# Patient Record
Sex: FEMALE | Race: WHITE | NOT HISPANIC OR LATINO | Employment: OTHER | URBAN - NONMETROPOLITAN AREA
[De-identification: names, ages, dates, MRNs, and addresses within clinical notes are randomized per-mention and may not be internally consistent; named-entity substitution may affect disease eponyms.]

---

## 2017-01-25 DIAGNOSIS — E78.5 HYPERLIPIDEMIA LDL GOAL <130: Primary | ICD-10-CM

## 2017-01-25 NOTE — TELEPHONE ENCOUNTER
Crestor     Last Written Prescription Date: 08/16/2016  Last Fill Quantity: 90, # refills: 1  [place] [facility] [facility] or [facility] prescribing provider: 08/23/2016       No results found for this basename: chol  No results found for this basename: hdl  No results found for this basename: ldl  No results found for this basename: trig  No results found for this basename: choljanki Villalobos, [facility]

## 2017-01-25 NOTE — LETTER
[address]   [address]  Tel. [phone]/ Fax [phone]    October 13, 2017        Lani BARKSDALE Jhonny  [address] [place] [address]          Dear Ms. Lani Barry:    Your previous orders for fasting lab work have been extended.  Please call to schedule a lab appointment and return to the clinic to have the labs drawn at your earliest convenience.    If you are required to be fasting for these tests,  remember to have nothing by mouth (except water) after midnight on the night before your test.    If you have any questions or concerns, please contact our office.    Sincerely,       Cipriano Arias D.O. [facility]

## 2017-01-27 RX ORDER — ROSUVASTATIN CALCIUM 40 MG/1
40 TABLET, COATED ORAL DAILY
Qty: 90 TABLET | Refills: 1 | Status: SHIPPED | OUTPATIENT
Start: 2017-01-27 | End: 2017-06-15

## 2017-01-27 NOTE — TELEPHONE ENCOUNTER
Crestor  Routing refill request to provider for review/approval because:  Labs not current:  No fasting lipids on file--future labs ordered, routing to schedulers    Rommel Dallas RN, BSN

## 2017-04-04 DIAGNOSIS — G56.22 CUBITAL TUNNEL SYNDROME, LEFT: ICD-10-CM

## 2017-04-04 NOTE — TELEPHONE ENCOUNTER
meloxicam (MOBIC) 15 MG tablet      Last Written Prescription Date: 10/6/16  Last Quantity: 30, # refills: 2  Last Office Visit with G, P or [facility] prescribing provider: 8/23/16       Creatinine   Date Value Ref Range Status   08/16/2016 0.64 0.52 - 1.04 mg/dL Final     Lab Results   Component Value Date    AST 38 08/23/2016     Lab Results   Component Value Date    ALT 70 08/23/2016     BP Readings from Last 3 Encounters:   11/05/16 117/63   08/30/16 107/55   08/23/16 122/76

## 2017-04-05 RX ORDER — MELOXICAM 15 MG/1
TABLET ORAL
Qty: 30 TABLET | Refills: 4 | Status: SHIPPED | OUTPATIENT
Start: 2017-04-05 | End: 2017-10-25

## 2017-04-05 NOTE — TELEPHONE ENCOUNTER
Prescription approved per [facility] Refill Protocol.    Georgia Briones RN, BSN

## 2017-04-07 ENCOUNTER — TELEPHONE (OUTPATIENT)
Dept: FAMILY MEDICINE | Facility: OTHER | Age: 62
End: 2017-04-07

## 2017-04-07 NOTE — LETTER
[address]   [address]  Tel. [phone] / Fax [phone]    April 7, 2017        Lani Barry  [address] [place] [address]          Dear Lani,    Our records show that you are due for a physical, pap, mammogram, and colonoscopy. Please contact us at [phone] to schedule. It is important to stay current with healthcare visits and lab work as directed by your physician.     If you have a high deductible or currently do not have health insurance please consider contacting the [facility] program. For eligible women, [facility] provides free office visits for breast and cervical exams, as well as a screening mammogram and Pap smears. If one of your screening tests shows a problem, [facility] covers many diagnostic services and can often cover treatment, if needed. You can complete the office visit at your [facility]. You can contact [facility] at [phone].    If you have completed these tests at another facility, please have the records sent to our clinic so that we can best coordinate your care. If you have been seen or you plan to be seen by another provider for these concerns or if you are unable follow through on the recommendations, please contact the clinic.    Taking care of your health is important to us!       Sincerely,        Cipriano Arias D.O.

## 2017-04-07 NOTE — TELEPHONE ENCOUNTER
Panel Management Review      Patient has the following on her problem list: None      Composite cancer screening  Chart review shows that this patient is due/due soon for the following Pap Smear, Mammogram and Colonoscopy  Summary:    Patient is due/failing the following:   COLONOSCOPY, MAMMOGRAM and PAP    Action needed:   Patient needs office visit for mammo, pap, colonoscopy.    Type of outreach:    Sent letter.    Questions for provider review:    None                                                                                                                                    Tracy RAIN     Chart routed to Care Team .

## 2017-04-18 ENCOUNTER — OFFICE VISIT (OUTPATIENT)
Dept: FAMILY MEDICINE | Facility: OTHER | Age: 62
End: 2017-04-18
Payer: MEDICARE

## 2017-04-18 VITALS
SYSTOLIC BLOOD PRESSURE: 122 MMHG | DIASTOLIC BLOOD PRESSURE: 72 MMHG | TEMPERATURE: 98 F | HEART RATE: 74 BPM | OXYGEN SATURATION: 98 % | WEIGHT: 203 LBS | BODY MASS INDEX: 38.99 KG/M2 | RESPIRATION RATE: 16 BRPM

## 2017-04-18 DIAGNOSIS — L20.84 INTRINSIC ECZEMA: ICD-10-CM

## 2017-04-18 DIAGNOSIS — Z12.31 ENCOUNTER FOR SCREENING MAMMOGRAM FOR BREAST CANCER: ICD-10-CM

## 2017-04-18 DIAGNOSIS — E78.5 HYPERLIPIDEMIA LDL GOAL <130: Primary | ICD-10-CM

## 2017-04-18 DIAGNOSIS — Z12.11 SPECIAL SCREENING FOR MALIGNANT NEOPLASMS, COLON: ICD-10-CM

## 2017-04-18 PROCEDURE — 99213 OFFICE O/P EST LOW 20 MIN: CPT | Performed by: INTERNAL MEDICINE

## 2017-04-18 RX ORDER — FLUOCINOLONE ACETONIDE 0.1 MG/ML
SOLUTION TOPICAL 2 TIMES DAILY
Qty: 60 ML | Refills: 1 | Status: SHIPPED | OUTPATIENT
Start: 2017-04-18 | End: 2017-05-18

## 2017-04-18 ASSESSMENT — PAIN SCALES - GENERAL: PAINLEVEL: NO PAIN (0)

## 2017-04-18 NOTE — LETTER

## 2017-04-18 NOTE — NURSING NOTE
"Chief Complaint   Patient presents with     Lipids     recheck       Initial /72  Pulse 74  Temp 98  F (36.7  C) (Tympanic)  Resp 16  Wt 203 lb (92.1 kg)  SpO2 98%  BMI 38.99 kg/m2 Estimated body mass index is 38.99 kg/(m^2) as calculated from the following:    Height as of 9/14/16: 5' 0.5\" (1.537 m).    Weight as of this encounter: 203 lb (92.1 kg).  Medication Reconciliation: complete    "

## 2017-04-18 NOTE — MR AVS SNAPSHOT
After Visit Summary   4/18/2017    Lani Barry    MRN: [id number]           Patient Information     Date Of Birth          1955        Visit Information        Provider Department      4/18/2017 9:40 AM Cipriano Arias DO [facility]        Today's Diagnoses     Hyperlipidemia LDL goal <130    -  1    Intrinsic eczema        Encounter for screening mammogram for breast cancer        Special screening for malignant neoplasms, colon           Follow-ups after your visit        Additional Services     GASTROENTEROLOGY ADULT REF PROCEDURE ONLY       Last Lab Result: Creatinine (mg/dL)       Date                     Value                 08/16/2016               0.64             ----------  Body mass index is 38.99 kg/(m^2).     Needed:  No  Language:  English    Patient will be contacted to schedule procedure.     Please be aware that coverage of these services is subject to the terms and limitations of your health insurance plan.  Call member services at your health plan with any benefit or coverage questions.  Any procedures must be performed at a [facility] facility OR coordinated by your clinic's referral office.    Please bring the following with you to your appointment:    (1) Any X-Rays, CTs or MRIs which have been performed.  Contact the facility where they were done to arrange for  prior to your scheduled appointment.    (2) List of current medications   (3) This referral request   (4) Any documents/labs given to you for this referral                  Your next 10 appointments already scheduled     Apr 25, 2017  9:15 AM CDT   MA SCREENING DIGITAL BILATERAL with [facility]MA1   [facility] ([facility]    [address] [facility] [id number] [phone]           Do not use any powder, lotion or deodorant under your arms or on your breast. If you do, we will ask you to remove it before your exam.  Wear comfortable, two-piece  "clothing.  If you have any allergies, tell your care team.  Bring any previous mammograms from other facilities or have them mailed to the breast center.              Future tests that were ordered for you today     Open Future Orders        Priority Expected Expires Ordered    *MA Screening Digital Bilateral Routine  2018            Who to contact     If you have questions or need follow up information about today's clinic visit or your schedule please contact [facility] directly at [phone].  Normal or non-critical lab and imaging results will be communicated to you by Mentegramhart, letter or phone within 4 business days after the clinic has received the results. If you do not hear from us within 7 days, please contact the clinic through amcuret or phone. If you have a critical or abnormal lab result, we will notify you by phone as soon as possible.  Submit refill requests through Nutshell or call your pharmacy and they will forward the refill request to us. Please allow 3 business days for your refill to be completed.          Additional Information About Your Visit        Nutshell Information     Nutshell lets you send messages to your doctor, view your test results, renew your prescriptions, schedule appointments and more. To sign up, go to [email or web address] . Click on \"Log in\" on the left side of the screen, which will take you to the Welcome page. Then click on \"Sign up Now\" on the right side of the page.     You will be asked to enter the access code listed below, as well as some personal information. Please follow the directions to create your username and password.     Your access code is: [email or web address]  Expires: 2017 10:24 AM     Your access code will  in 90 days. If you need help or a new code, please call your [facility] or [phone].        Care EveryWhere ID     This is your Care EveryWhere ID. This could be used by other organizations to access your " [facility] medical records  [id number]        Your Vitals Were     Pulse Temperature Respirations Pulse Oximetry BMI (Body Mass Index)       74 98  F (36.7  C) (Tympanic) 16 98% 38.99 kg/m2        Blood Pressure from Last 3 Encounters:   04/18/17 122/72   11/05/16 117/63   08/30/16 107/55    Weight from Last 3 Encounters:   04/18/17 203 lb (92.1 kg)   09/14/16 195 lb (88.5 kg)   08/30/16 195 lb (88.5 kg)              We Performed the Following     GASTROENTEROLOGY ADULT REF PROCEDURE ONLY          Today's Medication Changes          These changes are accurate as of: 4/18/17 10:26 AM.  If you have any questions, ask your nurse or doctor.               Start taking these medicines.        Dose/Directions    fluocinolone 0.01 % solution   Commonly known as:  SYNALAR   Used for:  Intrinsic eczema   Started by:  Cipriano Arias DO        Apply topically 2 times daily   Quantity:  60 mL   Refills:  1         These medicines have changed or have updated prescriptions.        Dose/Directions    nexIUM 40 MG CR capsule   This may have changed:  Another medication with the same name was removed. Continue taking this medication, and follow the directions you see here.   Used for:  Gastroesophageal reflux disease without esophagitis   Generic drug:  esomeprazole   Changed by:  Cipriano Arias DO        Dose:  40 mg   Take 1 capsule (40 mg) by mouth every morning (before breakfast) Take 30-60 minutes before eating.   Quantity:  90 capsule   Refills:  3            Where to get your medicines      These medications were sent to [facility] [address] [address]     Phone:  [phone]     fluocinolone 0.01 % solution                Primary Care Provider Office Phone # Fax #    Cipriano Arias [phone]       [address] [place] [address]        Thank you!     Thank you  for choosing [facility]  for your care. Our goal is always to provide you with excellent care. Hearing back from our patients is one way we can continue to improve our services. Please take a few minutes to complete the written survey that you may receive in the mail after your visit with us. Thank you!             Your Updated Medication List - Protect others around you: Learn how to safely use, store and throw away your medicines at www.disposemymeds.org.          This list is accurate as of: 4/18/17 10:26 AM.  Always use your most recent med list.                   Brand Name Dispense Instructions for use    aspirin 81 MG chewable tablet     108 tablet    Take 1 tablet (81 mg) by mouth daily       B-6 50 MG Tabs          fluocinolone 0.01 % solution    SYNALAR    60 mL    Apply topically 2 times daily       meloxicam 15 MG tablet    MOBIC    30 tablet    TAKE 1 TABLET DAILY       nexIUM 40 MG CR capsule   Generic drug:  esomeprazole     90 capsule    Take 1 capsule (40 mg) by mouth every morning (before breakfast) Take 30-60 minutes before eating.       rosuvastatin 40 MG tablet    CRESTOR    90 tablet    Take 1 tablet (40 mg) by mouth daily

## 2017-04-18 NOTE — PROGRESS NOTES
SUBJECTIVE:                                                    Lani Barry is a 62 year old female who presents to clinic today for the following health issues:      Hyperlipidemia Follow-Up      Rate your low fat/cholesterol diet?: good    Taking statin?  Crestor    Other lipid medications/supplements?:  none       Amount of exercise or physical activity: None    Problems taking medications regularly: No    Medication side effects: none    Diet: regular (no restrictions)    CHIEF COMPLAINT:    The patient presents today for follow-up of her hyperlipidemia and Crestor. Unfortunately, she's not actually taking the Crestor. She states that she may remember one pill a week but usually less. Taking it in the evening is not working for her. She's had chest pain or other symptoms of acute cardiovascular involvement. She had no muscle pain when she did take medication but never really took it regularly enough to determine that anyway. She'll take her Nexium for reflux that seems to be working quite well. She does complain of a rash inside her ears. Is in both ears but worse on the left. She has very typical eczema. She also has a small patch on the back of her scalp at the hairline. It is red, slightly crusty and flaky. No signs of secondary infection are noted. We've also discussed the need for screening colonoscopy and mammography. She is post total hysterectomy therefore does not require Pap smear.                       PAST, FAMILY,SOCIAL HISTORY:     Medical  History:   has no past medical history on file.     Surgical History:   has a past surgical history that includes Release ulnar nerve (elbow) (Left, 8/30/2016).     Social History:   reports that she has never smoked. She has never used smokeless tobacco. She reports that she does not drink alcohol or use illicit drugs.     Family History:  family history includes CANCER in her sister; CEREBROVASCULAR DISEASE in her sister; DIABETES in her paternal  uncle.            MEDICATIONS  Current Outpatient Prescriptions   Medication Sig Dispense Refill     fluocinolone (SYNALAR) 0.01 % solution Apply topically 2 times daily 60 mL 1     meloxicam (MOBIC) 15 MG tablet TAKE 1 TABLET DAILY 30 tablet 4     rosuvastatin (CRESTOR) 40 MG tablet Take 1 tablet (40 mg) by mouth daily 90 tablet 1     NEXIUM 40 MG capsule Take 1 capsule (40 mg) by mouth every morning (before breakfast) Take 30-60 minutes before eating. 90 capsule 3     Pyridoxine HCl (B-6) 50 MG TABS        aspirin 81 MG chewable tablet Take 1 tablet (81 mg) by mouth daily 108 tablet 3         --------------------------------------------------------------------------------------------------------------------                          REVIEW OF SYSTEMS:         LUNGS: Pt denies: cough,excess sputum, hemoptysis, or shortness of breath.   HEART: Pt denies: chest pain, arrythmia, syncope, tachy or bradyarrhythmia or excess edema.   GI: Pt denies: nausea, vomitting, diarrhea, constipation, melena, or hematochezia.   NEURO: Pt denies: seizures, strokes, diplopia, weakness, paraesthesias, or paralysis.   SKIN: Pt denies: discoloration, or additional lesions of concern. Denies recent hair loss. Does have eczema in her external auditory ears/pinna and posterior scalp.                          EXAMINATION:         /72  Pulse 74  Temp 98  F (36.7  C) (Tympanic)  Resp 16  Wt 203 lb (92.1 kg)  SpO2 98%  BMI 38.99 kg/m2   Constitutional: The patient appears to be in no acute distress. The patient appears to be adequately hydrated. No acute respiratory or hemodynamic distress is noted at this time.   LUNGS: clear bilaterally, airflow is brisk, no intercostal retraction or stridor is noted. No coughing is noted during visit.   HEART:  regular without rubs, clicks, gallops, or murmurs. PMI is nondisplaced. Upstrokes are brisk. S1,S2 are heard.   GI: Abdomen is soft, without rebound, guarding or tenderness. Bowel sounds  are appropriate. No renal bruits are heard.    NEURO: Pt is alert and appropriate. No neurologic lateralization is noted. Cranial nerves 2-12 are intact. Peripheral sensory and motor function are grossly normal   SKIN:  warm and dry. The eczema is as described above. No other lesions of concern are noted.                          DECISION MAKIN. Hyperlipidemia LDL goal <130  Recommend that she takes the statin medication and we rechecked the blood work in about a month. There is no real reason to check it now as the elevated cholesterol would only be followed by her recommendation to take the medication.    2. Intrinsic eczema  Trial of topical steroid. Instructed not to use this anywhere except her current areas of eczema.  - fluocinolone (SYNALAR) 0.01 % solution; Apply topically 2 times daily  Dispense: 60 mL; Refill: 1    3. Encounter for screening mammogram for breast cancer  Discussed and scheduled  - *MA Screening Digital Bilateral; Future    4. Special screening for malignant neoplasms, colon  Discussed and scheduled  - GASTROENTEROLOGY ADULT REF PROCEDURE ONLY                                 FOLLOW UP    I have asked the patient to make an appointment for follow up with me in 1 month fasting          I have carefully explained the diagnosis and treatment options with the patient. The patient has displayed an understanding of the above, and all subsequent questions were answered.         DO ADOLFO Barth    Portions of this note were produced using Strands  Although every attempt at real-time proof reading has been made, occasional grammar/syntax errors may have been missed.

## 2017-04-25 ENCOUNTER — RADIANT APPOINTMENT (OUTPATIENT)
Dept: MAMMOGRAPHY | Facility: OTHER | Age: 62
End: 2017-04-25
Attending: INTERNAL MEDICINE
Payer: MEDICARE

## 2017-04-25 DIAGNOSIS — Z12.31 ENCOUNTER FOR SCREENING MAMMOGRAM FOR BREAST CANCER: ICD-10-CM

## 2017-04-25 PROCEDURE — G0202 SCR MAMMO BI INCL CAD: HCPCS | Mod: TC

## 2017-05-10 ENCOUNTER — HOSPITAL ENCOUNTER (OUTPATIENT)
Facility: CLINIC | Age: 62
Discharge: HOME | End: 2017-05-10
Attending: INTERNAL MEDICINE | Admitting: INTERNAL MEDICINE
Payer: MEDICARE

## 2017-05-10 ENCOUNTER — SURGERY (OUTPATIENT)
Age: 62
End: 2017-05-10

## 2017-05-10 VITALS
TEMPERATURE: 98.3 F | SYSTOLIC BLOOD PRESSURE: 93 MMHG | OXYGEN SATURATION: 98 % | DIASTOLIC BLOOD PRESSURE: 49 MMHG | RESPIRATION RATE: 16 BRPM

## 2017-05-10 LAB — COLONOSCOPY: NORMAL

## 2017-05-10 PROCEDURE — 25000128 H RX IP 250 OP 636: Performed by: INTERNAL MEDICINE

## 2017-05-10 PROCEDURE — 45380 COLONOSCOPY AND BIOPSY: CPT | Mod: XU,PT

## 2017-05-10 PROCEDURE — 25000125 ZZHC RX 250: Performed by: INTERNAL MEDICINE

## 2017-05-10 PROCEDURE — 88305 TISSUE EXAM BY PATHOLOGIST: CPT | Mod: 26 | Performed by: INTERNAL MEDICINE

## 2017-05-10 PROCEDURE — 40000296 ZZH STATISTIC ENDO RECOVERY CLASS 1:2 FIRST HOUR: Performed by: INTERNAL MEDICINE

## 2017-05-10 PROCEDURE — 88305 TISSUE EXAM BY PATHOLOGIST: CPT | Performed by: INTERNAL MEDICINE

## 2017-05-10 PROCEDURE — 45385 COLONOSCOPY W/LESION REMOVAL: CPT | Mod: PT | Performed by: INTERNAL MEDICINE

## 2017-05-10 RX ORDER — FENTANYL CITRATE 50 UG/ML
INJECTION, SOLUTION INTRAMUSCULAR; INTRAVENOUS PRN
Status: DISCONTINUED | OUTPATIENT
Start: 2017-05-10 | End: 2017-05-10 | Stop reason: HOSPADM

## 2017-05-10 RX ORDER — ONDANSETRON 2 MG/ML
4 INJECTION INTRAMUSCULAR; INTRAVENOUS
Status: DISCONTINUED | OUTPATIENT
Start: 2017-05-10 | End: 2017-05-10 | Stop reason: HOSPADM

## 2017-05-10 RX ORDER — LIDOCAINE 40 MG/G
CREAM TOPICAL
Status: DISCONTINUED | OUTPATIENT
Start: 2017-05-10 | End: 2017-05-10 | Stop reason: HOSPADM

## 2017-05-10 RX ADMIN — MIDAZOLAM HYDROCHLORIDE 1 MG: 1 INJECTION, SOLUTION INTRAMUSCULAR; INTRAVENOUS at 13:43

## 2017-05-10 RX ADMIN — MIDAZOLAM HYDROCHLORIDE 2 MG: 1 INJECTION, SOLUTION INTRAMUSCULAR; INTRAVENOUS at 13:37

## 2017-05-10 RX ADMIN — FENTANYL CITRATE 50 MCG: 50 INJECTION, SOLUTION INTRAMUSCULAR; INTRAVENOUS at 13:44

## 2017-05-10 RX ADMIN — MIDAZOLAM HYDROCHLORIDE 2 MG: 1 INJECTION, SOLUTION INTRAMUSCULAR; INTRAVENOUS at 13:48

## 2017-05-10 RX ADMIN — MIDAZOLAM HYDROCHLORIDE 1 MG: 1 INJECTION, SOLUTION INTRAMUSCULAR; INTRAVENOUS at 13:40

## 2017-05-10 RX ADMIN — FENTANYL CITRATE 50 MCG: 50 INJECTION, SOLUTION INTRAMUSCULAR; INTRAVENOUS at 13:37

## 2017-05-10 RX ADMIN — MIDAZOLAM HYDROCHLORIDE 1 MG: 1 INJECTION, SOLUTION INTRAMUSCULAR; INTRAVENOUS at 13:39

## 2017-05-10 NOTE — IP AVS SNAPSHOT
[facility]    [address] [facility] [address]    Phone:  [phone]                                       After Visit Summary   5/10/2017    Lani Barry    MRN: [id number]           After Visit Summary Signature Page     I have received my discharge instructions, and my questions have been answered. I have discussed any challenges I see with this plan with the nurse or doctor.    ..........................................................................................................................................  Patient/Patient Representative Signature      ..........................................................................................................................................  Patient Representative Print Name and Relationship to Patient    ..................................................               ................................................  Date                                            Time    ..........................................................................................................................................  Reviewed by Signature/Title    ...................................................              ..............................................  Date                                                            Time

## 2017-05-10 NOTE — IP AVS SNAPSHOT
"                  MRN:[id number]                      After Visit Summary   5/10/2017    Lani aBrry    MRN: [id number]           Thank you!     Thank you for choosing [facility] for your care. Our goal is always to provide you with excellent care. Hearing back from our patients is one way we can continue to improve our services. Please take a few minutes to complete the written survey that you may receive in the mail after you visit with us. Thank you!        Patient Information     Date Of Birth          1955        About your hospital stay     You were admitted on:  May 10, 2017 You last received care in the:  [facility]    You were discharged on:  May 10, 2017       Who to Call     For medical emergencies, please call 911.  For non-urgent questions about your medical care, please call your primary care provider or clinic, [phone]  For questions related to your surgery, please call your surgery clinic        Attending Provider     Provider Specialty    Luis Dumont MD Gastroenterology       Primary Care Provider Office Phone # Fax #    Rfdizdtr Miguel Arias [phone]       [address]   [facility] [address]        Pending Results     Date and Time Order Name Status Description    5/10/2017 1351 Surgical pathology exam In process             Admission Information     Date & Time Provider Department Dept. Phone    5/10/2017 Luis Dumont MD [facility] [phone]      Your Vitals Were     Blood Pressure Temperature Respirations Pulse Oximetry          103/52 98.3  F (36.8  C) (Oral) 16 99%        MyChart Information     Nuritast lets you send messages to your doctor, view your test results, renew your prescriptions, schedule appointments and more. To sign up, go to [email or web address] . Click on \"Log in\" on the left side of the screen, which will take you to the Welcome page. Then click on \"Sign up Now\" on " the right side of the page.     You will be asked to enter the access code listed below, as well as some personal information. Please follow the directions to create your username and password.     Your access code is: [email or web address]  Expires: 2017 10:24 AM     Your access code will  in 90 days. If you need help or a new code, please call your [facility] clinic or [phone].        Care EveryWhere ID     This is your Care EveryWhere ID. This could be used by other organizations to access your [facility] medical records  [id number]           Review of your medicines      CONTINUE these medicines which have NOT CHANGED        Dose / Directions    aspirin 81 MG chewable tablet        Dose:  81 mg   Take 1 tablet (81 mg) by mouth daily   Quantity:  108 tablet   Refills:  3       B-6 50 MG Tabs        Refills:  0       fluocinolone 0.01 % solution   Commonly known as:  SYNALAR   Used for:  Intrinsic eczema        Apply topically 2 times daily   Quantity:  60 mL   Refills:  1       meloxicam 15 MG tablet   Commonly known as:  MOBIC   Used for:  Cubital tunnel syndrome, left        TAKE 1 TABLET DAILY   Quantity:  30 tablet   Refills:  4       nexIUM 40 MG CR capsule   Used for:  Gastroesophageal reflux disease without esophagitis   Generic drug:  esomeprazole        Dose:  40 mg   Take 1 capsule (40 mg) by mouth every morning (before breakfast) Take 30-60 minutes before eating.   Quantity:  90 capsule   Refills:  3       rosuvastatin 40 MG tablet   Commonly known as:  CRESTOR   Used for:  Hyperlipidemia LDL goal <130        Dose:  40 mg   Take 1 tablet (40 mg) by mouth daily   Quantity:  90 tablet   Refills:  1                Protect others around you: Learn how to safely use, store and throw away your medicines at www.disposemymeds.org.             Medication List: This is a list of all your medications and when to take them. Check marks below indicate your daily home schedule. Keep this list as a reference.       Medications           Morning Afternoon Evening Bedtime As Needed    aspirin 81 MG chewable tablet   Take 1 tablet (81 mg) by mouth daily                                B-6 50 MG Tabs                                fluocinolone 0.01 % solution   Commonly known as:  SYNALAR   Apply topically 2 times daily                                meloxicam 15 MG tablet   Commonly known as:  MOBIC   TAKE 1 TABLET DAILY                                nexIUM 40 MG CR capsule   Take 1 capsule (40 mg) by mouth every morning (before breakfast) Take 30-60 minutes before eating.   Generic drug:  esomeprazole                                rosuvastatin 40 MG tablet   Commonly known as:  CRESTOR   Take 1 tablet (40 mg) by mouth daily

## 2017-05-10 NOTE — CONSULTS
[facility] GI Pre-Procedure Physical Assessment    Lani Barry MRN# [id number]   Age: 62 year old YOB: 1955      Date of Surgery: 5/10/2017  Location [facility]      Date of Exam 5/10/2017 Facility (Same day)       Home clinic: [facility]  Primary care provider: Cipriano Arias         Active problem list:   Patient Active Problem List   Diagnosis     Lesion of left ulnar nerve     Gastroesophageal reflux disease without esophagitis     Hyperlipidemia LDL goal <130     Cubital tunnel syndrome, left            Medications (include herbals and vitamins):   Any Plavix use in the last 7 days?  No     Current Facility-Administered Medications   Medication     lidocaine 1 % 1 mL     lidocaine (LMX4) kit     sodium chloride (PF) 0.9% PF flush 3 mL     sodium chloride (PF) 0.9% PF flush 3 mL     sodium chloride (PF) 0.9% PF flush 3 mL     ondansetron (ZOFRAN) injection 4 mg             Allergies:    No Known Allergies  Allergy to Latex?  No  Allergy to tape?    No          Social History:     Social History   Substance Use Topics     Smoking status: Current Some Day Smoker     Types: Cigars     Smokeless tobacco: Never Used     Alcohol use No            Physical Exam:   All vitals have been reviewed  Blood pressure 126/65, temperature 98.3  F (36.8  C), temperature source Oral, resp. rate 16, SpO2 98 %.  Airway assessment:   Patient is able to open mouth wide  Patient is able to stick out tongue      Lungs:   No increased work of breathing, good air exchange, clear to auscultation bilaterally, no crackles or wheezing      Cardiovascular:   Normal apical impulse, regular rate and rhythm, normal S1 and S2, no S3 or S4, and no murmur noted           Lab / Radiology Results:   All laboratory data reviewed          Assessment:   Appropriately NPO  Chief complaint or anatomic assessment of involved area: screen         Plan:   Moderate (conscious) sedation      Patient's active problems diagnostically and therapeutically optimized for the planned procedure  Risks, benefits, alternatives to sedation and blood explained and consent obtained  Risks, benefits, alternatives to procedure explained and consent obtained  Orders and progress notes are in the chart  Discharge from Phase 1 and / or Phase 2 recovery when patient meets criteria    I have reviewed the history and physical, lab finding(s), diagnostic data, medicaitons, and the plan for sedation.  I have determined this patient to be an appropriate candidate for the planned sedation / procedure and have reassessed the patient immediately prior to sedation / procedure.    I have personally and medically directed the administration of medications used.    Luis Dumont MD

## 2017-05-12 LAB — COPATH REPORT: NORMAL

## 2017-06-08 ENCOUNTER — OFFICE VISIT (OUTPATIENT)
Dept: NEUROSURGERY | Facility: CLINIC | Age: 62
End: 2017-06-08
Payer: MEDICARE

## 2017-06-08 VITALS — BODY MASS INDEX: 37.76 KG/M2 | HEIGHT: 61 IN | WEIGHT: 200 LBS | TEMPERATURE: 96.7 F

## 2017-06-08 DIAGNOSIS — M54.16 LUMBAR RADICULOPATHY: Primary | ICD-10-CM

## 2017-06-08 PROCEDURE — 99204 OFFICE O/P NEW MOD 45 MIN: CPT | Performed by: NEUROLOGICAL SURGERY

## 2017-06-08 ASSESSMENT — PAIN SCALES - GENERAL: PAINLEVEL: SEVERE PAIN (6)

## 2017-06-08 NOTE — PROGRESS NOTES
"Lani Barry is a 62 year old female who presents for:  Chief Complaint   Patient presents with     Neurologic Problem     Low back pain        Initial Vitals:  Temp 96.7  F (35.9  C) (Temporal)  Ht 1.537 m (5' 0.5\")  Wt 90.7 kg (200 lb)  BMI 38.42 kg/m2 Estimated body mass index is 38.42 kg/(m^2) as calculated from the following:    Height as of this encounter: 1.537 m (5' 0.5\").    Weight as of this encounter: 90.7 kg (200 lb).. Body surface area is 1.97 meters squared. BP completed using cuff size: NA (Not Taken)  Severe Pain (6)    Do you feel safe in your environment?  Yes  Do you need any refills today? No    Nursing Comments:         Aliyah Suggs CMA ([facility])      "

## 2017-06-08 NOTE — PATIENT INSTRUCTIONS
Lumbar CT and MRI. We will call you with results.     Once we receive your past medical records, we will review and contact you to order injection.     Please call with questions: [phone]

## 2017-06-08 NOTE — MR AVS SNAPSHOT
"              After Visit Summary   6/8/2017    Lani Barry    MRN: [id number]           Patient Information     Date Of Birth          1955        Visit Information        Provider Department      6/8/2017 1:40 PM Doug Ashford MD [facility]        Today's Diagnoses     Lumbar radiculopathy    -  1      Care Instructions    Lumbar CT and MRI. We will call you with results.     Once we receive your past medical records, we will review and contact you to order injection.     Please call with questions: [phone]          Follow-ups after your visit        Future tests that were ordered for you today     Open Future Orders        Priority Expected Expires Ordered    CT Lumbar Spine w/o Contrast Routine  6/8/2018 6/8/2017    MR Lumbar Spine w/o Contrast Routine  6/8/2018 6/8/2017            Who to contact     If you have questions or need follow up information about today's clinic visit or your schedule please contact [facility] directly at [phone].  Normal or non-critical lab and imaging results will be communicated to you by Qumuhart, letter or phone within 4 business days after the clinic has received the results. If you do not hear from us within 7 days, please contact the clinic through Insurityt or phone. If you have a critical or abnormal lab result, we will notify you by phone as soon as possible.  Submit refill requests through ulike or call your pharmacy and they will forward the refill request to us. Please allow 3 business days for your refill to be completed.          Additional Information About Your Visit        Qumuhart Information     ulike lets you send messages to your doctor, view your test results, renew your prescriptions, schedule appointments and more. To sign up, go to [email or web address] . Click on \"Log in\" on the left side of the screen, which will take you to the Welcome page. Then click on \"Sign up Now\" on the right side of the page. " "    You will be asked to enter the access code listed below, as well as some personal information. Please follow the directions to create your username and password.     Your access code is: [email or web address]  Expires: 2017 10:24 AM     Your access code will  in 90 days. If you need help or a new code, please call your [facility] or [phone].        Care EveryWhere ID     This is your Care EveryWhere ID. This could be used by other organizations to access your [place] medical records  [id number]        Your Vitals Were     Temperature Height BMI (Body Mass Index)             96.7  F (35.9  C) (Temporal) 5' 0.5\" (1.537 m) 38.42 kg/m2          Blood Pressure from Last 3 Encounters:   05/10/17 93/49   17 122/72   16 117/63    Weight from Last 3 Encounters:   17 200 lb (90.7 kg)   17 203 lb (92.1 kg)   16 195 lb (88.5 kg)               Primary Care Provider Office Phone # Fax #    Cipriano Miguel Arias,  [phone]       [address]   [facility] [address]        Thank you!     Thank you for choosing [facility]  for your care. Our goal is always to provide you with excellent care. Hearing back from our patients is one way we can continue to improve our services. Please take a few minutes to complete the written survey that you may receive in the mail after your visit with us. Thank you!             Your Updated Medication List - Protect others around you: Learn how to safely use, store and throw away your medicines at www.disposemymeds.org.          This list is accurate as of: 17  2:31 PM.  Always use your most recent med list.                   Brand Name Dispense Instructions for use    aspirin 81 MG chewable tablet     108 tablet    Take 1 tablet (81 mg) by mouth daily       B-6 50 MG Tabs          fluocinolone 0.01 % solution    SYNALAR    60 mL    APPLY TOPICALLY TWICE A DAY       meloxicam 15 MG tablet    " MOBIC    30 tablet    TAKE 1 TABLET DAILY       nexIUM 40 MG CR capsule   Generic drug:  esomeprazole     90 capsule    Take 1 capsule (40 mg) by mouth every morning (before breakfast) Take 30-60 minutes before eating.       rosuvastatin 40 MG tablet    CRESTOR    90 tablet    Take 1 tablet (40 mg) by mouth daily

## 2017-06-09 ENCOUNTER — TELEPHONE (OUTPATIENT)
Dept: NEUROSURGERY | Facility: CLINIC | Age: 62
End: 2017-06-09

## 2017-06-09 ENCOUNTER — HOSPITAL ENCOUNTER (OUTPATIENT)
Dept: MRI IMAGING | Facility: CLINIC | Age: 62
Discharge: HOME | End: 2017-06-09
Attending: NEUROLOGICAL SURGERY | Admitting: NEUROLOGICAL SURGERY
Payer: MEDICARE

## 2017-06-09 ENCOUNTER — HOSPITAL ENCOUNTER (OUTPATIENT)
Dept: CT IMAGING | Facility: CLINIC | Age: 62
End: 2017-06-09
Attending: NEUROLOGICAL SURGERY
Payer: MEDICARE

## 2017-06-09 DIAGNOSIS — M54.16 LUMBAR RADICULOPATHY: ICD-10-CM

## 2017-06-09 PROCEDURE — A9585 GADOBUTROL INJECTION: HCPCS | Performed by: RADIOLOGY

## 2017-06-09 PROCEDURE — 72131 CT LUMBAR SPINE W/O DYE: CPT

## 2017-06-09 PROCEDURE — 72158 MRI LUMBAR SPINE W/O & W/DYE: CPT

## 2017-06-09 PROCEDURE — 25000128 H RX IP 250 OP 636: Performed by: RADIOLOGY

## 2017-06-09 RX ORDER — GADOBUTROL 604.72 MG/ML
10 INJECTION INTRAVENOUS ONCE
Status: COMPLETED | OUTPATIENT
Start: 2017-06-09 | End: 2017-06-09

## 2017-06-09 RX ADMIN — GADOBUTROL 10 ML: 604.72 INJECTION INTRAVENOUS at 09:04

## 2017-06-09 NOTE — PROGRESS NOTES
"62F w/ hx A/P lumbar fusion, presents with back and leg pain.  Underwent A/P lumbar fusion in 2014 in [place], has undergone lumbar MBB every 6 months since then with a pain doctor in [place].  Over the last year, progressive left low back pain, and aching pain radiating to the posterior thigh and calf.  No weakness or sensory changes.         Past Medical History:   Diagnosis Date     Arthritis      Cerebral infarction (H)      Past Surgical History:   Procedure Laterality Date     RELEASE ULNAR NERVE (ELBOW) Left 8/30/2016    Procedure: RELEASE ULNAR NERVE (ELBOW);  Surgeon: Steve Parks DO;  Location:  OR     Social History     Social History     Marital status:      Spouse name: N/A     Number of children: 3     Years of education: N/A     Occupational History     Not on file.     Social History Main Topics     Smoking status: Current Some Day Smoker     Types: Cigars     Smokeless tobacco: Never Used     Alcohol use No     Drug use: No     Sexual activity: Yes     Partners: Male     Other Topics Concern     Not on file     Social History Narrative     Family History   Problem Relation Age of Onset     CANCER Sister      CEREBROVASCULAR DISEASE Sister      DIABETES Paternal Uncle         ROS: 10 point ROS neg other than the symptoms noted above in the HPI.    Physical Exam  Temp 96.7  F (35.9  C) (Temporal)  Ht 1.537 m (5' 0.5\")  Wt 90.7 kg (200 lb)  BMI 38.42 kg/m2  HEENT:  Normocephalic, atraumatic.  PERRLA.  EOM s intact.  Visual fields full to gross exam  Neck:  Supple, non-tender, without lymphadenopathy.  Heart:  No peripheral edema  Lungs:  No SOB  Abdomen:  Non-distended.   Skin:  Warm and dry.  Extremities:  No edema, cyanosis or clubbing.    NEUROLOGICAL EXAMINATION:     Mental status:  Alert and Oriented x 3, speech is fluent.  Cranial nerves:  II-XII intact.   Motor:    Shoulder Abduction:  Right:  5/5   Left:  5/5  Biceps:                      Right:  5/5   Left:  5/5  Triceps:        "              Right:  5/5   Left:  5/5  Wrist Extensors:       Right:  5/5   Left:  5/5  Wrist Flexors:           Right:  5/5   Left:  5/5  interosseus :            Right:  5/5   Left:  5/5   Hip Flexor:                Right: 5/5  Left:  5/5  Hip Adductor:             Right:  5/5  Left:  5/5  Hip Abductor:             Right:  5/5  Left:  5/5  Gastroc Soleus:        Right:  5/5  Left:  5/5  Tib/Ant:                      Right:  5/5  Left:  5/5  EHL:                     Right:  5/5  Left:  5/5  Sensation:  Intact  Reflexes:  Negative Babinski.  Negative Clonus.  Negative James's.  Coordination:  Smooth finger to nose testing.   Negative pronator drift.  Smooth tandem walking.    A/P:  62F w/ hx A/P lumbar fusion, presents with back and leg pain    I had a lengthy discussion with the patient, reviewing the history, symptoms and imaging  Will obtain MRI and CT of lumbar spine to assess prior  Surgery  Will also obtain outside records regarding surgery and prior injections  Will update patient after above performed regarding status of prior surgery  Will also re-order patient to obtain her prior injection with [place] pain clinic

## 2017-06-09 NOTE — TELEPHONE ENCOUNTER
Per Dr. Ashford, contacted patient to review CT results. We will plan for injection once we receive past records from outside of [place]. BLAINE was completed on 6/8/17. We will then order through [place] pain clinic. LVM with patient, advised her to call back with questions.

## 2017-06-12 ENCOUNTER — DOCUMENTATION ONLY (OUTPATIENT)
Dept: NEUROSURGERY | Facility: CLINIC | Age: 62
End: 2017-06-12

## 2017-06-12 NOTE — PROGRESS NOTES
Recieved the records from [facility] in [facility], will send the 107 pages to [facility] to be put into pt's chart.    Aliyah Suggs CMA ([facility])

## 2017-06-13 ENCOUNTER — TELEPHONE (OUTPATIENT)
Dept: NEUROSURGERY | Facility: CLINIC | Age: 62
End: 2017-06-13

## 2017-06-13 ENCOUNTER — TELEPHONE (OUTPATIENT)
Dept: PALLIATIVE MEDICINE | Facility: CLINIC | Age: 62
End: 2017-06-13

## 2017-06-13 DIAGNOSIS — M54.16 LUMBAR RADICULOPATHY: Primary | ICD-10-CM

## 2017-06-13 NOTE — TELEPHONE ENCOUNTER
Pre-screening Questions for Radiology Injections:    Injection to be done at which interventional clinic site? [facility]    Procedure ordered by Mkaeda    Procedure ordered? Lumbar Facet Joint Injection    What insurance would patient like us to bill for this procedure? Medicare      Worker's comp-Any injection DO NOT SCHEDULE and route to Mariluz Mark.      [employer] insurance - For SI joint injections, DO NOT SCHEDULE and route Mariluz Mark.      [employer]- MBB's must be scheduled at LEAST two weeks apart      [employer] - Any injection besides hip/shoulder/knee joint DO NOT SCHEDULE and route to Mariluz Mark. She will obtain PA and call pt back to schedule procedure or notify pt of denial.     HP C[employer]-PA REQUIRED FOR NON-ASHKAN OR Joint injections    Any chance of pregnancy? NO   If YES, do NOT schedule and route to RN pool    Is an  needed? No     Patient has a drive home? (mandatory) YES:     Is patient taking any blood thinners (plavix, coumadin, jantoven, warfarin, heparin, pradaxa or dabigatran )? No   If hold needed, do NOT schedule, route to RN pool     Is patient taking any aspirin products? Yes - Pt takes 81mg daily; instructed to hold 0 day(s) prior to procedure.      If more than 325mg/day do NOT schedule; route to RN pool     For CERVICAL procedures, hold all aspirin products for 6 days.      Does the patient have a bleeding or clotting disorder? No     If yes, okay to schedule AND route to RN nurse pool    **For any patients with platelet count <100, must be forwarded to provider**    Is patient diabetic?  No  If YES, have them bring their glucometer.    Does patient have an active infection or treated for one within the past week? No     Is patient currently taking any antibiotics?  No     For patients on chronic, preventative, or prophylactic antibiotics, procedures may be scheduled.     For patients on antibiotics for active or recent infection:    Jono Brady, Christelle Riddle,  [place]-antibiotic course must have been completed for 4 days    Jono Omer-antibiotic course must have been completed for 7 days    Is patient currently taking any steroid medications? (i.e. Prednisone, Medrol)  No     For patients on steroid medications:    Janessa Muller [place], [place]-steroid course must have been completed for 4 days    Jono Omer-steroid course must have been completed for 7 days    Reviewed with patient:  If you are started on any steroids or antibiotics between now and your appointment, you must contact us because it may affect our ability to perform your procedure.  Yes    Is patient actively being treated for cancer or immunocompromised, including the spleen having been removed? No    If YES, do NOT schedule and route to RN pool     **For Dr. Guardado patients without spleens should have the chart sent to her**    Are you able to get on and off an exam table with minimal or no assistance? Yes  If NO, do NOT schedule and route to RN pool    Are you able to roll over and lay on your stomach with minimal or no assistance? Yes  If NO, do NOT schedule and route to RN pool     Any allergies to contrast dye, iodine, shellfish, or numbing and steroid medications? No  If YES, route to RN pool AND add allergy information to appointment notes    Allergies: Review of patient's allergies indicates no known allergies.        Has the patient had a flu shot or any other vaccinations within 7 days before or after the procedure.  No       Does patient have an MRI/CT?  YES: MRI  (SI joint, hip injections, lumbar sympathetic blocks, and stellate ganglion blocks do not require an MRI)    Was the MRI done w/in the last 3 years?  Yes    Was MRI done at [facility]? Yes      If not, where was it done? N/A       If MRI was not done at [facility], [facility] or [facility] do NOT schedule and route to nursing.  If pt has an imaging disc, the injection may be scheduled but pt has to bring disc to  appt. If they show up w/out disc the injection cannot be done    Reminders (please tell patient if applicable):       Instructed pt to arrive 30 minutes early for IV start if this is for a cervical procedure, ALL sympathetic (stellate ganglion, hypogastric, or lumbar sympathetic block) and all sedation procedures (RFA, spinal cord stimulation trials).  Not Applicable    -IVs are not routinely placed for Riddle and [place] cervical case       If NPO for sedation, informed patient that it is okay to take medications with sips of water (except if they are to hold blood thinners).  Not Applicable   *DO take blood pressure medication if it is prescribed*      If this is for a cervical ASHKAN, informed patient that aspirin needs to be held for 6 days.   Not Applicable      For all patients not having spinal cord stimulator (SCS) trials or radiofrequency ablations (RFAs), informed patient:  IV sedation is not provided for this procedure.  If you feel that an oral anti-anxiety medication is needed, you can discuss this further with your referring provider or primary care provider.  The Pain Clinic provider will discuss specifics of what the procedure includes at your appointment.  Most procedures last 10-20 minutes.  We use numbing medications to help with any discomfort during the procedure.  Not Applicable      Do not schedule procedures requiring IV placement in the first appointment of the day or first appointment after lunch.       For patients 85 or older we recommend having an adult stay w/ them for the remainder of the day.       Does the patient have any questions?  NO    Patient scheduled [place] July 10th 9am  Nisha Knapp  [facility]

## 2017-06-13 NOTE — TELEPHONE ENCOUNTER
Received records from patient's past injection in [place]. Reviewed records with Dr. Ashford, and he would like to proceed with ordering lumbar facet injection through [facility]. Contacted patient and she's also in agreement with plan. Provided her with their number for scheduling.

## 2017-06-14 ENCOUNTER — OFFICE VISIT (OUTPATIENT)
Dept: FAMILY MEDICINE | Facility: OTHER | Age: 62
End: 2017-06-14
Payer: MEDICARE

## 2017-06-14 VITALS
DIASTOLIC BLOOD PRESSURE: 82 MMHG | WEIGHT: 198.8 LBS | OXYGEN SATURATION: 98 % | BODY MASS INDEX: 38.19 KG/M2 | SYSTOLIC BLOOD PRESSURE: 118 MMHG | HEART RATE: 66 BPM | TEMPERATURE: 98 F | RESPIRATION RATE: 18 BRPM

## 2017-06-14 DIAGNOSIS — Z72.0 TOBACCO ABUSE: ICD-10-CM

## 2017-06-14 DIAGNOSIS — E66.01 MORBID OBESITY DUE TO EXCESS CALORIES (H): ICD-10-CM

## 2017-06-14 DIAGNOSIS — E56.9 VITAMIN DEFICIENCY: ICD-10-CM

## 2017-06-14 DIAGNOSIS — K21.9 GASTROESOPHAGEAL REFLUX DISEASE WITHOUT ESOPHAGITIS: Primary | ICD-10-CM

## 2017-06-14 PROCEDURE — 99213 OFFICE O/P EST LOW 20 MIN: CPT | Performed by: INTERNAL MEDICINE

## 2017-06-14 RX ORDER — PYRIDOXINE HCL (VITAMIN B6) 50 MG
2 TABLET ORAL DAILY
Qty: 90 TABLET | Refills: 1 | Status: SHIPPED | OUTPATIENT
Start: 2017-06-14 | End: 2017-10-25

## 2017-06-14 RX ORDER — PANTOPRAZOLE SODIUM 40 MG/1
40 TABLET, DELAYED RELEASE ORAL DAILY
Qty: 90 TABLET | Refills: 1 | Status: SHIPPED | OUTPATIENT
Start: 2017-06-14 | End: 2017-10-25

## 2017-06-14 ASSESSMENT — PAIN SCALES - GENERAL: PAINLEVEL: NO PAIN (0)

## 2017-06-14 NOTE — NURSING NOTE
"Chief Complaint   Patient presents with     Recheck Medication     Discuss script for Chantix       Initial /82 (BP Location: Left arm, Patient Position: Chair, Cuff Size: Adult Large)  Pulse 66  Temp 98  F (36.7  C) (Temporal)  Resp 18  Wt 198 lb 12.8 oz (90.2 kg)  SpO2 98%  BMI 38.19 kg/m2 Estimated body mass index is 38.19 kg/(m^2) as calculated from the following:    Height as of 6/8/17: 5' 0.5\" (1.537 m).    Weight as of this encounter: 198 lb 12.8 oz (90.2 kg).  Medication Reconciliation: complete     Nargis Capellan MA 6/14/2017  10:11 AM          "

## 2017-06-14 NOTE — TELEPHONE ENCOUNTER
Noted pt on both schedules.     Anabela Arauz RN, [facility]  Pain Clinic Care Coordinator

## 2017-06-14 NOTE — MR AVS SNAPSHOT
"              After Visit Summary   6/14/2017    Lani Barry    MRN: [id number]           Patient Information     Date Of Birth          1955        Visit Information        Provider Department      6/14/2017 9:40 AM Cipriano Arias DO [facility]        Today's Diagnoses     Gastroesophageal reflux disease without esophagitis    -  1    Tobacco abuse        Morbid obesity due to excess calories (H)        Vitamin deficiency           Follow-ups after your visit        Your next 10 appointments already scheduled     Jul 10, 2017   Procedure with Misha Brady MD   [facility] ([facility])    [address] [place] [address]   [phone]           From [address]: Exit at [email or web address] on [address] side of [place]. Turn right on [email or web address]. Turn left at stoplight on [address]. [facility] will be in view two blocks ahead              Who to contact     If you have questions or need follow up information about today's clinic visit or your schedule please contact [facility] directly at [phone].  Normal or non-critical lab and imaging results will be communicated to you by MyChart, letter or phone within 4 business days after the clinic has received the results. If you do not hear from us within 7 days, please contact the clinic through SocialEarshart or phone. If you have a critical or abnormal lab result, we will notify you by phone as soon as possible.  Submit refill requests through Rotapanel or call your pharmacy and they will forward the refill request to us. Please allow 3 business days for your refill to be completed.          Additional Information About Your Visit        MyChart Information     Rotapanel lets you send messages to your doctor, view your test results, renew your prescriptions, schedule appointments and more. To sign up, go to [email or web address] . Click on \"Log in\" on the left " "side of the screen, which will take you to the Welcome page. Then click on \"Sign up Now\" on the right side of the page.     You will be asked to enter the access code listed below, as well as some personal information. Please follow the directions to create your username and password.     Your access code is: [email or web address]  Expires: 2017 10:24 AM     Your access code will  in 90 days. If you need help or a new code, please call your [place] clinic or [phone].        Care EveryWhere ID     This is your Care EveryWhere ID. This could be used by other organizations to access your [place] medical records  [id number]        Your Vitals Were     Pulse Temperature Respirations Pulse Oximetry BMI (Body Mass Index)       66 98  F (36.7  C) (Temporal) 18 98% 38.19 kg/m2        Blood Pressure from Last 3 Encounters:   17 118/82   05/10/17 93/49   17 122/72    Weight from Last 3 Encounters:   17 198 lb 12.8 oz (90.2 kg)   17 200 lb (90.7 kg)   17 203 lb (92.1 kg)              Today, you had the following     No orders found for display         Today's Medication Changes          These changes are accurate as of: 17 11:59 PM.  If you have any questions, ask your nurse or doctor.               Start taking these medicines.        Dose/Directions    pantoprazole 40 MG EC tablet   Commonly known as:  PROTONIX   Used for:  Gastroesophageal reflux disease without esophagitis   Started by:  Cipriano Arias DO        Dose:  40 mg   Take 1 tablet (40 mg) by mouth daily Take 30-60 minutes before a meal.   Quantity:  90 tablet   Refills:  1       varenicline 0.5 MG X 11 & 1 MG X 42 tablet   Commonly known as:  CHANTIX STARTING MONTH    Used for:  Tobacco abuse   Started by:  Cipriano Arias DO        Take 0.5 mg tab daily for 3 days, then 0.5 mg tab twice daily for 4 days, then 1 mg twice daily.   Quantity:  53 tablet   Refills:  0         These medicines have " changed or have updated prescriptions.        Dose/Directions    B-6 50 MG Tabs   This may have changed:    - how much to take  - how to take this  - when to take this   Used for:  Vitamin deficiency   Changed by:  Cipriano Arias DO        Dose:  2 capsule   Take 2 capsules by mouth daily   Quantity:  90 tablet   Refills:  1         Stop taking these medicines if you haven't already. Please contact your care team if you have questions.     nexIUM 40 MG CR capsule   Generic drug:  esomeprazole   Stopped by:  Cipriano Arias DO                Where to get your medicines      These medications were sent to [facility] - [address]  [address] [facility] [address]     Phone:  [phone]     B-6 50 MG Tabs    pantoprazole 40 MG EC tablet    varenicline 0.5 MG X 11 & 1 MG X 42 tablet                Primary Care Provider Office Phone # Fax #    Cipriano Arias [phone]       [address]   [facility] [address]        Thank you!     Thank you for choosing [facility]  for your care. Our goal is always to provide you with excellent care. Hearing back from our patients is one way we can continue to improve our services. Please take a few minutes to complete the written survey that you may receive in the mail after your visit with us. Thank you!             Your Updated Medication List - Protect others around you: Learn how to safely use, store and throw away your medicines at www.disposemymeds.org.          This list is accurate as of: 6/14/17 11:59 PM.  Always use your most recent med list.                   Brand Name Dispense Instructions for use    ASPIRIN PO      Take 81 mg by mouth       B-6 50 MG Tabs     90 tablet    Take 2 capsules by mouth daily       fluocinolone 0.01 % solution    SYNALAR    60 mL    APPLY TOPICALLY TWICE A DAY       meloxicam 15 MG tablet    MOBIC     30 tablet    TAKE 1 TABLET DAILY       pantoprazole 40 MG EC tablet    PROTONIX    90 tablet    Take 1 tablet (40 mg) by mouth daily Take 30-60 minutes before a meal.       varenicline 0.5 MG X 11 & 1 MG X 42 tablet    CHANTIX STARTING MONTH DIANA    53 tablet    Take 0.5 mg tab daily for 3 days, then 0.5 mg tab twice daily for 4 days, then 1 mg twice daily.

## 2017-06-14 NOTE — PROGRESS NOTES
SUBJECTIVE:                                                    Lani Barry is a 62 year old female who presents to clinic today for the following health issues:      Needs refill on vitamin B6      Chief Complaint   Patient presents with     Recheck Medication     Discuss script for Chantix       Medication Followup wanting to start Chantix           CHIEF COMPLAINT:    The patient has a history of B6 deficiency. She is a refill. She's been doing quite well without any complications. She has a history of smoking, she had been on Chantix the past was quite successful for several years but then began smoking with her recent move to [place]. She would like to try to use the Chantix again and get off of that. We have discussed this in detail as well as the concept of substitution of her habit. She is otherwise doing well and taking her medications compliantly. She takes the Crestor without difficulty and her sugars copy is happy with this. Her gastroesophageal reflux is controlled with the Nexium and her insurance He is not so happy with this. They recommended it be changed to something else, she did not have success with the omeprazole and we will therefore place her on generic Protonix.                         PAST, FAMILY,SOCIAL HISTORY:     Medical  History:   has a past medical history of Arthritis and Cerebral infarction (H).     Surgical History:   has a past surgical history that includes Release ulnar nerve (elbow) (Left, 8/30/2016).     Social History:   reports that she has been smoking Cigars.  She has never used smokeless tobacco. She reports that she does not drink alcohol or use illicit drugs.     Family History:  family history includes CANCER in her sister; CEREBROVASCULAR DISEASE in her sister; DIABETES in her paternal uncle.            MEDICATIONS  Current Outpatient Prescriptions   Medication Sig Dispense Refill     ASPIRIN PO Take 81 mg by mouth       Pyridoxine HCl (B-6) 50 MG TABS Take 2  capsules by mouth daily 90 tablet 1     pantoprazole (PROTONIX) 40 MG EC tablet Take 1 tablet (40 mg) by mouth daily Take 30-60 minutes before a meal. 90 tablet 1     varenicline (CHANTIX STARTING MONTH PAK) 0.5 MG X 11 & 1 MG X 42 tablet Take 0.5 mg tab daily for 3 days, then 0.5 mg tab twice daily for 4 days, then 1 mg twice daily. 53 tablet 0     fluocinolone (SYNALAR) 0.01 % solution APPLY TOPICALLY TWICE A DAY 60 mL 3     meloxicam (MOBIC) 15 MG tablet TAKE 1 TABLET DAILY 30 tablet 4     rosuvastatin (CRESTOR) 40 MG tablet Take 1 tablet (40 mg) by mouth daily 90 tablet 1         --------------------------------------------------------------------------------------------------------------------                          REVIEW OF SYSTEMS:         LUNGS: Pt denies: cough,excess sputum, hemoptysis, or shortness of breath.   HEART: Pt denies: chest pain, arrythmia, syncope, tachy or bradyarrhythmia or excess edema.   GI: Pt denies: nausea, vomitting, diarrhea, constipation, melena, or hematochezia.   NEURO: Pt denies: seizures, strokes, diplopia, weakness, paraesthesias, or paralysis.                          EXAMINATION:         /82 (BP Location: Left arm, Patient Position: Chair, Cuff Size: Adult Large)  Pulse 66  Temp 98  F (36.7  C) (Temporal)  Resp 18  Wt 198 lb 12.8 oz (90.2 kg)  SpO2 98%  BMI 38.19 kg/m2   Constitutional: The patient appears to be in no acute distress. The patient appears to be adequately hydrated. No acute respiratory or hemodynamic distress is noted at this time.   LUNGS: clear bilaterally, airflow is brisk, no intercostal retraction or stridor is noted. No coughing is noted during visit.   HEART:  regular without rubs, clicks, gallops, or murmurs. PMI is nondisplaced. Upstrokes are brisk. S1,S2 are heard.   GI: Abdomen is soft, without rebound, guarding or tenderness. Bowel sounds are appropriate. No renal bruits are heard. Abdomen is obese.   NEURO: Pt is alert and  appropriate. No neurologic lateralization is noted. Cranial nerves 2-12 are intact. Peripheral sensory and motor function are grossly normal                        DECISION MAKIN. Gastroesophageal reflux disease without esophagitis  Start Protonix  - pantoprazole (PROTONIX) 40 MG EC tablet; Take 1 tablet (40 mg) by mouth daily Take 30-60 minutes before a meal.  Dispense: 90 tablet; Refill: 1    2. Tobacco abuse  Start Chantix  Discussed substitution i.e. paperclips, gizmos etc  .- varenicline (CHANTIX STARTING MONTH ) 0.5 MG X 11 & 1 MG X 42 tablet; Take 0.5 mg tab daily for 3 days, then 0.5 mg tab twice daily for 4 days, then 1 mg twice daily.  Dispense: 53 tablet; Refill: 0    3. Morbid obesity due to excess calories (H)  Discussed weight loss through caloric restriction and exercise    4. Vitamin deficiency    - Pyridoxine HCl (B-6) 50 MG TABS; Take 2 capsules by mouth daily  Dispense: 90 tablet; Refill: 1                             FOLLOW UP    I have asked the patient to make an appointment for follow up with me in 1 month        I have carefully explained the diagnosis and treatment options with the patient. The patient has displayed an understanding of the above, and all subsequent questions were answered.         DO ADOLFO Barth    Portions of this note were produced using Wheelz  Although every attempt at real-time proof reading has been made, occasional grammar/syntax errors may have been missed.

## 2017-06-15 DIAGNOSIS — E78.5 HYPERLIPIDEMIA LDL GOAL <130: ICD-10-CM

## 2017-06-15 NOTE — TELEPHONE ENCOUNTER
Routing refill request to provider for review/approval because:  No FLP on file  Nikia Church RN

## 2017-06-15 NOTE — TELEPHONE ENCOUNTER
rosuvastatin (CRESTOR) 40 MG tablet     Last Written Prescription Date: 1/27/17  Last Fill Quantity: 90, # refills: 1  Last Office Visit with G, P or [facility] prescribing provider: 6/14/17       No results found for: CHOL  No results found for: HDL  No results found for: LDL  No results found for: TRIG  No results found for: CHOLHDLRATIO

## 2017-06-16 RX ORDER — ROSUVASTATIN CALCIUM 40 MG/1
TABLET, COATED ORAL
Qty: 90 TABLET | Refills: 0 | Status: SHIPPED | OUTPATIENT
Start: 2017-06-16 | End: 2017-09-04

## 2017-07-05 DIAGNOSIS — Z72.0 TOBACCO ABUSE: Primary | ICD-10-CM

## 2017-07-05 RX ORDER — VARENICLINE TARTRATE 1 MG/1
1 TABLET, FILM COATED ORAL 2 TIMES DAILY
Qty: 56 TABLET | Refills: 2 | Status: SHIPPED | OUTPATIENT
Start: 2017-07-05 | End: 2017-10-25

## 2017-07-10 ENCOUNTER — HOSPITAL ENCOUNTER (OUTPATIENT)
Facility: CLINIC | Age: 62
Discharge: HOME | End: 2017-07-10
Attending: ANESTHESIOLOGY | Admitting: ANESTHESIOLOGY
Payer: MEDICARE

## 2017-07-10 ENCOUNTER — HOSPITAL ENCOUNTER (OUTPATIENT)
Dept: GENERAL RADIOLOGY | Facility: CLINIC | Age: 62
End: 2017-07-10
Attending: ANESTHESIOLOGY | Admitting: ANESTHESIOLOGY
Payer: MEDICARE

## 2017-07-10 ENCOUNTER — SURGERY (OUTPATIENT)
Age: 62
End: 2017-07-10

## 2017-07-10 VITALS
DIASTOLIC BLOOD PRESSURE: 73 MMHG | RESPIRATION RATE: 18 BRPM | OXYGEN SATURATION: 99 % | BODY MASS INDEX: 37.38 KG/M2 | TEMPERATURE: 98.2 F | WEIGHT: 198 LBS | SYSTOLIC BLOOD PRESSURE: 135 MMHG | HEIGHT: 61 IN

## 2017-07-10 DIAGNOSIS — M54.16 LUMBAR RADICULOPATHY: ICD-10-CM

## 2017-07-10 PROCEDURE — 40000277 XR SURGERY CARM FLUORO LESS THAN 5 MIN W STILLS: Mod: TC

## 2017-07-10 PROCEDURE — 25000128 H RX IP 250 OP 636: Performed by: ANESTHESIOLOGY

## 2017-07-10 PROCEDURE — 25000125 ZZHC RX 250: Performed by: ANESTHESIOLOGY

## 2017-07-10 PROCEDURE — S0020 INJECTION, BUPIVICAINE HYDRO: HCPCS | Performed by: ANESTHESIOLOGY

## 2017-07-10 PROCEDURE — 64493 INJ PARAVERT F JNT L/S 1 LEV: CPT | Mod: 50 | Performed by: ANESTHESIOLOGY

## 2017-07-10 RX ORDER — METHYLPREDNISOLONE ACETATE 40 MG/ML
INJECTION, SUSPENSION INTRA-ARTICULAR; INTRALESIONAL; INTRAMUSCULAR; SOFT TISSUE PRN
Status: DISCONTINUED | OUTPATIENT
Start: 2017-07-10 | End: 2017-07-10 | Stop reason: HOSPADM

## 2017-07-10 RX ORDER — IOPAMIDOL 612 MG/ML
INJECTION, SOLUTION INTRATHECAL PRN
Status: DISCONTINUED | OUTPATIENT
Start: 2017-07-10 | End: 2017-07-10 | Stop reason: HOSPADM

## 2017-07-10 RX ORDER — BUPIVACAINE HYDROCHLORIDE 2.5 MG/ML
INJECTION, SOLUTION EPIDURAL; INFILTRATION; INTRACAUDAL PRN
Status: DISCONTINUED | OUTPATIENT
Start: 2017-07-10 | End: 2017-07-10 | Stop reason: HOSPADM

## 2017-07-10 RX ADMIN — METHYLPREDNISOLONE ACETATE 40 MG: 40 INJECTION, SUSPENSION INTRA-ARTICULAR; INTRALESIONAL; INTRAMUSCULAR; SOFT TISSUE at 09:00

## 2017-07-10 RX ADMIN — BUPIVACAINE HYDROCHLORIDE 2 ML: 2.5 INJECTION, SOLUTION EPIDURAL; INFILTRATION; INTRACAUDAL; PERINEURAL at 08:59

## 2017-07-10 RX ADMIN — IOPAMIDOL 2 ML: 612 INJECTION, SOLUTION INTRATHECAL at 09:00

## 2017-07-10 RX ADMIN — LIDOCAINE HYDROCHLORIDE 1 ML: 10 INJECTION, SOLUTION EPIDURAL; INFILTRATION; INTRACAUDAL; PERINEURAL at 08:59

## 2017-07-10 NOTE — IP AVS SNAPSHOT
MRN:[id number]                      After Visit Summary   7/10/2017    Lani Barry    MRN: [id number]           Thank you!     Thank you for choosing [facility] for your care. Our goal is always to provide you with excellent care. Hearing back from our patients is one way we can continue to improve our services. Please take a few minutes to complete the written survey that you may receive in the mail after you visit with us. Thank you!        Patient Information     Date Of Birth          1955        About your hospital stay     You were admitted on:  July 10, 2017 You last received care in the:  [facility] OR    You were discharged on:  July 10, 2017       Who to Call     For medical emergencies, please call 911.  For non-urgent questions about your medical care, please call your primary care provider or clinic, [phone]  For questions related to your surgery, please call your surgery clinic        Attending Provider     Provider Specialty    Misha Rae MD ANESTHESIOLOGY-PAIN MEDICINE       Primary Care Provider Office Phone # Fax #    Cipriano Miguel FisherDO monika [phone]      After Care Instructions     Discharge Instructions       [facility]   Procedure Discharge Instructions    Today you saw:    Dr. Misha Guardado, Dr. Bri Gilman    You had an:  Epidural steroid injection   sacro-iliac joint injection   facet joint injection  hip injection  piriformis injection     Medications used:  Lidocaine   Bupivacaine   Dexamethasone Depo-medrol  Omnipaque  Ropivicaine   Kenalog   Omniscan   Normal saline        If you have received sedation before, during, or after your procedure, for the next 24 hours you shall NOT:   -Drive  -Operate machinery  -Drink alcohol  -Sign any legal documents  Be cautious when walking. Numbness and/or weakness in the lower extremities may occur  for up to 6-8 hours after the procedure due to effect of the local anesthetic  Do not drive for 6 hours. The effect of the local anesthetic could slow your reflexes.   You may resume your regular activities after 24 hours  Avoid strenuous activity for the first 24 hours  You may shower, however avoid swimming, tub baths or hot tubs for 24 hours following your procedure  You may have a mild to moderate increase in pain for several days following the injection.  It may take up to 14 days for the steroid medication to start working although you may feel the effect as early as a few days after the procedure.     You may use ice packs for 10-15 minutes, 3 to 4 times a day at the injection site for comfort  Do not use heat to painful areas for 6 to 8 hours. This will give the local anesthetic time to wear off and prevent you from accidentally burning your skin.   You may use anti-inflammatory medications (such as Ibuprofen or Aleve or Advil) or Tylenol for pain control if necessary  If you were fasting, you may resume your normal diet and medications after the procedure  If you have diabetes, check your blood sugar more frequently than usual as your blood sugar may be higher than normal for 10-14 days following a steroid injection. Contact your doctor who manages your diabetes if your blood sugar is higher than usual  If you experience any of the following, call the pain center nursing line during work hours at [phone] or the after hours provider line at [phone]:  -Fever over 100 degree F  -Swelling, bleeding, redness, drainage, warmth at the injection site  -Progressive weakness or numbness in your legs or arms  -Loss of bowel or bladder function  -Unusual headache that is not relieved by Tylenol  -Unusual new onset of pain that is not improving    Phone #s:  Appointment line: [phone];  Nurse line: [phone]                  Pending Results     Date and Time Order Name Status Description    7/10/2017  "0819 XR Surgery WATSON L/T 5 Min Fluoro w Stills In process             Admission Information     Date & Time Provider Department Dept. Phone    7/10/2017 Misha Rae MD [facility] -[phone]      Your Vitals Were     Blood Pressure Temperature Respirations Height Weight Pulse Oximetry    135/73 98.2  F (36.8  C) 18 1.549 m (5' 1\") 89.8 kg (198 lb) 99%    BMI (Body Mass Index)                   37.41 kg/m2           pSivida Information     pSivida lets you send messages to your doctor, view your test results, renew your prescriptions, schedule appointments and more. To sign up, go to [email or web address] . Click on \"Log in\" on the left side of the screen, which will take you to the Welcome page. Then click on \"Sign up Now\" on the right side of the page.     You will be asked to enter the access code listed below, as well as some personal information. Please follow the directions to create your username and password.     Your access code is: [email or web address]  Expires: 2017 10:24 AM     Your access code will  in 90 days. If you need help or a new code, please call your [place] clinic or [phone].        Care EveryWhere ID     This is your Care EveryWhere ID. This could be used by other organizations to access your [place] medical records  [id number]        Equal Access to Services     [facility]: Hadii aad ku hadasho Sokavitaali, waaxda luqadaha, qaybta kaalmada adeegyada, tona de la torre . So [facility] [phone].    ATENCIÓN: Si habla español, tiene a reaves disposición servicios gratuitos de asistencia lingüística. Llame al [phone].    We comply with applicable federal civil rights laws and [place] laws. We do not discriminate on the basis of race, color, national origin, age, disability sex, sexual orientation or gender identity.               Review of your medicines      UNREVIEWED medicines. Ask your doctor about these medicines        Dose / " Directions    ASPIRIN PO        Dose:  81 mg   Take 81 mg by mouth   Refills:  0       B-6 50 MG Tabs   Used for:  Vitamin deficiency        Dose:  2 capsule   Take 2 capsules by mouth daily   Quantity:  90 tablet   Refills:  1       * CHANTIX STARTING MONTH DIANA 0.5 MG X 11 & 1 MG X 42 tablet   Used for:  Tobacco abuse   Generic drug:  varenicline        AT THE START OF THERAPY, TAKE 0.5 MG DAILY FOR 3 DAYS, THEN 0.5 MG TWICE A DAY FOR 4 DAYS, THEN 1 MG TWICE A DAY THEREAFTER   Quantity:  53 tablet   Refills:  1       * varenicline 1 MG tablet   Commonly known as:  CHANTIX   Used for:  Tobacco abuse        Dose:  1 mg   Take 1 tablet (1 mg) by mouth 2 times daily   Quantity:  56 tablet   Refills:  2       fluocinolone 0.01 % solution   Commonly known as:  SYNALAR   Used for:  Intrinsic eczema        APPLY TOPICALLY TWICE A DAY   Quantity:  60 mL   Refills:  3       meloxicam 15 MG tablet   Commonly known as:  MOBIC   Used for:  Cubital tunnel syndrome, left        TAKE 1 TABLET DAILY   Quantity:  30 tablet   Refills:  4       pantoprazole 40 MG EC tablet   Commonly known as:  PROTONIX   Used for:  Gastroesophageal reflux disease without esophagitis        Dose:  40 mg   Take 1 tablet (40 mg) by mouth daily Take 30-60 minutes before a meal.   Quantity:  90 tablet   Refills:  1       rosuvastatin 40 MG tablet   Commonly known as:  CRESTOR   Used for:  Hyperlipidemia LDL goal <130        TAKE 1 TABLET DAILY   Quantity:  90 tablet   Refills:  0       * Notice:  This list has 2 medication(s) that are the same as other medications prescribed for you. Read the directions carefully, and ask your doctor or other care provider to review them with you.             Protect others around you: Learn how to safely use, store and throw away your medicines at www.disposemymeds.org.             Medication List: This is a list of all your medications and when to take them. Check marks below indicate your daily home schedule. Keep this  list as a reference.      Medications           Morning Afternoon Evening Bedtime As Needed    ASPIRIN PO   Take 81 mg by mouth                                B-6 50 MG Tabs   Take 2 capsules by mouth daily                                * CHANTIX STARTING MONTH DIANA 0.5 MG X 11 & 1 MG X 42 tablet   AT THE START OF THERAPY, TAKE 0.5 MG DAILY FOR 3 DAYS, THEN 0.5 MG TWICE A DAY FOR 4 DAYS, THEN 1 MG TWICE A DAY THEREAFTER   Generic drug:  varenicline                                * varenicline 1 MG tablet   Commonly known as:  CHANTIX   Take 1 tablet (1 mg) by mouth 2 times daily                                fluocinolone 0.01 % solution   Commonly known as:  SYNALAR   APPLY TOPICALLY TWICE A DAY                                meloxicam 15 MG tablet   Commonly known as:  MOBIC   TAKE 1 TABLET DAILY                                pantoprazole 40 MG EC tablet   Commonly known as:  PROTONIX   Take 1 tablet (40 mg) by mouth daily Take 30-60 minutes before a meal.                                rosuvastatin 40 MG tablet   Commonly known as:  CRESTOR   TAKE 1 TABLET DAILY                                * Notice:  This list has 2 medication(s) that are the same as other medications prescribed for you. Read the directions carefully, and ask your doctor or other care provider to review them with you.

## 2017-07-10 NOTE — OP NOTE
Pre procedure Diagnosis: lumbar facet arthropathy, lumbar spondylosis   Post procedure Diagnosis: Same  Procedure performed: lumbar facet joint injections at L3-4 bilaterally, fluoroscopically guided, contrast controlled  Indication:  Therapeutic for pain  Anesthesia: none  Complication:  none  Operators: Misha Brady MD    Indications:   Lani Barry is a 62 year old female was sent by Dr. Doug Ashford for lumbar facet joint injections.  The patient has a history of prior lumbar fusion from L4-S1 with subsequent pain above the level of the fusion.  Exam shows lumbar tenderness and reproduction of pain with extension and lateral rotation of the lumbar spine.  They have tried conservative treatment including activity modifications, medications.    MRI lumbar spine completed on 6/9/17 was read as:  FINDINGS:This report assumes five lumbar type vertebrae.      The conus and visualized portions of the thoracic spinal cord appear  normal. The paraspinal soft tissues appear normal as visualized. The  bone marrow has normal signal intensity. A hemangioma is seen within  the L2 vertebral body. Patient is status post an anterior fusion at  L4-L5 and L5-S1. Anterior interbody fusion devices are in place.  Anterior plate is in place. Posterior decompression has also been  performed at the L4 level. Posterior instrumentation is seen extending  from L4-S1.     L1-L2:   Normal disc, facet joints, spinal canal and neural foramina.     L2-L3:  Mild annular disc bulge. Mild degenerative change in the facet  joints.     L3-L4:  Degeneration of the disc. Mild annular disc bulge. Moderate  degenerative change in the facet joints. Central canal normal. Neural  foramina appear patent.     L4-L5:  Anterior fusion. Posterior decompression and fusion. Central  canal and neural foramina appear patent.     L5-S1:  Anterior and posterior fusion. Central canal and neural  foramina are patent.       IMPRESSION:   1. Anterior fusions at  "L4-L5 and L5-S1. Posterior decompression and  fusion extending from L4-S1.  2. Mild degenerative change at L2-L3 and L3-L4.  3. No focal disc herniations are seen.    Options/alternatives, benefits and risks were discussed with the patient including bleeding, infection, flared pain, tissue trauma, exposure to radiation, reaction to medications including seizure, spinal cord injury, paralysis, weakness, numbness and headache.     Questions were answered to her satisfaction and she wishes to proceed. Voluntary informed consent was obtained and signed.     Vitals were reviewed: Yes  /73  Temp 98.2  F (36.8  C)  Resp 18  Ht 1.549 m (5' 1\")  Wt 89.8 kg (198 lb)  SpO2 99%  BMI 37.41 kg/m2  Allergies were reviewed:  Yes   Medications were reviewed:  Yes   Pre-procedure pain score: 6/10    Procedure:  After obtaining signed informed consent, the patient was brought into the procedure suite and was placed in a prone position on the procedure table.   A Pause for the Cause was performed.  The patient was prepped and draped in the usual sterile fashion.     Under AP fluoroscopic guidance the L3-4 facet joints were identified bilaterally and the C-arm was rotated obliquely to the affected side to open the joint space, first on the right, then on the left. A total of 3 ml of 1% lidocaine was injected at the needle entry points and needle tracts. Then 27 gauge 3.5 inch spinal needles were inserted and advanced under fluoroscopic guidance targeting the superior articular pillar of each joint. Once the needles made a contact with the SAP, they were rotated and advanced into the joints with positive pain reproduction bilaterally.    AP fluoroscopic views were obtained to confirm the needle placement. Then,  Isovue 300 contrast dye was injected after negative aspiration for heme and CSF in each joint, confirming appropriate placement.  A total of 0.5 ml of Isovue was used.  Isovue wasted:  14.5 ml.    The injection was " then accomplished using a solution containing 2 ml of 0.25% bupivacaine mixed with 40 mg of depomedrol, divided between the two joints (total injectate volume 3 ml). The needles were flushed with lidocaine 1% as they were removed.     Hemostasis was achieved, the area was cleaned, and bandaids were placed when appropriate.  The patient tolerated the procedure well, and was taken to the recovery room.    Images were saved to PACS.    Post-procedure pain score: 0/10  Follow-up includes:   -f/u phone call in one week  -f/u with referring provider    Misha Brady MD  [facility]

## 2017-07-10 NOTE — IP AVS SNAPSHOT
[facility] OR    [address] [place] [address] [address] [place] [address]    Phone:  [phone]                                       After Visit Summary   7/10/2017    Lani Barry    MRN: [id number]           After Visit Summary Signature Page     I have received my discharge instructions, and my questions have been answered. I have discussed any challenges I see with this plan with the nurse or doctor.    ..........................................................................................................................................  Patient/Patient Representative Signature      ..........................................................................................................................................  Patient Representative Print Name and Relationship to Patient    ..................................................               ................................................  Date                                            Time    ..........................................................................................................................................  Reviewed by Signature/Title    ...................................................              ..............................................  Date                                                            Time

## 2017-09-04 DIAGNOSIS — L20.84 INTRINSIC ECZEMA: ICD-10-CM

## 2017-09-04 DIAGNOSIS — E78.5 HYPERLIPIDEMIA LDL GOAL <130: ICD-10-CM

## 2017-09-05 NOTE — TELEPHONE ENCOUNTER
Fluocinolone      Last Written Prescription Date: 5/19/17  Last Fill Quantity: 60ml,  # refills: 3   Last Office Visit with [facility], [facility] or [facility] prescribing provider: 6/14/17                                             Rosuvastatin    Last Written Prescription Date: 6/16/17  Last Fill Quantity: 90, # refills: 0  Last Office Visit with [facility], [facility] or [facility] prescribing provider: 6/14/17       No results found for: CHOL  No results found for: HDL  No results found for: LDL  No results found for: TRIG  No results found for: AIMEE Lea MA 9/5/2017

## 2017-09-06 RX ORDER — FLUOCINOLONE ACETONIDE 0.1 MG/ML
SOLUTION TOPICAL
Qty: 60 ML | Refills: 3 | Status: SHIPPED | OUTPATIENT
Start: 2017-09-06 | End: 2019-01-06

## 2017-09-07 RX ORDER — ROSUVASTATIN CALCIUM 40 MG/1
TABLET, COATED ORAL
Qty: 90 TABLET | Refills: 0 | Status: SHIPPED | OUTPATIENT
Start: 2017-09-07 | End: 2017-10-25

## 2017-09-14 ENCOUNTER — HOSPITAL ENCOUNTER (EMERGENCY)
Facility: CLINIC | Age: 62
Discharge: HOME | End: 2017-09-14
Attending: FAMILY MEDICINE | Admitting: FAMILY MEDICINE
Payer: MEDICARE

## 2017-09-14 VITALS
HEIGHT: 61 IN | SYSTOLIC BLOOD PRESSURE: 150 MMHG | HEART RATE: 66 BPM | DIASTOLIC BLOOD PRESSURE: 81 MMHG | RESPIRATION RATE: 16 BRPM | TEMPERATURE: 97.2 F | OXYGEN SATURATION: 95 % | BODY MASS INDEX: 37.76 KG/M2 | WEIGHT: 200 LBS
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
## 2017-09-14 DIAGNOSIS — R51.9 HEADACHE, UNSPECIFIED HEADACHE TYPE: ICD-10-CM

## 2017-09-14 DIAGNOSIS — T63.441A BEE STING REACTION, ACCIDENTAL OR UNINTENTIONAL, INITIAL ENCOUNTER: ICD-10-CM

## 2017-09-14 PROCEDURE — 25000128 H RX IP 250 OP 636: Performed by: FAMILY MEDICINE

## 2017-09-14 PROCEDURE — 96365 THER/PROPH/DIAG IV INF INIT: CPT | Performed by: FAMILY MEDICINE

## 2017-09-14 PROCEDURE — 96375 TX/PRO/DX INJ NEW DRUG ADDON: CPT | Performed by: FAMILY MEDICINE

## 2017-09-14 PROCEDURE — 99284 EMERGENCY DEPT VISIT MOD MDM: CPT | Mod: 25 | Performed by: FAMILY MEDICINE

## 2017-09-14 PROCEDURE — S0028 INJECTION, FAMOTIDINE, 20 MG: HCPCS | Performed by: FAMILY MEDICINE

## 2017-09-14 PROCEDURE — 25000125 ZZHC RX 250: Performed by: FAMILY MEDICINE

## 2017-09-14 PROCEDURE — 99284 EMERGENCY DEPT VISIT MOD MDM: CPT | Mod: Z6 | Performed by: FAMILY MEDICINE

## 2017-09-14 PROCEDURE — 96367 TX/PROPH/DG ADDL SEQ IV INF: CPT | Performed by: FAMILY MEDICINE

## 2017-09-14 RX ORDER — KETOROLAC TROMETHAMINE 30 MG/ML
30 INJECTION, SOLUTION INTRAMUSCULAR; INTRAVENOUS ONCE
Status: COMPLETED | OUTPATIENT
Start: 2017-09-14 | End: 2017-09-14

## 2017-09-14 RX ORDER — MAGNESIUM SULFATE 1 G/100ML
1 INJECTION INTRAVENOUS ONCE
Status: COMPLETED | OUTPATIENT
Start: 2017-09-14 | End: 2017-09-14

## 2017-09-14 RX ADMIN — DEXAMETHASONE SODIUM PHOSPHATE 10 MG: 10 INJECTION, SOLUTION INTRAMUSCULAR; INTRAVENOUS at 19:28

## 2017-09-14 RX ADMIN — FAMOTIDINE 40 MG: 10 INJECTION, SOLUTION INTRAVENOUS at 18:38

## 2017-09-14 RX ADMIN — KETOROLAC TROMETHAMINE 30 MG: 30 INJECTION, SOLUTION INTRAMUSCULAR at 19:26

## 2017-09-14 RX ADMIN — MAGNESIUM SULFATE IN DEXTROSE 1 G: 10 INJECTION, SOLUTION INTRAVENOUS at 19:37

## 2017-09-14 RX ADMIN — PROCHLORPERAZINE EDISYLATE 10 MG: 5 INJECTION INTRAMUSCULAR; INTRAVENOUS at 19:32

## 2017-09-14 RX ADMIN — SODIUM CHLORIDE 1000 ML: 9 INJECTION, SOLUTION INTRAVENOUS at 18:37

## 2017-09-14 NOTE — ED PROVIDER NOTES
"                                                            [facility] ED Provider Note   CC:     Chief Complaint   Patient presents with     Insect Bite     HPI:  Lani Barry is a 62 year old female who presented to the emergency department presents to the ED complaining of a bee sting. Patient reports she was stung by a yellow jacket bee on her right foot. Patient knocked down a hive and thought all of the bees were dead but this one was \"half dead\" and in the grass when she stepped on his. She was bare foot at that time. Lani took 2 doses of benadryl immediately after the sting (around 1710). She currently has minimal nausea and a scratchy throat. She denies rash, shortness of breath, abdominal pain, and lip swelling. Patient had a reaction 22 years ago when he was stung by a yellow jacket, she was not able to breath and went to a hospital. Patient has recently stopped taking Nexium and Crestor a few weeks ago. She takes a baby aspirin. She doesn't take blood pressure medications. Lani just started smoking tobacco again about a week ago.    Problem List:  Patient Active Problem List    Diagnosis     Cubital tunnel syndrome, left     Lesion of left ulnar nerve     Gastroesophageal reflux disease without esophagitis     Hyperlipidemia LDL goal <130       MEDS:   Previous Medications    ASPIRIN PO    Take 81 mg by mouth    CHANTIX STARTING MONTH DIANA 0.5 MG X 11 & 1 MG X 42 TABLET    AT THE START OF THERAPY, TAKE 0.5 MG DAILY FOR 3 DAYS, THEN 0.5 MG TWICE A DAY FOR 4 DAYS, THEN 1 MG TWICE A DAY THEREAFTER    DIPHENHYDRAMINE HCL PO    Take 50 mg by mouth every 6 hours as needed    FLUOCINOLONE (SYNALAR) 0.01 % SOLUTION    APPLY TOPICALLY TWICE A DAY    MELOXICAM (MOBIC) 15 MG TABLET    TAKE 1 TABLET DAILY    PANTOPRAZOLE (PROTONIX) 40 MG EC TABLET    Take 1 tablet (40 mg) by mouth daily Take 30-60 minutes before a meal.    PYRIDOXINE HCL (B-6) 50 MG TABS    Take 2 capsules by mouth daily    ROSUVASTATIN " "(CRESTOR) 40 MG TABLET    TAKE 1 TABLET DAILY    VARENICLINE (CHANTIX) 1 MG TABLET    Take 1 tablet (1 mg) by mouth 2 times daily       ALLERGIES:    Allergies   Allergen Reactions     Bees        Past medical, surgical, family and social histories, triage and nursing notes were all reviewed.    Review of Systems   All other systems were reviewed and are negative    Physical Exam     Vitals were reviewed  Patient Vitals for the past 8 hrs:   BP Temp Temp src Pulse Heart Rate Resp SpO2 Height Weight   09/14/17 1924 150/81 - - - 64 18 99 % - -   09/14/17 1800 133/75 99  F (37.2  C) Oral 77 - 16 98 % 1.549 m (5' 1\") 90.7 kg (200 lb)     GENERAL APPEARANCE: Alert, slightly anxious, no distress; patient has slight cough and phlegm  FACE: normal facies  EYES: Pupils are equal  HENT: normal external exam; oral exam is benign.  Oral pharynx is patent and noninjected.  NECK: no adenopathy or asymmetry  RESP: normal respiratory effort; clear breath sounds bilaterally  CV: regular rate and rhythm; no significant murmurs, gallops or rubs  ABD: soft, no tenderness; no rebound or guarding; bowel sounds are normal  MS: no gross deformities noted; normal muscle tone.  EXT: Right medial foot with erythema from localized reaction to the bee sting  SKIN: no worrisome rash; specifically no hives noted  NEURO: no facial droop; no focal deficits, speech is normal        Available Lab/Imaging Results   No results found for this or any previous visit (from the past 24 hour(s)).      Impression     Final diagnoses:   Bee sting reaction   Headache, acute       ED Course & Medical Decision Making   Lani Barry is a 62 year old female who presented to the emergency department approximately 30-60 minutes after being stung by a yellow jacket to the right foot.  Patient reports that she had a severe reaction 22 years ago, and was told that if she ever got stung, to take 2 Benadryl tablets and come to the emergency department.  Patient arrived " with localized reaction from the bee sting to the right foot with some redness.  She did not have any hives, wheezing, lip or tongue swelling, or any vomiting.  She feels slightly nauseated and has a slight phlegmy cough.  She feels that her reflux has been exacerbated.  Patient had taken 2 Benadryl tablets before coming to the ED.  Patient received a liter of normal saline, and Pepcid 40 mg IV.  She then developed an acute migraine type headache, with a throbbing sensation into the frontal aspect of the head.  She states she has had migraines but it has been some time.  She has severe light sensitivity and the nausea.  Patient received IV Toradol, magnesium, Benadryl, Compazine, and Decadron.     8:29 PM: Patient feels much better, headache has almost completely resolved.  She has no further progression of her localized bee sting reaction.  Patient is medically stable for discharge home.      Written after-visit summary and instructions were given at the time of discharge.      New Prescriptions    No medications on file     This document serves as a record of services personally performed by Jose Lara MD. It was created on their behalf by Luke Casiano, a trained medical scribe. The creation of this record is based on the provider's personal observations and the statements of the patient. This document has been checked and approved by the attending provider.    This note was completed in part using Dragon voice recognition, and may contain word and grammatical errors.        Jose Lara MD  09/14/17 2029

## 2017-09-14 NOTE — ED AVS SNAPSHOT
[facility] Emergency Department    [address] [place] [address]    Phone:  [phone]    Fax:  [phone]                                       Lani Barry   MRN: [id number]    Department:  [facility] Emergency Department   Date of Visit:  9/14/2017           After Visit Summary Signature Page     I have received my discharge instructions, and my questions have been answered. I have discussed any challenges I see with this plan with the nurse or doctor.    ..........................................................................................................................................  Patient/Patient Representative Signature      ..........................................................................................................................................  Patient Representative Print Name and Relationship to Patient    ..................................................               ................................................  Date                                            Time    ..........................................................................................................................................  Reviewed by Signature/Title    ...................................................              ..............................................  Date                                                            Time

## 2017-09-14 NOTE — ED AVS SNAPSHOT
[facility] Emergency Department    [address] [place] [address]    Phone:  [phone]    Fax:  [phone]                                       Lani Barry   MRN: [id number]    Department:  [facility] Emergency Department   Date of Visit:  9/14/2017           Patient Information     Date Of Birth          1955        Your diagnoses for this visit were:     Bee sting reaction     Headache, acute        You were seen by Jose Lara MD.      Follow-up Information     Follow up with Cipriano Arias DO In 2 days.    Specialty:  Internal Medicine    Why:  if not improving    Contact information:    [address] [place] [id number] [phone]          Follow up with [facility] Emergency Department.    Specialty:  EMERGENCY MEDICINE    Why:  If symptoms worsen    Contact information:    [address] [place] [id number] [phone]    Additional information:    From [address]: Exit at [address] on south side of [place]. Turn right on [address]. Turn left at stoplight on [address]. [facility] will be in view two blocks ahead        Discharge Instructions       Thank you for giving us the opportunity to see you.  We are glad that you are feeling better.    You had a localized bee sting reaction without anaphylaxis, and start of a migraine headache.    Continue Benadryl as needed for itching of the area of the insect bite.  Watch for increased redness in that area.    Take Benadryl as needed, and Zantac for ureteral reflux symptoms.    The following medications were given during your stay: IV Pepcid, Toradol, Benadryl, Compazine, Decadron    If you are not seeing an improvement within 2-3 days, please follow up with your primary care provider or clinic.     After discharge, please closely monitor for any new or worsening symptoms. Return to the Emergency Department at any time if your symptoms  worsen.        Discharge References/Attachments     INSECT STING, LOCAL REACTION (ENGLISH)    HEADACHE, UNSPECIFIED (ENGLISH)      24 Hour Appointment Hotline       To make an appointment at any [facility], call [phone] ([phone]). If you don't have a family doctor or clinic, we will help you find one. [facility] are conveniently located to serve the needs of you and your family.             Review of your medicines      Our records show that you are taking the medicines listed below. If these are incorrect, please call your family doctor or clinic.        Dose / Directions Last dose taken    ASPIRIN PO   Dose:  81 mg        Take 81 mg by mouth   Refills:  0        B-6 50 MG Tabs   Dose:  2 capsule   Quantity:  90 tablet        Take 2 capsules by mouth daily   Refills:  1        * CHANTIX STARTING MONTH DIANA 0.5 MG X 11 & 1 MG X 42 tablet   Quantity:  53 tablet   Generic drug:  varenicline        AT THE START OF THERAPY, TAKE 0.5 MG DAILY FOR 3 DAYS, THEN 0.5 MG TWICE A DAY FOR 4 DAYS, THEN 1 MG TWICE A DAY THEREAFTER   Refills:  1        * varenicline 1 MG tablet   Commonly known as:  CHANTIX   Dose:  1 mg   Quantity:  56 tablet        Take 1 tablet (1 mg) by mouth 2 times daily   Refills:  2        DIPHENHYDRAMINE HCL PO   Dose:  50 mg        Take 50 mg by mouth every 6 hours as needed   Refills:  0        fluocinolone 0.01 % solution   Commonly known as:  SYNALAR   Quantity:  60 mL        APPLY TOPICALLY TWICE A DAY   Refills:  3        meloxicam 15 MG tablet   Commonly known as:  MOBIC   Quantity:  30 tablet        TAKE 1 TABLET DAILY   Refills:  4        pantoprazole 40 MG EC tablet   Commonly known as:  PROTONIX   Dose:  40 mg   Quantity:  90 tablet        Take 1 tablet (40 mg) by mouth daily Take 30-60 minutes before a meal.   Refills:  1        rosuvastatin 40 MG tablet   Commonly known as:  CRESTOR   Quantity:  90 tablet        TAKE 1 TABLET DAILY   Refills:  0        * Notice:  This  "list has 2 medication(s) that are the same as other medications prescribed for you. Read the directions carefully, and ask your doctor or other care provider to review them with you.            Orders Needing Specimen Collection     None      Pending Results     No orders found from 2017 to 9/15/2017.            Pending Culture Results     No orders found from 2017 to 9/15/2017.            Pending Results Instructions     If you had any lab results that were not finalized at the time of your Discharge, you can call the ED Lab Result RN at [phone]. You will be contacted by this team for any positive Lab results or changes in treatment. The nurses are available 7 days a week from 10A to 6:30P.  You can leave a message 24 hours per day and they will return your call.        Thank you for choosing [facility]       Thank you for choosing [facility] for your care. Our goal is always to provide you with excellent care. Hearing back from our patients is one way we can continue to improve our services. Please take a few minutes to complete the written survey that you may receive in the mail after you visit with us. Thank you!        [employer] Information     [employer] lets you send messages to your doctor, view your test results, renew your prescriptions, schedule appointments and more. To sign up, go to [email or web address] . Click on \"Log in\" on the left side of the screen, which will take you to the Welcome page. Then click on \"Sign up Now\" on the right side of the page.     You will be asked to enter the access code listed below, as well as some personal information. Please follow the directions to create your username and password.     Your access code is: [email or web address]  Expires: 2017  8:30 PM     Your access code will  in 90 days. If you need help or a new code, please call your [facility] clinic or [phone].        Care EveryWhere ID     This is your Care EveryWhere ID. This could be used by " other organizations to access your [facility] medical records  [id number]        Equal Access to Services     ELKIN PELAZE : Angela Funk, ronaldo garibay, tona bustamante. So [facility] [phone].    ATENCIÓN: Si habla español, tiene a reaves disposición servicios gratuitos de asistencia lingüística. Llame al [phone].    We comply with applicable federal civil rights laws and [place] laws. We do not discriminate on the basis of race, color, national origin, age, disability sex, sexual orientation or gender identity.            After Visit Summary       This is your record. Keep this with you and show to your community pharmacist(s) and doctor(s) at your next visit.

## 2017-09-14 NOTE — ED NOTES
Pt was stung by a bee approx 30 mintues ago.  Pt had a reaction 22 years ago.  Has a little dryness in throat and feels slightly dizzy.  VSS at this time.

## 2017-09-15 NOTE — DISCHARGE INSTRUCTIONS
Thank you for giving us the opportunity to see you.  We are glad that you are feeling better.    You had a localized bee sting reaction without anaphylaxis, and start of a migraine headache.    Continue Benadryl as needed for itching of the area of the insect bite.  Watch for increased redness in that area.    Take Benadryl as needed, and Zantac for ureteral reflux symptoms.    The following medications were given during your stay: IV Pepcid, Toradol, Benadryl, Compazine, Decadron    If you are not seeing an improvement within 2-3 days, please follow up with your primary care provider or clinic.     After discharge, please closely monitor for any new or worsening symptoms. Return to the Emergency Department at any time if your symptoms worsen.

## 2017-10-24 DIAGNOSIS — E78.5 HYPERLIPIDEMIA LDL GOAL <130: ICD-10-CM

## 2017-10-24 LAB
CHOLEST SERPL-MCNC: 294 MG/DL
HDLC SERPL-MCNC: 58 MG/DL
LDLC SERPL CALC-MCNC: 203 MG/DL
NONHDLC SERPL-MCNC: 236 MG/DL
TRIGL SERPL-MCNC: 163 MG/DL

## 2017-10-24 PROCEDURE — 80061 LIPID PANEL: CPT | Performed by: INTERNAL MEDICINE

## 2017-10-24 PROCEDURE — 36415 COLL VENOUS BLD VENIPUNCTURE: CPT | Performed by: INTERNAL MEDICINE

## 2017-10-25 ENCOUNTER — TELEPHONE (OUTPATIENT)
Dept: INTERNAL MEDICINE | Facility: CLINIC | Age: 62
End: 2017-10-25

## 2017-10-25 ENCOUNTER — OFFICE VISIT (OUTPATIENT)
Dept: FAMILY MEDICINE | Facility: OTHER | Age: 62
End: 2017-10-25
Payer: MEDICARE

## 2017-10-25 VITALS
OXYGEN SATURATION: 97 % | BODY MASS INDEX: 38.71 KG/M2 | TEMPERATURE: 98.1 F | HEIGHT: 61 IN | HEART RATE: 87 BPM | DIASTOLIC BLOOD PRESSURE: 62 MMHG | WEIGHT: 205 LBS | SYSTOLIC BLOOD PRESSURE: 102 MMHG

## 2017-10-25 DIAGNOSIS — E78.5 HYPERLIPIDEMIA LDL GOAL <130: Primary | ICD-10-CM

## 2017-10-25 DIAGNOSIS — Z71.6 ENCOUNTER FOR TOBACCO USE CESSATION COUNSELING: ICD-10-CM

## 2017-10-25 DIAGNOSIS — K21.9 GASTROESOPHAGEAL REFLUX DISEASE WITHOUT ESOPHAGITIS: ICD-10-CM

## 2017-10-25 DIAGNOSIS — Z71.89 ADVANCED DIRECTIVES, COUNSELING/DISCUSSION: ICD-10-CM

## 2017-10-25 PROBLEM — E66.01 MORBID OBESITY (H): Status: ACTIVE | Noted: 2017-10-25

## 2017-10-25 PROCEDURE — 99214 OFFICE O/P EST MOD 30 MIN: CPT | Performed by: INTERNAL MEDICINE

## 2017-10-25 ASSESSMENT — PAIN SCALES - GENERAL: PAINLEVEL: NO PAIN (0)

## 2017-10-25 NOTE — MR AVS SNAPSHOT
"              After Visit Summary   10/25/2017    Lani Barry    MRN: [id number]           Patient Information     Date Of Birth          1955        Visit Information        Provider Department      10/25/2017 8:40 AM Cipriano Arias DO [facility]        Today's Diagnoses     Hyperlipidemia LDL goal <130    -  1    Gastroesophageal reflux disease without esophagitis        Advanced directives, counseling/discussion        Encounter for tobacco use cessation counseling           Follow-ups after your visit        Who to contact     If you have questions or need follow up information about today's clinic visit or your schedule please contact [facility] directly at [phone].  Normal or non-critical lab and imaging results will be communicated to you by [employer], letter or phone within 4 business days after the clinic has received the results. If you do not hear from us within 7 days, please contact the clinic through [employer] or phone. If you have a critical or abnormal lab result, we will notify you by phone as soon as possible.  Submit refill requests through Noom or call your pharmacy and they will forward the refill request to us. Please allow 3 business days for your refill to be completed.          Additional Information About Your Visit        MyChart Information     Noom lets you send messages to your doctor, view your test results, renew your prescriptions, schedule appointments and more. To sign up, go to [email or web address] . Click on \"Log in\" on the left side of the screen, which will take you to the Welcome page. Then click on \"Sign up Now\" on the right side of the page.     You will be asked to enter the access code listed below, as well as some personal information. Please follow the directions to create your username and password.     Your access code is: [email or web address]  Expires: 2017  8:30 PM     Your access code will  in 90 days. If " "you need help or a new code, please call your [place] clinic or [phone].        Care EveryWhere ID     This is your Care EveryWhere ID. This could be used by other organizations to access your [place] medical records  [id number]        Your Vitals Were     Pulse Temperature Height Pulse Oximetry BMI (Body Mass Index)       87 98.1  F (36.7  C) (Temporal) 5' 1\" (1.549 m) 97% 38.73 kg/m2        Blood Pressure from Last 3 Encounters:   10/25/17 102/62   09/14/17 150/81   07/10/17 135/73    Weight from Last 3 Encounters:   10/25/17 205 lb (93 kg)   09/14/17 200 lb (90.7 kg)   07/10/17 198 lb (89.8 kg)              Today, you had the following     No orders found for display         Today's Medication Changes          These changes are accurate as of: 10/25/17  9:36 AM.  If you have any questions, ask your nurse or doctor.               Stop taking these medicines if you haven't already. Please contact your care team if you have questions.     CHANTIX STARTING MONTH DIANA 0.5 MG X 11 & 1 MG X 42 tablet   Generic drug:  varenicline   Stopped by:  Cipriano Arias DO                    Primary Care Provider Office Phone # Fax #    Cipriano Arias [phone] [address] [place] [address]        Equal Access to Services     [facility] AH: Hadii twila weaver hadasho Sokavitaali, waaxda luqadaha, qaybta kaalmada cecy, tona acosta. So [facility] [phone].    ATENCIÓN: Si habla español, tiene a reaves disposición servicios gratuitos de asistencia lingüística. Lynda vigil [phone].    We comply with applicable federal civil rights laws and [place] laws. We do not discriminate on the basis of race, color, national origin, age, disability, sex, sexual orientation, or gender identity.            Thank you!     Thank you for choosing [facility]  for your care. Our goal is always to provide you with excellent care. Hearing back from our " patients is one way we can continue to improve our services. Please take a few minutes to complete the written survey that you may receive in the mail after your visit with us. Thank you!             Your Updated Medication List - Protect others around you: Learn how to safely use, store and throw away your medicines at www.disposemymeds.org.          This list is accurate as of: 10/25/17  9:36 AM.  Always use your most recent med list.                   Brand Name Dispense Instructions for use Diagnosis    ASPIRIN PO      Take 81 mg by mouth        DIPHENHYDRAMINE HCL PO      Take 50 mg by mouth every 6 hours as needed        fluocinolone 0.01 % solution    SYNALAR    60 mL    APPLY TOPICALLY TWICE A DAY    Intrinsic eczema

## 2017-10-25 NOTE — TELEPHONE ENCOUNTER
** Patient Call Attempt With Normal Lab or Test Results**    I have attempted to contact this patient by phone with the following results: left message to return my call on answering machine.    When patient returns call, please advise of the following result.  If patient has further questions or concerns route call back to team, thank you.    The cholesterol is a markedly elevated with an LDL of 203.  Would recommend consideration for increasing the Crestor to 80 mg daily.    Thank you.  DO ADOLFO Barth CMA ([facility])

## 2017-10-25 NOTE — PROGRESS NOTES
Please contact the patient and notify her of the following:  The cholesterol is a markedly elevated with an LDL of 203.  Would recommend consideration for increasing the Crestor to 80 mg daily.    Thank you.  DO ADOLFO Barth

## 2017-10-25 NOTE — NURSING NOTE
"Chief Complaint   Patient presents with     Lipids       Initial /62 (BP Location: Left arm, Patient Position: Chair, Cuff Size: Adult Large)  Pulse 87  Temp 98.1  F (36.7  C) (Temporal)  Ht 5' 1\" (1.549 m)  Wt 205 lb (93 kg)  SpO2 97%  BMI 38.73 kg/m2 Estimated body mass index is 38.73 kg/(m^2) as calculated from the following:    Height as of this encounter: 5' 1\" (1.549 m).    Weight as of this encounter: 205 lb (93 kg).  Medication Reconciliation: complete   Tracy RAIN    "

## 2017-10-25 NOTE — PROGRESS NOTES
SUBJECTIVE:   Lani Barry is a 62 year old female who presents to clinic today for the following health issues:    Hyperlipidemia Follow-Up      Rate your low fat/cholesterol diet?: good    Taking statin?  Stopped due to peer pressure    Other lipid medications/supplements?:  none        Amount of exercise or physical activity: None    Problems taking medications regularly: No    Medication side effects: none    Diet: low fat/cholesterol            CHIEF COMPLAINT:    The patient is a pleasant 62-year-old female who presents today for follow-up of her hyperlipidemia. She was previously on 40 mg of Crestor daily but now notes that she has not taken it for some time. She saw an ad on TV sponsored by a reputable law firm suggesting that most of her medications were going to kill her at any moment. However, before her impending demise she was instructed to contact them to become part of a lucrative class action suit. Recognizing that this was a dangerous situation, she discontinued the medications. She continues to smoke but is taking the Chantix and notes that she is down to just a couple cigarettes a day. We've had a lengthy discussion regarding non-medication therapy for hyperlipidemia. Her LDL is over 200 as of yesterday. Additionally, she stopped taking her PPI for the gastritis as this to was adequately represented on late-night television. She is now taking apple cider vinegar at the twinge of reflux symptoms and notes that this does seem to be helping. We've discussed the possibility of a H2 blocker, she would prefer just to stick with the vinegar. Otherwise, she's been doing well. She is preparing to go to [facility] over winter. She will be back until April.                         PAST, FAMILY,SOCIAL HISTORY:     Medical  History:   has a past medical history of Arthritis and Cerebral infarction (H).     Surgical History:   has a past surgical history that includes Release ulnar nerve (elbow)  "(Left, 8/30/2016) and Inject facet joint (N/A, 7/10/2017).     Social History:   reports that she has been smoking Cigars.  She has been smoking about 0.10 packs per day. She has never used smokeless tobacco. She reports that she does not drink alcohol or use illicit drugs.     Family History:  family history includes CANCER in her sister; CEREBROVASCULAR DISEASE in her sister; DIABETES in her paternal uncle.            MEDICATIONS  Current Outpatient Prescriptions   Medication Sig Dispense Refill     DIPHENHYDRAMINE HCL PO Take 50 mg by mouth every 6 hours as needed       fluocinolone (SYNALAR) 0.01 % solution APPLY TOPICALLY TWICE A DAY 60 mL 3     ASPIRIN PO Take 81 mg by mouth           --------------------------------------------------------------------------------------------------------------------                          REVIEW OF SYSTEMS:         LUNGS: Pt denies: cough,excess sputum, hemoptysis, or shortness of breath.   HEART: Pt denies: chest pain, arrythmia, syncope, tachy or bradyarrhythmia or excess edema.   GI: Pt denies: nausea, vomitting, diarrhea, constipation, melena, or hematochezia.   NEURO: Pt denies: seizures, strokes, diplopia, weakness, paraesthesias, or paralysis.   SKIN: Pt denies: itching, rashes, discoloration, or specific lesions of concern. Denies recent hair loss.   PSYCH: The patient denies significant depression, anxiety, mood imbalance. Specifically denies any suicidal ideation.                          EXAMINATION:         /62 (BP Location: Left arm, Patient Position: Chair, Cuff Size: Adult Large)  Pulse 87  Temp 98.1  F (36.7  C) (Temporal)  Ht 5' 1\" (1.549 m)  Wt 205 lb (93 kg)  SpO2 97%  BMI 38.73 kg/m2   Constitutional: The patient appears to be in no acute distress. The patient appears to be adequately hydrated. No acute respiratory or hemodynamic distress is noted at this time.   LUNGS: clear bilaterally, airflow is brisk, no intercostal retraction or stridor " is noted. No coughing is noted during visit.   HEART:  regular without rubs, clicks, gallops, or murmurs. PMI is nondisplaced. Upstrokes are brisk. S1,S2 are heard.   GI: Abdomen is soft, without rebound, guarding or tenderness. Bowel sounds are appropriate. No renal bruits are heard.    NEURO: Pt is alert and appropriate. No neurologic lateralization is noted. Cranial nerves 2-12 are intact. Peripheral sensory and motor function are grossly normal                        DECISION MAKIN. Hyperlipidemia LDL goal <130  As a compromise, I have recommended the use of fish oil. She will try to limit the fatty foods and cholesterol in her diet and take 2 omega-3 fatty acid capsules twice daily.    2. Gastroesophageal reflux disease without esophagitis  The patient will continue to use her vinegar as she chooses. Have recommended that H2 blockers are safe and effective if she chooses to use his over-the-counter      3. Advanced directives, counseling/discussion  Discussed   4. Encounter for tobacco use cessation counseling  Patient continues to decrease her smoking but does live with an active smoker. Have discussed the concept of secondhand smoke.      The patient is obese. Recommend weight loss through responsible dietary restriction and exercise as tolerated.                                 FOLLOW UP    I have asked the patient to make an appointment for follow up with me in April when she returns to [facility]            I have carefully explained the diagnosis and treatment options with the patient. The patient has displayed an understanding of the above, and all subsequent questions were answered.         DO ADOLFO Barth    Portions of this note were produced using [employer]  Although every attempt at real-time proof reading has been made, occasional grammar/syntax errors may have been missed.

## 2018-05-08 ENCOUNTER — RADIANT APPOINTMENT (OUTPATIENT)
Dept: MAMMOGRAPHY | Facility: OTHER | Age: 63
End: 2018-05-08
Attending: INTERNAL MEDICINE
Payer: MEDICARE

## 2018-05-08 DIAGNOSIS — Z12.31 VISIT FOR SCREENING MAMMOGRAM: ICD-10-CM

## 2018-05-08 PROCEDURE — 77067 SCR MAMMO BI INCL CAD: CPT | Mod: TC

## 2018-05-17 ENCOUNTER — TELEPHONE (OUTPATIENT)
Dept: PALLIATIVE MEDICINE | Facility: CLINIC | Age: 63
End: 2018-05-17

## 2018-05-17 NOTE — TELEPHONE ENCOUNTER
Pain Management Center Referral      1. Confirmed address with patient? Yes  2. Confirmed phone number with patient? Yes  3. Confirmed referring provider? Yes  4. Is the PCP the same as the referring provider? No  5. Has the patient been to any previous pain clinics? Yes - in [place]  (If yes, send BLAINE with welcome letter)  6. Which insurance are we to bill for this appointment?  Medicare,     7. Informed pt of cancellation (48 hour) policy? Yes    REGARDING OPIOID MEDICATIONS: We will always address appropriateness of opioid pain medications, but we generally will not automatically take on a prescribing role. When we do take on prescribing of opioids for chronic pain, it is in collaboration with the referring physician for an intermediate period of time (months), with an expectation that the primary physician or provider will assume the prescribing role if medications are effective at stable doses with demonstrated compliance. Therefore, please do not assume that your prescribing responsibilities end on the day of pain clinic consultation.  7. Informed pt of prescribing policy? Yes      8. Referring Provider: Dr. Doug Valle    [facility]

## 2018-05-17 NOTE — LETTER
May 17, 2018    Lani Barry  [address]  [facility] [address]    Dear Lani,     Welcome to the [facility].  We are located on the 2nd floor ([address]) of the [facility], located at [address] [place], [place] [address], [place] [address].    Your appointment at the [facility] has been scheduled on Thursday, July 12TH at 10:00AM with Jean Marie Wong MD.    At your first visit, you will meet your team of caregivers who will help you to develop pain management strategies that will last a lifetime. You will meet with our support staff to review your insurance information, and collect your co-payment if required by your insurance company. You will also meet with a medical pain specialist and care coordinator who will assess your pain and develop a plan of care for your successful pain rehabilitation. You should expect to spend 1-2 hours at your first visit with us. Usually, patients work with us for a period of 6-12 months, and eventually return to their primary doctor once their pain management has stabilized.      To help us make your visit go as smoothly as possible, please bring the following items with you on your visit:     Completed Pain Questionnaire enclosed in this packet.  If you do not bring the completed questionnaire, we may have to reschedule your appointment.  List of any medicines that you are currently taking or have been prescribed  Important NON-[facility] medical information such as medical records or tests results (X-rays, or laboratory tests)  Your health insurance card  Financial resources to cover your co-payment or balance due at the time of service (cash, personal check, Visa, and MasterCard are acceptable methods of payment)     Due to the demand for new patient evaluations, you must notify the scheduling department 48 hours in advance if you are not able to keep this appointment. Failure to do so could affect your ability to reschedule with our clinic.  Please be aware that we will not prescribe any medications at your first visit.     Please call [phone] with any questions regarding your appointment. We look forward to meeting you and working to address your health care needs.     Sincerely,      [facility]

## 2018-05-17 NOTE — TELEPHONE ENCOUNTER
LM for patient to schedule new patient evaluation (schedule with interventionalist - patient interested in procedures as well)      Loulou Valle    [facility]

## 2018-05-18 ENCOUNTER — OFFICE VISIT (OUTPATIENT)
Dept: ORTHOPEDICS | Facility: CLINIC | Age: 63
End: 2018-05-18
Payer: MEDICARE

## 2018-05-18 VITALS
DIASTOLIC BLOOD PRESSURE: 70 MMHG | TEMPERATURE: 97.8 F | WEIGHT: 204.6 LBS | SYSTOLIC BLOOD PRESSURE: 110 MMHG | HEIGHT: 61 IN | BODY MASS INDEX: 38.63 KG/M2

## 2018-05-18 DIAGNOSIS — R20.0 NUMBNESS AND TINGLING IN LEFT HAND: ICD-10-CM

## 2018-05-18 DIAGNOSIS — R20.2 NUMBNESS AND TINGLING IN LEFT HAND: ICD-10-CM

## 2018-05-18 DIAGNOSIS — Z98.890 S/P CUBITAL TUNNEL RELEASE: Primary | ICD-10-CM

## 2018-05-18 PROCEDURE — 99213 OFFICE O/P EST LOW 20 MIN: CPT | Performed by: ORTHOPAEDIC SURGERY

## 2018-05-18 RX ORDER — VARENICLINE TARTRATE 1 MG/1
1 TABLET, FILM COATED ORAL 2 TIMES DAILY
COMMUNITY
Start: 2018-05-18 | End: 2018-06-13

## 2018-05-18 ASSESSMENT — PAIN SCALES - GENERAL: PAINLEVEL: NO PAIN (0)

## 2018-05-18 NOTE — MR AVS SNAPSHOT
"              After Visit Summary   5/18/2018    Lani Barry    MRN: [id number]           Patient Information     Date Of Birth          1955        Visit Information        Provider Department      5/18/2018 1:50 PM Steve Parks DO [facility]        Today's Diagnoses     Bilateral hand pain    -  1    S/P cubital tunnel release           Follow-ups after your visit        Your next 10 appointments already scheduled     Jul 12, 2018 10:00 AM CDT   New Visit with Luigi Wong MD   [facility] ([facility])    [address]  [place] [address] [phone]              Who to contact     If you have questions or need follow up information about today's clinic visit or your schedule please contact [facility] directly at [phone].  Normal or non-critical lab and imaging results will be communicated to you by MyChart, letter or phone within 4 business days after the clinic has received the results. If you do not hear from us within 7 days, please contact the clinic through MyChart or phone. If you have a critical or abnormal lab result, we will notify you by phone as soon as possible.  Submit refill requests through rVue or call your pharmacy and they will forward the refill request to us. Please allow 3 business days for your refill to be completed.          Additional Information About Your Visit        MyChart Information     rVue lets you send messages to your doctor, view your test results, renew your prescriptions, schedule appointments and more. To sign up, go to [email or web address] . Click on \"Log in\" on the left side of the screen, which will take you to the Welcome page. Then click on \"Sign up Now\" on the right side of the page.     You will be asked to enter the access code listed below, as well as some personal information. Please follow the directions to create your username and " "password.     Your access code is: [email or web address]  Expires: 2018  2:02 PM     Your access code will  in 90 days. If you need help or a new code, please call your [place] clinic or [phone].        Care EveryWhere ID     This is your Care EveryWhere ID. This could be used by other organizations to access your [place] medical records  [id number]        Your Vitals Were     Temperature Height BMI (Body Mass Index)             97.8  F (36.6  C) (Temporal) 1.543 m (5' 0.75\") 38.98 kg/m2          Blood Pressure from Last 3 Encounters:   18 110/70   10/25/17 102/62   17 150/81    Weight from Last 3 Encounters:   18 92.8 kg (204 lb 9.6 oz)   10/25/17 93 kg (205 lb)   17 90.7 kg (200 lb)               Primary Care Provider Office Phone # Fax #    Cipriano Miguel Arias [phone]       [address] [place] [address]        Equal Access to Services     [facility] AH: Hadii aad ku hadasho Soomaali, waaxda luqadaha, qaybta kaalmada adeegyada, waxay robin martinesn gaby acosta. So [facility] [phone].    ATENCIÓN: Si habla español, tiene a reaves disposición servicios gratuitos de asistencia lingüística. Llame al [phone].    We comply with applicable federal civil rights laws and [place] laws. We do not discriminate on the basis of race, color, national origin, age, disability, sex, sexual orientation, or gender identity.            Thank you!     Thank you for choosing [facility]  for your care. Our goal is always to provide you with excellent care. Hearing back from our patients is one way we can continue to improve our services. Please take a few minutes to complete the written survey that you may receive in the mail after your visit with us. Thank you!             Your Updated Medication List - Protect others around you: Learn how to safely use, store and throw away your medicines at www.disposemymeds.org.          This list is " accurate as of 5/18/18  2:02 PM.  Always use your most recent med list.                   Brand Name Dispense Instructions for use Diagnosis    ASPIRIN PO      Take 81 mg by mouth        DIPHENHYDRAMINE HCL PO      Take 50 mg by mouth every 6 hours as needed        fluocinolone 0.01 % solution    SYNALAR    60 mL    APPLY TOPICALLY TWICE A DAY    Intrinsic eczema       varenicline 1 MG tablet    CHANTIX     Take 1 tablet (1 mg) by mouth 2 times daily

## 2018-05-18 NOTE — PROGRESS NOTES
"Office Visit-Follow up    Chief Complaint: Lani Barry is a 63 year old female who is being seen for   Chief Complaint   Patient presents with     RECHECK     left pinky and ring finger follow up     Surgical Followup     DOS: 8/30/2016~In situ left ulnar nerve decompression at the elbow.~ 1 year and 9 months       History of Present Illness:   Returns to clinic.  Last seen in Sept. 2016 at 2 week post-op visit from left ulnar nerve decompression. She states after surgery the numbness/tinglnig, pins and needles to her left pinky and ring finger did get better, did not completely resolved but got better, then about 6 months after surgery started to get worse, has been progressively getting worse, now to the point that the symptoms are worse than prior to surgery. No symptoms in thumb, index, middle finger.  She states numbness/tingling nearly constant in ring and pinky finger, occasional shooting pins and needles from fingers to elbow.  Also feels like loosing  strength.  Fine motor tasks, griping with the pinky and ring finger worsen the symptoms.  Tried massaging the hand and elbow which helps some. No other therapies tried.      REVIEW OF SYSTEMS  General: negative for, night sweats, dizziness, fatigue  Resp: No shortness of breath and no cough  CV: negative for chest pain, syncope or near-syncope  GI: negative for nausea, vomiting and diarrhea  : negative for dysuria and hematuria  Musculoskeletal: as above  Neurologic: negative for syncope   Hematologic: negative for bleeding disorder    Physical Exam:  Vitals: /70 (BP Location: Right arm, Patient Position: Chair, Cuff Size: Adult Large)  Temp 97.8  F (36.6  C) (Temporal)  Ht 1.543 m (5' 0.75\")  Wt 92.8 kg (204 lb 9.6 oz)  BMI 38.98 kg/m2  BMI= Body mass index is 38.98 kg/(m^2).  Constitutional: healthy, alert and no acute distress   Psychiatric: mentation appears normal and affect normal/bright  NEURO: no focal deficits  RESP: Normal with " easy respirations and no use of accessory muscles to breathe, no audible wheezing or retractions  CV: LUE:   no edema         Regular rate and rhythm by palpation  SKIN: No erythema, rashes, excoriation, or breakdown. No evidence of infection.  Previous well approximated and healed incisional scar to elbow. Previous well healed incision to left midline proximal wrist   JOINT/EXTREMITIES:left hand/wrist: inspection: no atrophy/wasting including between web space of thumb and index.  No deformity. No bruising.   Palpation: non-tender.  Decreased sensation to left pinky and ring finger throughout entire digit.  Non-tender throughout wrist, hands, digits.  Full ROM with thumb to finger opposition. Full ROM to fingers.  Full ROM to wrist. Full ROM to elbow.  Non-tender to palpation.  Skin pink warm dry, cap refill <2, radial pulse 2+.  Phalen's maneuver to wrist caused worsening symptoms in pinky/ring finger but no symptoms in other fingers.  +Tinel's to elbow.  - Tinel's to wrist.  No gross atrophy although some weakness with ulnar motor testing.      Diagnostic Modalities:  None today.  Previous imaging reviewed.  Independent visualization of the images was performed.      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  Symptoms isolated to pinky and ring finger.  Symptoms now worse than prior to first surgery.  Recommended new EMG to the LUE especially with a positive Phalen's to the wrist bringing up questionable ulnar nerve compression at wrist.  Will have patient return after EMG.    Return to clinic to discuss test results, or sooner as needed for changes.  Re-x-ray on return: No    Scribed by:  Osorio Burleson, APRN, CNP, DNP  1:50 PM  5/18/2018    I attest I have seen and evaluated the patient.  I agree with above impression and plan.  Madan Parks  MARA.

## 2018-05-18 NOTE — LETTER
"    5/18/2018         RE: Lani Barry  [address] [facility] [address]        Dear Colleague,    Thank you for referring your patient, Lani Barry, to the [facility]. Please see a copy of my visit note below.    Office Visit-Follow up    Chief Complaint: Lani Barry is a 63 year old female who is being seen for   Chief Complaint   Patient presents with     RECHECK     left pinky and ring finger follow up     Surgical Followup     DOS: 8/30/2016~In situ left ulnar nerve decompression at the elbow.~ 1 year and 9 months       History of Present Illness:   Returns to clinic.  Last seen in Sept. 2016 at 2 week post-op visit from left ulnar nerve decompression. She states after surgery the numbness/tinglnig, pins and needles to her left pinky and ring finger did get better, did not completely resolved but got better, then about 6 months after surgery started to get worse, has been progressively getting worse, now to the point that the symptoms are worse than prior to surgery. No symptoms in thumb, index, middle finger.  She states numbness/tingling nearly constant in ring and pinky finger, occasional shooting pins and needles from fingers to elbow.  Also feels like loosing  strength.  Fine motor tasks, griping with the pinky and ring finger worsen the symptoms.  Tried massaging the hand and elbow which helps some. No other therapies tried.      REVIEW OF SYSTEMS  General: negative for, night sweats, dizziness, fatigue  Resp: No shortness of breath and no cough  CV: negative for chest pain, syncope or near-syncope  GI: negative for nausea, vomiting and diarrhea  : negative for dysuria and hematuria  Musculoskeletal: as above  Neurologic: negative for syncope   Hematologic: negative for bleeding disorder    Physical Exam:  Vitals: /70 (BP Location: Right arm, Patient Position: Chair, Cuff Size: Adult Large)  Temp 97.8  F (36.6  C) (Temporal)  Ht 1.543 m (5' 0.75\")  Wt 92.8 kg (204 " lb 9.6 oz)  BMI 38.98 kg/m2  BMI= Body mass index is 38.98 kg/(m^2).  Constitutional: healthy, alert and no acute distress   Psychiatric: mentation appears normal and affect normal/bright  NEURO: no focal deficits  RESP: Normal with easy respirations and no use of accessory muscles to breathe, no audible wheezing or retractions  CV: LUE:   no edema         Regular rate and rhythm by palpation  SKIN: No erythema, rashes, excoriation, or breakdown. No evidence of infection.  Previous well approximated and healed incisional scar to elbow. Previous well healed incision to left midline proximal wrist   JOINT/EXTREMITIES:left hand/wrist: inspection: no atrophy/wasting including between web space of thumb and index.  No deformity. No bruising.   Palpation: non-tender.  Decreased sensation to left pinky and ring finger throughout entire digit.  Non-tender throughout wrist, hands, digits.  Full ROM with thumb to finger opposition. Full ROM to fingers.  Full ROM to wrist. Full ROM to elbow.  Non-tender to palpation.  Skin pink warm dry, cap refill <2, radial pulse 2+.  Phalen's maneuver to wrist caused worsening symptoms in pinky/ring finger but no symptoms in other fingers.  +Tinel's to elbow.  - Tinel's to wrist.  No gross atrophy although some weakness with ulnar motor testing.      Diagnostic Modalities:  None today.  Previous imaging reviewed.  Independent visualization of the images was performed.      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  Symptoms isolated to pinky and ring finger.  Symptoms now worse than prior to first surgery.  Recommended new EMG to the LUE especially with a positive Phalen's to the wrist bringing up questionable ulnar nerve compression at wrist.  Will have patient return after EMG.    Return to clinic to discuss test  results, or sooner as needed for changes.  Re-x-ray on return: No    Scribed by:  Osorio Burleson, APRN, CNP, DNP  1:50 PM  5/18/2018    I attest I have seen and evaluated the patient.  I agree with above impression and plan.  Madan Parks D.O.          Again, thank you for allowing me to participate in the care of your patient.        Sincerely,        Steve Parks, DO

## 2018-06-04 ENCOUNTER — TRANSFERRED RECORDS (OUTPATIENT)
Dept: HEALTH INFORMATION MANAGEMENT | Facility: CLINIC | Age: 63
End: 2018-06-04

## 2018-06-07 ENCOUNTER — TELEPHONE (OUTPATIENT)
Dept: ORTHOPEDICS | Facility: CLINIC | Age: 63
End: 2018-06-07

## 2018-06-07 ENCOUNTER — OFFICE VISIT (OUTPATIENT)
Dept: ORTHOPEDICS | Facility: CLINIC | Age: 63
End: 2018-06-07
Payer: MEDICARE

## 2018-06-07 VITALS
TEMPERATURE: 98 F | BODY MASS INDEX: 38.51 KG/M2 | DIASTOLIC BLOOD PRESSURE: 70 MMHG | WEIGHT: 204 LBS | HEIGHT: 61 IN | SYSTOLIC BLOOD PRESSURE: 120 MMHG

## 2018-06-07 DIAGNOSIS — G56.22 CUBITAL TUNNEL SYNDROME, LEFT: Primary | ICD-10-CM

## 2018-06-07 PROCEDURE — 99213 OFFICE O/P EST LOW 20 MIN: CPT | Performed by: ORTHOPAEDIC SURGERY

## 2018-06-07 ASSESSMENT — PAIN SCALES - GENERAL: PAINLEVEL: MILD PAIN (3)

## 2018-06-07 NOTE — TELEPHONE ENCOUNTER
Type of surgery: Left ulnar nerve decompression with transposition  Location of surgery: [facility]   Date of surgery: 6/19/18  Surgeon: Dr. Parks  Pre-Op Appt Date: 6/13/18  Post-Op Appt Date: 6/29/18   Packet sent out: Surgery packet was given to patient in clinic.   Pre-cert/Authorization completed: NA  Date: 6/7/2018    Leatha Lam  Surgery Scheduler

## 2018-06-07 NOTE — LETTER
6/7/2018         RE: Lani Barry  [address] [facility] [address]        Dear Colleague,    Thank you for referring your patient, Lani Barry, to the [facility]. Please see a copy of my visit note below.    Office Visit-Follow up    Chief Complaint: Lani Barry is a 63 year old female who is being seen for   Chief Complaint   Patient presents with     RECHECK     emg results       History of Present Illness:   Today's visit:  Returns to discuss her left arm.  She reports cramping a few times a day with any type of fine motor skills to the small and ring finger.  Extremely painful.  She is dropped objects including a class.  She has near constant numbness and tingling.  May 18, 2018 visit:  Returns to clinic.  Last seen in Sept. 2016 at 2 week post-op visit from left ulnar nerve decompression. She states after surgery the numbness/tinglnig, pins and needles to her left pinky and ring finger did get better, did not completely resolved but got better, then about 6 months after surgery started to get worse, has been progressively getting worse, now to the point that the symptoms are worse than prior to surgery. No symptoms in thumb, index, middle finger.  She states numbness/tingling nearly constant in ring and pinky finger, occasional shooting pins and needles from fingers to elbow.  Also feels like loosing  strength.  Fine motor tasks, griping with the pinky and ring finger worsen the symptoms.  Tried massaging the hand and elbow which helps some. No other therapies tried.      REVIEW OF SYSTEMS  General: negative for, night sweats, dizziness, fatigue  Resp: No shortness of breath and no cough  CV: negative for chest pain, syncope or near-syncope  GI: negative for nausea, vomiting and diarrhea  : negative for dysuria and hematuria  Musculoskeletal: as above  Neurologic: negative for syncope   Hematologic: negative for bleeding disorder    Physical Exam:  Vitals: /70 (BP  "Location: Right arm, Patient Position: Chair, Cuff Size: Adult Large)  Temp 98  F (36.7  C) (Temporal)  Ht 5' 0.75\" (1.543 m)  Wt 204 lb (92.5 kg)  BMI 38.86 kg/m2  BMI= Body mass index is 38.86 kg/(m^2).  Constitutional: healthy, alert and no acute distress   Psychiatric: mentation appears normal and affect normal/bright  NEURO: no focal deficits  RESP: Normal with easy respirations and no use of accessory muscles to breathe, no audible wheezing or retractions  CV: LUE:   no edema         Regular rate and rhythm by palpation  SKIN: No erythema, rashes, excoriation, or breakdown. No evidence of infection.   JOINT/EXTREMITIES:left double/hand: Full range of motion.  There is no atrophy.  Positive Tinel's at the elbow.  Negative Phalen's today.  She has ulnar motor weakness with finger abduction and flexion of the small DIP joint.  No median nerve findings today.  Good capillary refill.  GAIT: not tested             Diagnostic Modalities:  left upper extremity EMG/NCV mild left carpal tunnel with chronic neurogenic changes in the ulnar nerve.  No acute compressions.  Independent visualization of the images was performed.      Impression: left cubital tunnel syndrome     Plan:  All of the above pertinent physical exam and imaging modalities findings was reviewed with Lani.    Options discussed.  Unfortunately she only received short-term improvement after the ulnar nerve decompression at the elbow.  She has both motor and sensory changes affecting the quality of her life.  We therefore discussed proceeding with left ulnar nerve decompression at the elbow with transposition.  I discussed the limitations of the procedure.  She may not have complete resolution of numbness and tingling.  May take upwards of a year for recovery.  She understands limitations.  Once reviewed she is opted to proceed with surgery.    The risks, benefits, alternatives, complication, limitations and expectations were reviewed at length. " Complications such as infection, bleeding, nerve damage-loss of use of hand, incomplete release, numbness around the incision and into the forearm, dislocating nerve, repeat surgery, skin problems, scar sensitivity were discussed.     Also reviewed were expectations and the goal of surgery. With surgery the goal would be to prevent further damage to the nerve, the surgery may not be able to reverse any current nerve damage. Postoperatively there will be limitation in use for approximately 4 months. All questions were answered. The patient agrees to proceed with surgery.     The patient's past medical and surgical history was reviewed; The patient will need a preoperative medical evaluation and clearance. Anticipate performing the surgery under general anesthesia.     Return to clinic 10 days post-operatively. , or sooner as needed for changes.  Re-x-ray on return: Dayana Parks D.O.          Again, thank you for allowing me to participate in the care of your patient.        Sincerely,        Steve Parks, DO

## 2018-06-07 NOTE — PROGRESS NOTES
"Office Visit-Follow up    Chief Complaint: Lani Barry is a 63 year old female who is being seen for   Chief Complaint   Patient presents with     RECHECK     emg results       History of Present Illness:   Today's visit:  Returns to discuss her left arm.  She reports cramping a few times a day with any type of fine motor skills to the small and ring finger.  Extremely painful.  She is dropped objects including a class.  She has near constant numbness and tingling.  May 18, 2018 visit:  Returns to clinic.  Last seen in Sept. 2016 at 2 week post-op visit from left ulnar nerve decompression. She states after surgery the numbness/tinglnig, pins and needles to her left pinky and ring finger did get better, did not completely resolved but got better, then about 6 months after surgery started to get worse, has been progressively getting worse, now to the point that the symptoms are worse than prior to surgery. No symptoms in thumb, index, middle finger.  She states numbness/tingling nearly constant in ring and pinky finger, occasional shooting pins and needles from fingers to elbow.  Also feels like loosing  strength.  Fine motor tasks, griping with the pinky and ring finger worsen the symptoms.  Tried massaging the hand and elbow which helps some. No other therapies tried.      REVIEW OF SYSTEMS  General: negative for, night sweats, dizziness, fatigue  Resp: No shortness of breath and no cough  CV: negative for chest pain, syncope or near-syncope  GI: negative for nausea, vomiting and diarrhea  : negative for dysuria and hematuria  Musculoskeletal: as above  Neurologic: negative for syncope   Hematologic: negative for bleeding disorder    Physical Exam:  Vitals: /70 (BP Location: Right arm, Patient Position: Chair, Cuff Size: Adult Large)  Temp 98  F (36.7  C) (Temporal)  Ht 5' 0.75\" (1.543 m)  Wt 204 lb (92.5 kg)  BMI 38.86 kg/m2  BMI= Body mass index is 38.86 kg/(m^2).  Constitutional: healthy, alert " and no acute distress   Psychiatric: mentation appears normal and affect normal/bright  NEURO: no focal deficits  RESP: Normal with easy respirations and no use of accessory muscles to breathe, no audible wheezing or retractions  CV: LUE:   no edema         Regular rate and rhythm by palpation  SKIN: No erythema, rashes, excoriation, or breakdown. No evidence of infection.   JOINT/EXTREMITIES:left double/hand: Full range of motion.  There is no atrophy.  Positive Tinel's at the elbow.  Negative Phalen's today.  She has ulnar motor weakness with finger abduction and flexion of the small DIP joint.  No median nerve findings today.  Good capillary refill.  GAIT: not tested             Diagnostic Modalities:  left upper extremity EMG/NCV mild left carpal tunnel with chronic neurogenic changes in the ulnar nerve.  No acute compressions.  Independent visualization of the images was performed.      Impression: left cubital tunnel syndrome     Plan:  All of the above pertinent physical exam and imaging modalities findings was reviewed with Lani.    Options discussed.  Unfortunately she only received short-term improvement after the ulnar nerve decompression at the elbow.  She has both motor and sensory changes affecting the quality of her life.  We therefore discussed proceeding with left ulnar nerve decompression at the elbow with transposition.  I discussed the limitations of the procedure.  She may not have complete resolution of numbness and tingling.  May take upwards of a year for recovery.  She understands limitations.  Once reviewed she is opted to proceed with surgery.    The risks, benefits, alternatives, complication, limitations and expectations were reviewed at length. Complications such as infection, bleeding, nerve damage-loss of use of hand, incomplete release, numbness around the incision and into the forearm, dislocating nerve, repeat surgery, skin problems, scar sensitivity were discussed.     Also  reviewed were expectations and the goal of surgery. With surgery the goal would be to prevent further damage to the nerve, the surgery may not be able to reverse any current nerve damage. Postoperatively there will be limitation in use for approximately 4 months. All questions were answered. The patient agrees to proceed with surgery.     The patient's past medical and surgical history was reviewed; The patient will need a preoperative medical evaluation and clearance. Anticipate performing the surgery under general anesthesia.     Return to clinic 10 days post-operatively. , or sooner as needed for changes.  Re-x-ray on return: Dayana Parks D.O.

## 2018-06-07 NOTE — MR AVS SNAPSHOT
"              After Visit Summary   6/7/2018    Lani Barry    MRN: [id number]           Patient Information     Date Of Birth          1955        Visit Information        Provider Department      6/7/2018 9:50 AM Steve Parks,  [facility]         Follow-ups after your visit        Your next 10 appointments already scheduled     Jul 12, 2018 10:00 AM CDT   New Visit with Luigi Wong MD   [facility] ([facility])    [address]  [place] [address] [phone]              Who to contact     If you have questions or need follow up information about today's clinic visit or your schedule please contact [facility] directly at [phone].  Normal or non-critical lab and imaging results will be communicated to you by MyChart, letter or phone within 4 business days after the clinic has received the results. If you do not hear from us within 7 days, please contact the clinic through MyChart or phone. If you have a critical or abnormal lab result, we will notify you by phone as soon as possible.  Submit refill requests through [facility] or call your pharmacy and they will forward the refill request to us. Please allow 3 business days for your refill to be completed.          Additional Information About Your Visit        MyChart Information     [facility] lets you send messages to your doctor, view your test results, renew your prescriptions, schedule appointments and more. To sign up, go to [email or web address] . Click on \"Log in\" on the left side of the screen, which will take you to the Welcome page. Then click on \"Sign up Now\" on the right side of the page.     You will be asked to enter the access code listed below, as well as some personal information. Please follow the directions to create your username and password.     Your access code is: [email or web address]  Expires: 8/16/2018  2:02 PM     Your access " "code will  in 90 days. If you need help or a new code, please call your [facility] clinic or [phone].        Care EveryWhere ID     This is your Care EveryWhere ID. This could be used by other organizations to access your [facility] medical records  [id number]        Your Vitals Were     Temperature Height BMI (Body Mass Index)             98  F (36.7  C) (Temporal) 1.543 m (5' 0.75\") 38.86 kg/m2          Blood Pressure from Last 3 Encounters:   18 120/70   18 110/70   10/25/17 102/62    Weight from Last 3 Encounters:   18 92.5 kg (204 lb)   18 92.8 kg (204 lb 9.6 oz)   10/25/17 93 kg (205 lb)              Today, you had the following     No orders found for display       Primary Care Provider Office Phone # Fax #    Cipriano Miguel Arias,  [phone]       [address] [place] [address]        Equal Access to Services     [facility]: Hadii twila weaver hadasho Soomaali, waaxda luqadaha, qaybta kaalmada adeegyanato, tona de la torre . So [facility] [phone].    ATENCIÓN: Si habla español, tiene a reaves disposición servicios gratuitos de asistencia lingüística. Llame al [phone].    We comply with applicable federal civil rights laws and [place] laws. We do not discriminate on the basis of race, color, national origin, age, disability, sex, sexual orientation, or gender identity.            Thank you!     Thank you for choosing [facility]  for your care. Our goal is always to provide you with excellent care. Hearing back from our patients is one way we can continue to improve our services. Please take a few minutes to complete the written survey that you may receive in the mail after your visit with us. Thank you!             Your Updated Medication List - Protect others around you: Learn how to safely use, store and throw away your medicines at www.disposemymeds.org.          This list is accurate as of 18  9:59 AM. "  Always use your most recent med list.                   Brand Name Dispense Instructions for use Diagnosis    ASPIRIN PO      Take 81 mg by mouth        DIPHENHYDRAMINE HCL PO      Take 50 mg by mouth every 6 hours as needed        fluocinolone 0.01 % solution    SYNALAR    60 mL    APPLY TOPICALLY TWICE A DAY    Intrinsic eczema       varenicline 1 MG tablet    CHANTIX     Take 1 tablet (1 mg) by mouth 2 times daily

## 2018-06-13 ENCOUNTER — OFFICE VISIT (OUTPATIENT)
Dept: FAMILY MEDICINE | Facility: OTHER | Age: 63
End: 2018-06-13
Payer: MEDICARE

## 2018-06-13 VITALS
OXYGEN SATURATION: 96 % | WEIGHT: 200.4 LBS | DIASTOLIC BLOOD PRESSURE: 80 MMHG | BODY MASS INDEX: 38.18 KG/M2 | SYSTOLIC BLOOD PRESSURE: 120 MMHG | HEART RATE: 96 BPM

## 2018-06-13 DIAGNOSIS — Z72.0 TOBACCO ABUSE: ICD-10-CM

## 2018-06-13 DIAGNOSIS — E78.5 HYPERLIPIDEMIA LDL GOAL <130: ICD-10-CM

## 2018-06-13 DIAGNOSIS — R20.2 NUMBNESS AND TINGLING IN LEFT HAND: ICD-10-CM

## 2018-06-13 DIAGNOSIS — G56.22 CUBITAL TUNNEL SYNDROME, LEFT: ICD-10-CM

## 2018-06-13 DIAGNOSIS — R20.0 NUMBNESS AND TINGLING IN LEFT HAND: ICD-10-CM

## 2018-06-13 DIAGNOSIS — Z01.818 PREOP GENERAL PHYSICAL EXAM: Primary | ICD-10-CM

## 2018-06-13 LAB
ALBUMIN SERPL-MCNC: 3.8 G/DL (ref 3.4–5)
ALBUMIN UR-MCNC: NEGATIVE MG/DL
ALP SERPL-CCNC: 75 U/L (ref 40–150)
ALT SERPL W P-5'-P-CCNC: 31 U/L (ref 0–50)
APPEARANCE UR: CLEAR
AST SERPL W P-5'-P-CCNC: 17 U/L (ref 0–45)
BILIRUB DIRECT SERPL-MCNC: 0.1 MG/DL (ref 0–0.2)
BILIRUB SERPL-MCNC: 0.3 MG/DL (ref 0.2–1.3)
BILIRUB UR QL STRIP: NEGATIVE
COLOR UR AUTO: YELLOW
ERYTHROCYTE [DISTWIDTH] IN BLOOD BY AUTOMATED COUNT: 12.8 % (ref 10–15)
GLUCOSE UR STRIP-MCNC: NEGATIVE MG/DL
HCT VFR BLD AUTO: 42.7 % (ref 35–47)
HGB BLD-MCNC: 13.9 G/DL (ref 11.7–15.7)
HGB UR QL STRIP: NEGATIVE
KETONES UR STRIP-MCNC: NEGATIVE MG/DL
LDLC SERPL DIRECT ASSAY-MCNC: 107 MG/DL
LEUKOCYTE ESTERASE UR QL STRIP: NEGATIVE
MCH RBC QN AUTO: 31 PG (ref 26.5–33)
MCHC RBC AUTO-ENTMCNC: 32.6 G/DL (ref 31.5–36.5)
MCV RBC AUTO: 95 FL (ref 78–100)
NITRATE UR QL: NEGATIVE
PH UR STRIP: 5.5 PH (ref 5–7)
PLATELET # BLD AUTO: 282 10E9/L (ref 150–450)
PROT SERPL-MCNC: 7.1 G/DL (ref 6.8–8.8)
RBC # BLD AUTO: 4.48 10E12/L (ref 3.8–5.2)
SOURCE: NORMAL
SP GR UR STRIP: 1.02 (ref 1–1.03)
UROBILINOGEN UR STRIP-ACNC: 0.2 EU/DL (ref 0.2–1)
WBC # BLD AUTO: 7 10E9/L (ref 4–11)

## 2018-06-13 PROCEDURE — 36415 COLL VENOUS BLD VENIPUNCTURE: CPT | Performed by: INTERNAL MEDICINE

## 2018-06-13 PROCEDURE — 83721 ASSAY OF BLOOD LIPOPROTEIN: CPT | Performed by: INTERNAL MEDICINE

## 2018-06-13 PROCEDURE — 85027 COMPLETE CBC AUTOMATED: CPT | Performed by: INTERNAL MEDICINE

## 2018-06-13 PROCEDURE — 99214 OFFICE O/P EST MOD 30 MIN: CPT | Performed by: INTERNAL MEDICINE

## 2018-06-13 PROCEDURE — 81003 URINALYSIS AUTO W/O SCOPE: CPT | Performed by: INTERNAL MEDICINE

## 2018-06-13 PROCEDURE — 80076 HEPATIC FUNCTION PANEL: CPT | Performed by: INTERNAL MEDICINE

## 2018-06-13 RX ORDER — VARENICLINE TARTRATE 1 MG/1
1 TABLET, FILM COATED ORAL 2 TIMES DAILY
Qty: 60 TABLET | Refills: 1 | Status: SHIPPED | OUTPATIENT
Start: 2018-06-13 | End: 2019-03-08

## 2018-06-13 ASSESSMENT — PAIN SCALES - GENERAL: PAINLEVEL: NO PAIN (0)

## 2018-06-13 NOTE — MR AVS SNAPSHOT
After Visit Summary   6/13/2018    Lani Barry    MRN: [id number]           Patient Information     Date Of Birth          1955        Visit Information        Provider Department      6/13/2018 1:00 PM Cipriano Arias DO [facility]        Today's Diagnoses     Preop general physical exam    -  1    Cubital tunnel syndrome, left        Numbness and tingling in left hand        Hyperlipidemia LDL goal <130        Tobacco abuse          Care Instructions     Patient Instructions:  ++++++++++++++++++++++++++++++++++++++++++++   I have asked the patient to :      Do not take aspirin for 5 days prior to the procedure.            Juana before Your Surgery      Call your surgeon if there is any change in your health. This includes signs of a cold or flu (such as a sore throat, runny nose, cough, rash or fever).    Do not smoke, drink alcohol or take over the counter medicine (unless your surgeon or primary care doctor tells you to) for the 24 hours before and after surgery.    If you take prescribed drugs: Follow your doctor s orders about which medicines to take and which to stop until after surgery.    Eating and drinking prior to surgery: follow the instructions from your surgeon    Take a shower or bath the night before surgery. Use the soap your surgeon gave you to gently clean your skin. If you do not have soap from your surgeon, use your regular soap. Do not shave or scrub the surgery site.  Wear clean pajamas and have clean sheets on your bed.           Follow-ups after your visit        Your next 10 appointments already scheduled     Jun 19, 2018   Procedure with Steve Parks DO   [facility] ([facility])    [address] [place] [address]   [phone]           From [address]: Exit at [address] on [place] side of [place]. Turn right on [address]. Turn left at stoplight on [address].  "[facility] will be in view two blocks ahead            Jun 29, 2018  1:20 PM CDT   [facility] DO   [facility]    [address] [address] [phone]            Jul 12, 2018 10:00 AM CDT   New Visit with Luigi Wong MD   [facility])    [address]  [place] [id number] [phone]              Future tests that were ordered for you today     Open Future Orders        Priority Expected Expires Ordered    **Basic metabolic panel FUTURE 14d Routine 6/20/2018 6/27/2018 6/13/2018            Who to contact     If you have questions or need follow up information about today's clinic visit or your schedule please contact [facility] directly at [phone].  Normal or non-critical lab and imaging results will be communicated to you by MyChart, letter or phone within 4 business days after the clinic has received the results. If you do not hear from us within 7 days, please contact the clinic through [employer] or phone. If you have a critical or abnormal lab result, we will notify you by phone as soon as possible.  Submit refill requests through [employer] or call your pharmacy and they will forward the refill request to us. Please allow 3 business days for your refill to be completed.          Additional Information About Your Visit        [employer] Information     AnyMeeting lets you send messages to your doctor, view your test results, renew your prescriptions, schedule appointments and more. To sign up, go to [email or web address] . Click on \"Log in\" on the left side of the screen, which will take you to the Welcome page. Then click on \"Sign up Now\" on the right side of the page.     You will be asked to enter the access code listed below, as well as some personal information. Please follow the directions to create your username and " password.     Your access code is: [email or web address]  Expires: 2018  2:02 PM     Your access code will  in 90 days. If you need help or a new code, please call your [place] clinic or [phone].        Care EveryWhere ID     This is your Care EveryWhere ID. This could be used by other organizations to access your [place] medical records  [id number]        Your Vitals Were     Pulse Pulse Oximetry BMI (Body Mass Index)             96 96% 38.18 kg/m2          Blood Pressure from Last 3 Encounters:   18 120/80   18 120/70   18 110/70    Weight from Last 3 Encounters:   18 200 lb 6.4 oz (90.9 kg)   18 204 lb (92.5 kg)   18 204 lb 9.6 oz (92.8 kg)              We Performed the Following     *UA reflex to Microscopic and Culture ([place] [facility] (except [place] and [place])     CBC with platelets     Hepatic panel     LDL cholesterol direct          Where to get your medicines      These medications were sent to [facility] [address] [address]     Phone:  [phone]     varenicline 1 MG tablet          Primary Care Provider Office Phone # Fax #    [facility] Miguel Fishermonika,  [phone]       [address] [place] [address]        Equal Access to Services     ELKIN PELAEZ AH: Hadii twila fitzpatricko Sojose, waaxda luqadaha, qaybta kaalmada cecy, tona acosta. So [facility] [phone].    ATENCIÓN: Si habla español, tiene a reaves disposición servicios gratuitos de asistencia lingüística. Llame al [phone].    We comply with applicable federal civil rights laws and [place] laws. We do not discriminate on the basis of race, color, national origin, age, disability, sex, sexual orientation, or gender identity.            Thank you!     Thank you for choosing [facility]  for your care. Our goal is always to provide you  with excellent care. Hearing back from our patients is one way we can continue to improve our services. Please take a few minutes to complete the written survey that you may receive in the mail after your visit with us. Thank you!             Your Updated Medication List - Protect others around you: Learn how to safely use, store and throw away your medicines at www.disposemymeds.org.          This list is accurate as of 6/13/18  1:30 PM.  Always use your most recent med list.                   Brand Name Dispense Instructions for use Diagnosis    ASPIRIN PO      Take 81 mg by mouth        DIPHENHYDRAMINE HCL PO      Take 50 mg by mouth every 6 hours as needed        fluocinolone 0.01 % solution    SYNALAR    60 mL    APPLY TOPICALLY TWICE A DAY    Intrinsic eczema       varenicline 1 MG tablet    CHANTIX    60 tablet    Take 1 tablet (1 mg) by mouth 2 times daily    Tobacco abuse

## 2018-06-13 NOTE — PROGRESS NOTES
[facility]  [address] [facility] [address] [facility] [address]  [phone]  Dept: [phone]    PRE-OP EVALUATION:  Today's date: 2018    Lani Barry (: 1955) presents for pre-operative evaluation assessment as requested by Dr. Parks.  She requires evaluation and anesthesia risk assessment prior to undergoing surgery/procedure for treatment of Left ulnar nerve decompression with transposition .    Fax number for surgical facility:   Primary Physician: Cipriano Arias  Type of Anesthesia Anticipated: General    Patient has a Health Care Directive or Living Will:  YES     Preop Questions 2018   Who is doing your surgery? dr Parks   What are you having done? arm tendon reassignment   Date of Surgery/Procedure:    Facility or Hospital where procedure/surgery will be performed: [facility]   1.  Do you have a history of Heart attack, stroke, stent, coronary bypass surgery, or other heart surgery? No   2.  Do you ever have any pain or discomfort in your chest? No   3.  Do you have a history of  Heart Failure? No   4.   Are you troubled by shortness of breath when:  walking on a level surface, or up a slight hill, or at night? No   5.  Do you currently have a cold, bronchitis or other respiratory infection? No   6.  Do you have a cough, shortness of breath, or wheezing? No   7.  Do you sometimes get pains in the calves of your legs when you walk? YES -    8. Do you or anyone in your family have previous history of blood clots? No   9.  Do you or does anyone in your family have a serious bleeding problem such as prolonged bleeding following surgeries or cuts? No   10. Have you ever had problems with anemia or been told to take iron pills? No   11. Have you had any abnormal blood loss such as black, tarry or bloody stools, or abnormal vaginal bleeding? No   12. Have you ever had a blood transfusion? No   13. Have you or any of your relatives ever had problems with  "anesthesia? YES -    14. Do you have sleep apnea, excessive snoring or daytime drowsiness? No   15. Do you have any prosthetic heart valves? No   16. Do you have prosthetic joints? YES -    17. Is there any chance that you may be pregnant? No         HPI:     HPI related to upcoming procedure: Patient has progressive left-sided ulnar neuropathy and has previously failed \"stretching procedure\" and is now requiring ulnar transposition surgery.  She has increased pain, tingling, and paresthesias in the ulnar distribution of her left hand which is no longer relieved silhouette straightening her elbow      See problem list for active medical problems.  Problems all longstanding and stable, except as noted/documented.  See ROS for pertinent symptoms related to these conditions.                                                                                                                                                          .    MEDICAL HISTORY:     Patient Active Problem List    Diagnosis Date Noted     S/P cubital tunnel release 05/18/2018     Priority: Medium     8/30/2016 left side by Dr. Parks       Numbness and tingling in left hand 05/18/2018     Priority: Medium     Advanced directives, counseling/discussion 10/25/2017     Priority: Medium     Will bring in a copy       Morbid obesity (H) 10/25/2017     Priority: Medium     Cubital tunnel syndrome, left 08/18/2016     Priority: Medium     Lesion of left ulnar nerve 08/16/2016     Priority: Medium     Gastroesophageal reflux disease without esophagitis 08/16/2016     Priority: Medium     Hyperlipidemia LDL goal <130 08/16/2016     Priority: Medium      Past Medical History:   Diagnosis Date     Arthritis      Cerebral infarction (H)      Morbid obesity (H) 10/25/2017     Past Surgical History:   Procedure Laterality Date     INJECT FACET JOINT N/A 7/10/2017    Procedure: INJECT FACET JOINT;  Lumbar 3-4 Bilateral Facet joint injection;  Surgeon: Janette Brady" Misha MILES MD;  Location: [facility] OR     RELEASE ULNAR NERVE (ELBOW) Left 8/30/2016    Procedure: RELEASE ULNAR NERVE (ELBOW);  Surgeon: Steve Parks DO;  Location: [facility] OR     Current Outpatient Prescriptions   Medication Sig Dispense Refill     ASPIRIN PO Take 81 mg by mouth       DIPHENHYDRAMINE HCL PO Take 50 mg by mouth every 6 hours as needed       fluocinolone (SYNALAR) 0.01 % solution APPLY TOPICALLY TWICE A DAY 60 mL 3     varenicline (CHANTIX) 1 MG tablet Take 1 tablet (1 mg) by mouth 2 times daily       OTC products: no recent use of OTC ASA, NSAIDS or Steroids    Allergies   Allergen Reactions     Bees       Latex Allergy: NO    Social History   Substance Use Topics     Smoking status: Current Some Day Smoker     Packs/day: 0.10     Types: Cigars     Smokeless tobacco: Never Used     Alcohol use No     History   Drug Use No       REVIEW OF SYSTEMS:   CONSTITUTIONAL: NEGATIVE for fever, chills, change in weight  INTEGUMENTARY/SKIN: NEGATIVE for worrisome rashes, moles or lesions  EYES: NEGATIVE for vision changes or irritation  ENT/MOUTH: NEGATIVE for ear, mouth and throat problems  RESP: NEGATIVE for significant cough or SOB  CV: NEGATIVE for chest pain, palpitations or peripheral edema  GI: NEGATIVE for nausea, abdominal pain, heartburn, or change in bowel habits  : NEGATIVE for frequency, dysuria, or hematuria  MUSCULOSKELETAL: NEGATIVE for significant arthralgias or myalgia  NEURO: POSITIVE for paresthesias left ulnar distribution  ENDOCRINE: NEGATIVE for temperature intolerance, skin/hair changes  HEME: NEGATIVE for bleeding problems  PSYCHIATRIC: NEGATIVE for changes in mood or affect    EXAM:   There were no vitals taken for this visit.    GENERAL APPEARANCE: healthy, alert and no distress     EYES: EOMI, PERRL     HENT: ear canals and TM's normal and nose and mouth without ulcers or lesions     NECK: no adenopathy, no asymmetry, masses, or scars and thyroid normal to palpation     RESP:  lungs clear to auscultation - no rales, rhonchi or wheezes     CV: regular rates and rhythm, normal S1 S2, no S3 or S4 and no murmur, click or rub     ABDOMEN:  soft, nontender, no HSM or masses and bowel sounds normal     MS: extremities normal- no gross deformities noted, no evidence of inflammation in joints, FROM in all extremities.     SKIN: no suspicious lesions or rashes     NEURO: mentation intact, speech normal and paresthesias in the ulnar distribution of left hand are noted.  No weakness present.     PSYCH: mentation appears normal. and affect normal/bright     LYMPHATICS: No cervical adenopathy    DIAGNOSTICS:   Lab work is pending and will be reviewed prior to surgery        IMPRESSION:   Reason for surgery/procedure: Cubital syndrome involving the left hand and arm  Diagnosis/reason for consult: Perioperative risk assessment in a pleasant 63-year-old female with:  1. Preop general physical exam  - CBC with platelets  - **Basic metabolic panel FUTURE 14d; Future  - *UA reflex to Microscopic and Culture ([facility] (except [place] and [place])    2. Cubital tunnel syndrome, left    3. Numbness and tingling in left hand    4. Hyperlipidemia LDL goal <130  - LDL cholesterol direct  - Hepatic panel    5. Tobacco abuse  - varenicline (CHANTIX) 1 MG tablet; Take 1 tablet (1 mg) by mouth 2 times daily  Dispense: 60 tablet; Refill: 1      The proposed surgical procedure is considered LOW risk.    REVISED CARDIAC RISK INDEX  The patient has the following serious cardiovascular risks for perioperative complications such as (MI, PE, VFib and 3  AV Block):  No serious cardiac risks  INTERPRETATION: 1 risks: Class II (low risk - 0.9% complication rate)    The patient has the following additional risks for perioperative complications:  No identified additional risks      ICD-10-CM    1. Preop general physical exam Z01.818        RECOMMENDATIONS:         --Patient is to take all scheduled  medications on the day of surgery EXCEPT for modifications listed below.  Patient is specifically instructed to discontinue aspirin 5 days prior to the procedure.        APPROVAL GIVEN to proceed with proposed procedure, without further diagnostic evaluation       Signed Electronically by: Cipriano Arias DO    Copy of this evaluation report is provided to requesting physician.    [place] Preop Guidelines    Revised Cardiac Risk Index

## 2018-06-13 NOTE — PATIENT INSTRUCTIONS
Patient Instructions:  ++++++++++++++++++++++++++++++++++++++++++++   I have asked the patient to :      Do not take aspirin for 5 days prior to the procedure.            [place] before Your Surgery      Call your surgeon if there is any change in your health. This includes signs of a cold or flu (such as a sore throat, runny nose, cough, rash or fever).    Do not smoke, drink alcohol or take over the counter medicine (unless your surgeon or primary care doctor tells you to) for the 24 hours before and after surgery.    If you take prescribed drugs: Follow your doctor s orders about which medicines to take and which to stop until after surgery.    Eating and drinking prior to surgery: follow the instructions from your surgeon    Take a shower or bath the night before surgery. Use the soap your surgeon gave you to gently clean your skin. If you do not have soap from your surgeon, use your regular soap. Do not shave or scrub the surgery site.  Wear clean pajamas and have clean sheets on your bed.

## 2018-06-13 NOTE — LETTER
June 19, 2018      Lani Barry  [address] [facility] [address]        Dear ,    We are writing to inform you of your test results.    The urine sample is negative.   The cholesterol is minimally elevated with an LDL of 107.   Liver tests are normal.   Blood cell count is normal without evidence of anemia or leukemia.     Resulted Orders   CBC with platelets   Result Value Ref Range    WBC 7.0 4.0 - 11.0 10e9/L    RBC Count 4.48 3.8 - 5.2 10e12/L    Hemoglobin 13.9 11.7 - 15.7 g/dL    Hematocrit 42.7 35.0 - 47.0 %    MCV 95 78 - 100 fl    MCH 31.0 26.5 - 33.0 pg    MCHC 32.6 31.5 - 36.5 g/dL    RDW 12.8 10.0 - 15.0 %    Platelet Count 282 150 - 450 10e9/L   *UA reflex to Microscopic and Culture ([facility] (except [place] and [place])   Result Value Ref Range    Color Urine Yellow     Appearance Urine Clear     Glucose Urine Negative NEG^Negative mg/dL    Bilirubin Urine Negative NEG^Negative    Ketones Urine Negative NEG^Negative mg/dL    Specific Gravity Urine 1.025 1.003 - 1.035    Blood Urine Negative NEG^Negative    pH Urine 5.5 5.0 - 7.0 pH    Protein Albumin Urine Negative NEG^Negative mg/dL    Urobilinogen Urine 0.2 0.2 - 1.0 EU/dL    Nitrite Urine Negative NEG^Negative    Leukocyte Esterase Urine Negative NEG^Negative    Source Unspecified Urine    LDL cholesterol direct   Result Value Ref Range    LDL Cholesterol Direct 107 (H) <100 mg/dL      Comment:      Above desirable:  100-129 mg/dl  Borderline High:  130-159 mg/dL  High:             160-189 mg/dL  Very high:       >189 mg/dl     Hepatic panel   Result Value Ref Range    Bilirubin Direct 0.1 0.0 - 0.2 mg/dL    Bilirubin Total 0.3 0.2 - 1.3 mg/dL    Albumin 3.8 3.4 - 5.0 g/dL    Protein Total 7.1 6.8 - 8.8 g/dL    Alkaline Phosphatase 75 40 - 150 U/L    ALT 31 0 - 50 U/L    AST 17 0 - 45 U/L       If you have any questions or concerns, please call the clinic at the number listed above.       Sincerely,        Cipriano  Miguel Arias, DO

## 2018-06-18 ENCOUNTER — ANESTHESIA EVENT (OUTPATIENT)
Dept: SURGERY | Facility: CLINIC | Age: 63
End: 2018-06-18
Payer: MEDICARE

## 2018-06-18 ASSESSMENT — LIFESTYLE VARIABLES: TOBACCO_USE: 1

## 2018-06-18 NOTE — H&P (VIEW-ONLY)
[facility]  [address] [facility] [address]  [phone]  Dept: [phone]    PRE-OP EVALUATION:  Today's date: 2018    Lani Barry (: 1955) presents for pre-operative evaluation assessment as requested by Dr. Parks.  She requires evaluation and anesthesia risk assessment prior to undergoing surgery/procedure for treatment of Left ulnar nerve decompression with transposition .    Fax number for surgical facility:   Primary Physician: Cipriano Arias  Type of Anesthesia Anticipated: General    Patient has a Health Care Directive or Living Will:  YES     Preop Questions 2018   Who is doing your surgery? dr Parks   What are you having done? arm tendon reassignment   Date of Surgery/Procedure:    Facility or Hospital where procedure/surgery will be performed: [facility]   1.  Do you have a history of Heart attack, stroke, stent, coronary bypass surgery, or other heart surgery? No   2.  Do you ever have any pain or discomfort in your chest? No   3.  Do you have a history of  Heart Failure? No   4.   Are you troubled by shortness of breath when:  walking on a level surface, or up a slight hill, or at night? No   5.  Do you currently have a cold, bronchitis or other respiratory infection? No   6.  Do you have a cough, shortness of breath, or wheezing? No   7.  Do you sometimes get pains in the calves of your legs when you walk? YES -    8. Do you or anyone in your family have previous history of blood clots? No   9.  Do you or does anyone in your family have a serious bleeding problem such as prolonged bleeding following surgeries or cuts? No   10. Have you ever had problems with anemia or been told to take iron pills? No   11. Have you had any abnormal blood loss such as black, tarry or bloody stools, or abnormal vaginal bleeding? No   12. Have you ever had a blood transfusion? No   13. Have you or any of your relatives ever had problems with  "anesthesia? YES -    14. Do you have sleep apnea, excessive snoring or daytime drowsiness? No   15. Do you have any prosthetic heart valves? No   16. Do you have prosthetic joints? YES -    17. Is there any chance that you may be pregnant? No         HPI:     HPI related to upcoming procedure: Patient has progressive left-sided ulnar neuropathy and has previously failed \"stretching procedure\" and is now requiring ulnar transposition surgery.  She has increased pain, tingling, and paresthesias in the ulnar distribution of her left hand which is no longer relieved silhouette straightening her elbow      See problem list for active medical problems.  Problems all longstanding and stable, except as noted/documented.  See ROS for pertinent symptoms related to these conditions.                                                                                                                                                          .    MEDICAL HISTORY:     Patient Active Problem List    Diagnosis Date Noted     S/P cubital tunnel release 05/18/2018     Priority: Medium     8/30/2016 left side by Dr. Parks       Numbness and tingling in left hand 05/18/2018     Priority: Medium     Advanced directives, counseling/discussion 10/25/2017     Priority: Medium     Will bring in a copy       Morbid obesity (H) 10/25/2017     Priority: Medium     Cubital tunnel syndrome, left 08/18/2016     Priority: Medium     Lesion of left ulnar nerve 08/16/2016     Priority: Medium     Gastroesophageal reflux disease without esophagitis 08/16/2016     Priority: Medium     Hyperlipidemia LDL goal <130 08/16/2016     Priority: Medium      Past Medical History:   Diagnosis Date     Arthritis      Cerebral infarction (H)      Morbid obesity (H) 10/25/2017     Past Surgical History:   Procedure Laterality Date     INJECT FACET JOINT N/A 7/10/2017    Procedure: INJECT FACET JOINT;  Lumbar 3-4 Bilateral Facet joint injection;  Surgeon: Janette Brady" Misha MILES MD;  Location: [facility] OR     RELEASE ULNAR NERVE (ELBOW) Left 8/30/2016    Procedure: RELEASE ULNAR NERVE (ELBOW);  Surgeon: Steve Parks DO;  Location: [facility] OR     Current Outpatient Prescriptions   Medication Sig Dispense Refill     ASPIRIN PO Take 81 mg by mouth       DIPHENHYDRAMINE HCL PO Take 50 mg by mouth every 6 hours as needed       fluocinolone (SYNALAR) 0.01 % solution APPLY TOPICALLY TWICE A DAY 60 mL 3     varenicline (CHANTIX) 1 MG tablet Take 1 tablet (1 mg) by mouth 2 times daily       OTC products: no recent use of OTC ASA, NSAIDS or Steroids    Allergies   Allergen Reactions     Bees       Latex Allergy: NO    Social History   Substance Use Topics     Smoking status: Current Some Day Smoker     Packs/day: 0.10     Types: Cigars     Smokeless tobacco: Never Used     Alcohol use No     History   Drug Use No       REVIEW OF SYSTEMS:   CONSTITUTIONAL: NEGATIVE for fever, chills, change in weight  INTEGUMENTARY/SKIN: NEGATIVE for worrisome rashes, moles or lesions  EYES: NEGATIVE for vision changes or irritation  ENT/MOUTH: NEGATIVE for ear, mouth and throat problems  RESP: NEGATIVE for significant cough or SOB  CV: NEGATIVE for chest pain, palpitations or peripheral edema  GI: NEGATIVE for nausea, abdominal pain, heartburn, or change in bowel habits  : NEGATIVE for frequency, dysuria, or hematuria  MUSCULOSKELETAL: NEGATIVE for significant arthralgias or myalgia  NEURO: POSITIVE for paresthesias left ulnar distribution  ENDOCRINE: NEGATIVE for temperature intolerance, skin/hair changes  HEME: NEGATIVE for bleeding problems  PSYCHIATRIC: NEGATIVE for changes in mood or affect    EXAM:   There were no vitals taken for this visit.    GENERAL APPEARANCE: healthy, alert and no distress     EYES: EOMI, PERRL     HENT: ear canals and TM's normal and nose and mouth without ulcers or lesions     NECK: no adenopathy, no asymmetry, masses, or scars and thyroid normal to palpation     RESP:  lungs clear to auscultation - no rales, rhonchi or wheezes     CV: regular rates and rhythm, normal S1 S2, no S3 or S4 and no murmur, click or rub     ABDOMEN:  soft, nontender, no HSM or masses and bowel sounds normal     MS: extremities normal- no gross deformities noted, no evidence of inflammation in joints, FROM in all extremities.     SKIN: no suspicious lesions or rashes     NEURO: mentation intact, speech normal and paresthesias in the ulnar distribution of left hand are noted.  No weakness present.     PSYCH: mentation appears normal. and affect normal/bright     LYMPHATICS: No cervical adenopathy    DIAGNOSTICS:   Lab work is pending and will be reviewed prior to surgery        IMPRESSION:   Reason for surgery/procedure: Cubital syndrome involving the left hand and arm  Diagnosis/reason for consult: Perioperative risk assessment in a pleasant 63-year-old female with:  1. Preop general physical exam  - CBC with platelets  - **Basic metabolic panel FUTURE 14d; Future  - *UA reflex to Microscopic and Culture ([facility] (except [place] and [place])    2. Cubital tunnel syndrome, left    3. Numbness and tingling in left hand    4. Hyperlipidemia LDL goal <130  - LDL cholesterol direct  - Hepatic panel    5. Tobacco abuse  - varenicline (CHANTIX) 1 MG tablet; Take 1 tablet (1 mg) by mouth 2 times daily  Dispense: 60 tablet; Refill: 1      The proposed surgical procedure is considered LOW risk.    REVISED CARDIAC RISK INDEX  The patient has the following serious cardiovascular risks for perioperative complications such as (MI, PE, VFib and 3  AV Block):  No serious cardiac risks  INTERPRETATION: 1 risks: Class II (low risk - 0.9% complication rate)    The patient has the following additional risks for perioperative complications:  No identified additional risks      ICD-10-CM    1. Preop general physical exam Z01.818        RECOMMENDATIONS:         --Patient is to take all scheduled  medications on the day of surgery EXCEPT for modifications listed below.  Patient is specifically instructed to discontinue aspirin 5 days prior to the procedure.        APPROVAL GIVEN to proceed with proposed procedure, without further diagnostic evaluation       Signed Electronically by: Cipriano Arias DO    Copy of this evaluation report is provided to requesting physician.    [facility] Preop Guidelines    Revised Cardiac Risk Index

## 2018-06-19 ENCOUNTER — ANESTHESIA (OUTPATIENT)
Dept: SURGERY | Facility: CLINIC | Age: 63
End: 2018-06-19
Payer: MEDICARE

## 2018-06-19 ENCOUNTER — HOSPITAL ENCOUNTER (OUTPATIENT)
Facility: CLINIC | Age: 63
Discharge: HOME | End: 2018-06-19
Attending: ORTHOPAEDIC SURGERY | Admitting: ORTHOPAEDIC SURGERY
Payer: MEDICARE

## 2018-06-19 VITALS
TEMPERATURE: 97.6 F | SYSTOLIC BLOOD PRESSURE: 90 MMHG | RESPIRATION RATE: 12 BRPM | OXYGEN SATURATION: 100 % | DIASTOLIC BLOOD PRESSURE: 38 MMHG | HEART RATE: 60 BPM

## 2018-06-19 DIAGNOSIS — Z98.890 S/P CUBITAL TUNNEL RELEASE: Primary | ICD-10-CM

## 2018-06-19 PROCEDURE — 25000128 H RX IP 250 OP 636: Performed by: NURSE ANESTHETIST, CERTIFIED REGISTERED

## 2018-06-19 PROCEDURE — A9270 NON-COVERED ITEM OR SERVICE: HCPCS | Mod: GY | Performed by: ORTHOPAEDIC SURGERY

## 2018-06-19 PROCEDURE — 40000306 ZZH STATISTIC PRE PROC ASSESS II: Performed by: ORTHOPAEDIC SURGERY

## 2018-06-19 PROCEDURE — 25000128 H RX IP 250 OP 636: Performed by: ORTHOPAEDIC SURGERY

## 2018-06-19 PROCEDURE — 27210794 ZZH OR GENERAL SUPPLY STERILE: Performed by: ORTHOPAEDIC SURGERY

## 2018-06-19 PROCEDURE — 25000125 ZZHC RX 250: Performed by: ORTHOPAEDIC SURGERY

## 2018-06-19 PROCEDURE — 37000009 ZZH ANESTHESIA TECHNICAL FEE, EACH ADDTL 15 MIN: Performed by: ORTHOPAEDIC SURGERY

## 2018-06-19 PROCEDURE — 25000566 ZZH SEVOFLURANE, EA 15 MIN: Performed by: ORTHOPAEDIC SURGERY

## 2018-06-19 PROCEDURE — 64718 REVISE ULNAR NERVE AT ELBOW: CPT | Mod: LT | Performed by: ORTHOPAEDIC SURGERY

## 2018-06-19 PROCEDURE — 27210995 ZZH RX 272: Performed by: ORTHOPAEDIC SURGERY

## 2018-06-19 PROCEDURE — 37000008 ZZH ANESTHESIA TECHNICAL FEE, 1ST 30 MIN: Performed by: ORTHOPAEDIC SURGERY

## 2018-06-19 PROCEDURE — 36000056 ZZH SURGERY LEVEL 3 1ST 30 MIN: Performed by: ORTHOPAEDIC SURGERY

## 2018-06-19 PROCEDURE — 71000014 ZZH RECOVERY PHASE 1 LEVEL 2 FIRST HR: Performed by: ORTHOPAEDIC SURGERY

## 2018-06-19 PROCEDURE — 71000027 ZZH RECOVERY PHASE 2 EACH 15 MINS: Performed by: ORTHOPAEDIC SURGERY

## 2018-06-19 PROCEDURE — 25000132 ZZH RX MED GY IP 250 OP 250 PS 637: Mod: GY | Performed by: ORTHOPAEDIC SURGERY

## 2018-06-19 PROCEDURE — 25000125 ZZHC RX 250: Performed by: NURSE ANESTHETIST, CERTIFIED REGISTERED

## 2018-06-19 PROCEDURE — 36000058 ZZH SURGERY LEVEL 3 EA 15 ADDTL MIN: Performed by: ORTHOPAEDIC SURGERY

## 2018-06-19 RX ORDER — ONDANSETRON 2 MG/ML
4 INJECTION INTRAMUSCULAR; INTRAVENOUS EVERY 30 MIN PRN
Status: DISCONTINUED | OUTPATIENT
Start: 2018-06-19 | End: 2018-06-19 | Stop reason: HOSPADM

## 2018-06-19 RX ORDER — FENTANYL CITRATE 50 UG/ML
INJECTION, SOLUTION INTRAMUSCULAR; INTRAVENOUS PRN
Status: DISCONTINUED | OUTPATIENT
Start: 2018-06-19 | End: 2018-06-19

## 2018-06-19 RX ORDER — METHOCARBAMOL 750 MG/1
750 TABLET, FILM COATED ORAL EVERY 6 HOURS PRN
Qty: 60 TABLET | Refills: 0 | Status: SHIPPED | OUTPATIENT
Start: 2018-06-19 | End: 2018-08-08

## 2018-06-19 RX ORDER — PROPOFOL 10 MG/ML
INJECTION, EMULSION INTRAVENOUS CONTINUOUS PRN
Status: DISCONTINUED | OUTPATIENT
Start: 2018-06-19 | End: 2018-06-19

## 2018-06-19 RX ORDER — ACETAMINOPHEN 325 MG/1
975 TABLET ORAL EVERY 8 HOURS PRN
Qty: 100 TABLET | Refills: 0 | Status: SHIPPED | OUTPATIENT
Start: 2018-06-19 | End: 2019-03-08

## 2018-06-19 RX ORDER — OXYCODONE HYDROCHLORIDE 5 MG/1
5-10 TABLET ORAL
Qty: 30 TABLET | Refills: 0 | Status: SHIPPED | OUTPATIENT
Start: 2018-06-19 | End: 2018-08-08

## 2018-06-19 RX ORDER — FENTANYL CITRATE 50 UG/ML
25-50 INJECTION, SOLUTION INTRAMUSCULAR; INTRAVENOUS
Status: DISCONTINUED | OUTPATIENT
Start: 2018-06-19 | End: 2018-06-19 | Stop reason: HOSPADM

## 2018-06-19 RX ORDER — KETOROLAC TROMETHAMINE 30 MG/ML
30 INJECTION, SOLUTION INTRAMUSCULAR; INTRAVENOUS ONCE
Status: COMPLETED | OUTPATIENT
Start: 2018-06-19 | End: 2018-06-19

## 2018-06-19 RX ORDER — DEXAMETHASONE SODIUM PHOSPHATE 10 MG/ML
INJECTION, SOLUTION INTRAMUSCULAR; INTRAVENOUS PRN
Status: DISCONTINUED | OUTPATIENT
Start: 2018-06-19 | End: 2018-06-19

## 2018-06-19 RX ORDER — SODIUM CHLORIDE, SODIUM LACTATE, POTASSIUM CHLORIDE, CALCIUM CHLORIDE 600; 310; 30; 20 MG/100ML; MG/100ML; MG/100ML; MG/100ML
INJECTION, SOLUTION INTRAVENOUS CONTINUOUS
Status: DISCONTINUED | OUTPATIENT
Start: 2018-06-19 | End: 2018-06-19 | Stop reason: HOSPADM

## 2018-06-19 RX ORDER — CEFAZOLIN SODIUM 2 G/100ML
2 INJECTION, SOLUTION INTRAVENOUS
Status: COMPLETED | OUTPATIENT
Start: 2018-06-19 | End: 2018-06-19

## 2018-06-19 RX ORDER — NALOXONE HYDROCHLORIDE 0.4 MG/ML
.1-.4 INJECTION, SOLUTION INTRAMUSCULAR; INTRAVENOUS; SUBCUTANEOUS
Status: DISCONTINUED | OUTPATIENT
Start: 2018-06-19 | End: 2018-06-19 | Stop reason: HOSPADM

## 2018-06-19 RX ORDER — ONDANSETRON 2 MG/ML
INJECTION INTRAMUSCULAR; INTRAVENOUS PRN
Status: DISCONTINUED | OUTPATIENT
Start: 2018-06-19 | End: 2018-06-19

## 2018-06-19 RX ORDER — MEPERIDINE HYDROCHLORIDE 50 MG/ML
12.5 INJECTION INTRAMUSCULAR; INTRAVENOUS; SUBCUTANEOUS
Status: DISCONTINUED | OUTPATIENT
Start: 2018-06-19 | End: 2018-06-19 | Stop reason: HOSPADM

## 2018-06-19 RX ORDER — CEFAZOLIN SODIUM 1 G/3ML
1 INJECTION, POWDER, FOR SOLUTION INTRAMUSCULAR; INTRAVENOUS SEE ADMIN INSTRUCTIONS
Status: DISCONTINUED | OUTPATIENT
Start: 2018-06-19 | End: 2018-06-19 | Stop reason: HOSPADM

## 2018-06-19 RX ORDER — LIDOCAINE 40 MG/G
CREAM TOPICAL
Status: DISCONTINUED | OUTPATIENT
Start: 2018-06-19 | End: 2018-06-19 | Stop reason: HOSPADM

## 2018-06-19 RX ORDER — EPHEDRINE SULFATE 50 MG/ML
INJECTION, SOLUTION INTRAMUSCULAR; INTRAVENOUS; SUBCUTANEOUS PRN
Status: DISCONTINUED | OUTPATIENT
Start: 2018-06-19 | End: 2018-06-19

## 2018-06-19 RX ORDER — LIDOCAINE HYDROCHLORIDE 20 MG/ML
INJECTION, SOLUTION INFILTRATION; PERINEURAL PRN
Status: DISCONTINUED | OUTPATIENT
Start: 2018-06-19 | End: 2018-06-19

## 2018-06-19 RX ORDER — BUPIVACAINE HYDROCHLORIDE 5 MG/ML
INJECTION, SOLUTION PERINEURAL PRN
Status: DISCONTINUED | OUTPATIENT
Start: 2018-06-19 | End: 2018-06-19 | Stop reason: HOSPADM

## 2018-06-19 RX ORDER — GLYCOPYRROLATE 0.2 MG/ML
INJECTION, SOLUTION INTRAMUSCULAR; INTRAVENOUS PRN
Status: DISCONTINUED | OUTPATIENT
Start: 2018-06-19 | End: 2018-06-19

## 2018-06-19 RX ORDER — OXYCODONE HYDROCHLORIDE 5 MG/1
5 TABLET ORAL
Status: COMPLETED | OUTPATIENT
Start: 2018-06-19 | End: 2018-06-19

## 2018-06-19 RX ORDER — PROPOFOL 10 MG/ML
INJECTION, EMULSION INTRAVENOUS PRN
Status: DISCONTINUED | OUTPATIENT
Start: 2018-06-19 | End: 2018-06-19

## 2018-06-19 RX ORDER — ONDANSETRON 4 MG/1
4 TABLET, ORALLY DISINTEGRATING ORAL EVERY 30 MIN PRN
Status: DISCONTINUED | OUTPATIENT
Start: 2018-06-19 | End: 2018-06-19 | Stop reason: HOSPADM

## 2018-06-19 RX ORDER — HYDROMORPHONE HYDROCHLORIDE 1 MG/ML
.3-.5 INJECTION, SOLUTION INTRAMUSCULAR; INTRAVENOUS; SUBCUTANEOUS EVERY 10 MIN PRN
Status: DISCONTINUED | OUTPATIENT
Start: 2018-06-19 | End: 2018-06-19 | Stop reason: HOSPADM

## 2018-06-19 RX ADMIN — DEXAMETHASONE SODIUM PHOSPHATE 10 MG: 10 INJECTION, SOLUTION INTRAMUSCULAR; INTRAVENOUS at 08:40

## 2018-06-19 RX ADMIN — Medication 5 MG: at 07:49

## 2018-06-19 RX ADMIN — MIDAZOLAM 2 MG: 1 INJECTION INTRAMUSCULAR; INTRAVENOUS at 07:27

## 2018-06-19 RX ADMIN — FENTANYL CITRATE 50 MCG: 50 INJECTION, SOLUTION INTRAMUSCULAR; INTRAVENOUS at 09:27

## 2018-06-19 RX ADMIN — OXYCODONE HYDROCHLORIDE 5 MG: 5 TABLET ORAL at 11:40

## 2018-06-19 RX ADMIN — PHENYLEPHRINE HYDROCHLORIDE 100 MCG: 10 INJECTION, SOLUTION INTRAMUSCULAR; INTRAVENOUS; SUBCUTANEOUS at 08:25

## 2018-06-19 RX ADMIN — PHENYLEPHRINE HYDROCHLORIDE 100 MCG: 10 INJECTION, SOLUTION INTRAMUSCULAR; INTRAVENOUS; SUBCUTANEOUS at 08:07

## 2018-06-19 RX ADMIN — HYDROMORPHONE HYDROCHLORIDE 0.5 MG: 1 INJECTION, SOLUTION INTRAMUSCULAR; INTRAVENOUS; SUBCUTANEOUS at 09:44

## 2018-06-19 RX ADMIN — HYDROMORPHONE HYDROCHLORIDE 0.5 MG: 1 INJECTION, SOLUTION INTRAMUSCULAR; INTRAVENOUS; SUBCUTANEOUS at 09:28

## 2018-06-19 RX ADMIN — Medication 10 MG: at 08:03

## 2018-06-19 RX ADMIN — GLYCOPYRROLATE 0.2 MG: 0.2 INJECTION, SOLUTION INTRAMUSCULAR; INTRAVENOUS at 08:24

## 2018-06-19 RX ADMIN — CEFAZOLIN SODIUM 2 G: 2 INJECTION, SOLUTION INTRAVENOUS at 07:36

## 2018-06-19 RX ADMIN — PHENYLEPHRINE HYDROCHLORIDE 100 MCG: 10 INJECTION, SOLUTION INTRAMUSCULAR; INTRAVENOUS; SUBCUTANEOUS at 08:39

## 2018-06-19 RX ADMIN — LIDOCAINE HYDROCHLORIDE 100 MG: 20 INJECTION, SOLUTION INFILTRATION; PERINEURAL at 07:33

## 2018-06-19 RX ADMIN — Medication 10 MG: at 08:00

## 2018-06-19 RX ADMIN — SODIUM CHLORIDE, POTASSIUM CHLORIDE, SODIUM LACTATE AND CALCIUM CHLORIDE: 600; 310; 30; 20 INJECTION, SOLUTION INTRAVENOUS at 08:44

## 2018-06-19 RX ADMIN — PHENYLEPHRINE HYDROCHLORIDE 100 MCG: 10 INJECTION, SOLUTION INTRAMUSCULAR; INTRAVENOUS; SUBCUTANEOUS at 08:24

## 2018-06-19 RX ADMIN — PROPOFOL 200 MCG/KG/MIN: 10 INJECTION, EMULSION INTRAVENOUS at 07:33

## 2018-06-19 RX ADMIN — SODIUM CHLORIDE, POTASSIUM CHLORIDE, SODIUM LACTATE AND CALCIUM CHLORIDE: 600; 310; 30; 20 INJECTION, SOLUTION INTRAVENOUS at 07:05

## 2018-06-19 RX ADMIN — ONDANSETRON 4 MG: 2 INJECTION INTRAMUSCULAR; INTRAVENOUS at 08:40

## 2018-06-19 RX ADMIN — PROPOFOL 200 MG: 10 INJECTION, EMULSION INTRAVENOUS at 07:33

## 2018-06-19 RX ADMIN — KETOROLAC TROMETHAMINE 30 MG: 30 INJECTION, SOLUTION INTRAMUSCULAR at 10:02

## 2018-06-19 RX ADMIN — FENTANYL CITRATE 50 MCG: 50 INJECTION, SOLUTION INTRAMUSCULAR; INTRAVENOUS at 07:57

## 2018-06-19 NOTE — DISCHARGE INSTRUCTIONS
Keep your splint on. We will take if off at your follow up visit.  Ok to wiggle your fingers.  No lifting, pushing or pulling with your arm.     Same-Day Surgery   Adult Discharge Orders & Instructions     For 24 hours after surgery    1. Get plenty of rest.  A responsible adult must stay with you for at least 24 hours after you leave the hospital.   2. Do not drive or use heavy equipment.  If you have weakness or tingling, don't drive or use heavy equipment until this feeling goes away.  3. Do not drink alcohol.  4. Avoid strenuous or risky activities.  Ask for help when climbing stairs.   5. You may feel lightheaded.  IF so, sit for a few minutes before standing.  Have someone help you get up.   6. You may have a slight fever. Call the doctor if your fever is over 100 F (37.7 C) (taken under the tongue) or lasts longer than 24 hours.  7. You may have a dry mouth, a sore throat, muscle aches or trouble sleeping.  These should go away after 24 hours.  8. Do not make important or legal decisions.  Based on the surgery/procedure that you had today, we do not anticipate that you will have any problems.  However, given the various responses that patients can have to the surgical experience, we want to ensure that you have information available to manage pain or nausea and what to do if you observe bleeding or you develop any signs and symptoms of infection:  Methods to control pain include:  Prescription pain medication or over the counter medications as prescribed or suggested by your physician.  In addition, ice packs and periods of rest are often helpful.  If your pain is not managed with the above methods, contact your physician.  Methods to control nausea include:  Anti-nausea medication approved by your physician.  Drink clear liquids such as apple juice, ginger ale, broth or 7-Up. Be sure to drink enough fluids.  Move to a regular diet as you feel able.  Rest may also help.  Bleeding:  It's not uncommon to see a  little blood staining on the dressing, about the size of a quarter in the first 24 hours; if you see this, there is no reason to be alarmed.  However, should this continue to increase in size, apply pressure if able, ant notify your physician.  Infection:  We do not anticipate that you will acquire an infection, but if you should experience any of the following symptoms:  redness, swelling, heat, increasing pain or abnormal drainage at your surgery site, fever or chills, please notify your physician.    Call your doctor for any of the followin.  Signs of infection (fever, growing tenderness at the surgery site, a large amount of drainage or bleeding, severe pain, foul-smelling drainage, redness, swelling).    2. It has been over 8 to 10 hours since surgery and you are still not able to urinate (pass water).    3.  Headache for over 24 hours.    4.  Numbness, tingling or weakness the day after surgery (if you had spinal anesthesia).    Nurse advice line: [phone]    You may have Ibuprofen at 5 PM

## 2018-06-19 NOTE — ANESTHESIA PREPROCEDURE EVALUATION
Anesthesia Evaluation     . Pt has had prior anesthetic. Type: General and MAC    No history of anesthetic complications          ROS/MED HX    ENT/Pulmonary:  - neg pulmonary ROS   (+)TRENT risk factors snores loudly, obese, tobacco use, Current use cigars packs/day  , . .    Neurologic: Comment: History of two CVA's. Patient states that the last CVA was about one year ago.     (+)CVA date: 2015 with deficits- left sidedweakness, other neuro Lesion of left ulnar nerve. Cubital tunnel syndrome (left).     Cardiovascular:     (+) Dyslipidemia, ----. : . . . :. . Previous cardiac testing date:results:date: results:ECG reviewed date:8/23/16 results:NSR date: results:          METS/Exercise Tolerance:     Hematologic:  - neg hematologic  ROS       Musculoskeletal:  - neg musculoskeletal ROS       GI/Hepatic:     (+) GERD Asymptomatic on medication,       Renal/Genitourinary:  - ROS Renal section negative       Endo:     (+) Obesity, .      Psychiatric:  - neg psychiatric ROS       Infectious Disease:  - neg infectious disease ROS       Malignancy:      - no malignancy   Other: Comment: History of total fusion (L4-L5-S1) 2 years ago.     Right eye redness.    (+) No chance of pregnancy C-spine cleared: N/A, H/O Chronic Pain,no other significant disability                    Physical Exam  Normal systems: cardiovascular, pulmonary and dental    Airway   Mallampati: I  TM distance: >3 FB  Neck ROM: full    Dental     Cardiovascular   Rhythm and rate: regular and normal      Pulmonary    breath sounds clear to auscultation                    Anesthesia Plan      History & Physical Review  History and physical reviewed and following examination; no interval change.    ASA Status:  3 .    NPO Status:  > 8 hours    Plan for General and LMA with Intravenous and Propofol induction. Maintenance will be Balanced.    PONV prophylaxis:  Ondansetron and Dexamethasone       Postoperative Care  Postoperative pain management:  IV  analgesics and Oral pain medications.      Consents  Anesthetic plan, risks, benefits and alternatives discussed with:  Patient.  Use of blood products discussed: No .   .                          .

## 2018-06-19 NOTE — OP NOTE
Procedure Date: 06/19/2018      DATE OF PROCEDURE:  06/19/2018      PREOPERATIVE DIAGNOSIS:  Left cubital tunnel syndrome.      POSTOPERATIVE DIAGNOSIS:  Left cubital tunnel syndrome.      PROCEDURE:  Left ulnar nerve decompression at the elbow with subcutaneous transposition.      SURGEON:  Steve Parks DO      FIRST ASSISTANT:  EL Sanderson (utilized throughout the procedure assisting with soft tissue retraction).      ANESTHESIA:  General.      COMPLICATIONS:  None.      ESTIMATED BLOOD LOSS:  Less than 10 mL.      FLUIDS:  1200 mL crystalloids.      COUNTS:  Correct.      DISPOSITION:  PACU in stable condition.      GROSS FINDINGS:  The previous skin incision was utilized that was a curvilinear along the medial epicondyle.  There was significant scarring around the ulnar nerve in all directions.  Once released around the medial epicondyle there was an hourglass deformity with hyperemia to the nerve.      NOTE:  This is a 63-year-old female who underwent previous in situ left ulnar nerve decompression by myself.  She initially started noticing improvements postoperatively.  However, approximately 6 months after surgery things started getting worse.  This was in 09/2016.  Similar symptoms; she is dropping objects, cramping pain, numbness and tingling.  Her exam was consistent with cubital tunnel syndrome.  She did have an EMG which showed chronic neurogenic changes, the ulnar nerve with mild left carpal tunnel syndrome.  I discussed her options.  She had no clinical carpal tunnel syndrome so I recommended continued observation.  However, given her ulnar nerve symptoms that have been progressing for the last 2 years causing impacting the quality of life including dropping objects and numbness and tingling, we discussed decompression with transposition.  A lengthy discussion regarding the limitations of the surgery.  We also discussed timeframe for recovery and that may take upwards of a year.  Risks and  benefits were reviewed.  Once discussed, she opted to proceed.      DETAILS OF PROCEDURE:  She was met preoperatively, again informed consent was verified, appropriate site was marked.  She was wheeled to OP suite #1.  Transferred to the OR bed with no issues.  When deemed appropriate by Anesthesia, left upper extremity sterilely prepped and draped in normal manner.  A sterile tourniquet was applied; however, never inflated throughout the case.  The previous incision was utilized after timeout.  The appropriate patient, surgery and extremity were verified and antibiotics administered.  A combination of blunt and sharp dissection carried the dissection down to the level of the medial epicondyle and common flexor mass and fascia proximally.  Here, it was significantly adhesed.  Carefully the nerve was freed up proximal and identified.  Dissection was carried down to the level of the medial epicondyle from a proximal distal direction.  Then with combination of blunt and sharp dissection, the dissection was carried through the area into the FCU aponeurosis.  Care was taken to preserve the medial antebrachial nerves.  Distally, there were some FCU branches that were dissected out.  One branch was sacrificed in order to allow for the nerve to adequately be transposed and avoid any kinking at the nerve.  This was carefully freed with the arm in extension and the nerve was brought over the medial epicondyle with no kinking or tension on it.  I then created a fascial sling off the medial flexor mass.  I was able to bring this up and suture it to the subcutaneous tissue.  I utilized Ethibond suture for this and passed it in [id number].  I could take the elbow through range of motion.  There was no subluxing of the nerve.  It was stable in its new home.  The area was irrigated.  Hemostasis had been achieved on the approach.  Subcutaneous tissue is closed with 3-0 Vicryl, followed with a running 4-0 Monocryl suture with  Steri-Strips in the skin.  A sterile bandage was applied, followed with a posterior molded splint.  She was subsequently transferred to PACU in stable condition.  She will be discharged home with oxycodone, Tylenol and Robaxin for pain.  Followup appointment has been scheduled, discharge instructions provided.         MIKAL TAM DO             D: 2018   T: 2018   MT: CC      Name:     TEMI CONCEPCION   MRN:      [id number]        Account:        [id number]   :      1955           Procedure Date: 2018      Document: [id number]

## 2018-06-19 NOTE — BRIEF OP NOTE
[facility] Brief Operative Note    Pre-operative diagnosis: Left cubital tunnel syndrome   Post-operative diagnosis: Same   Procedure: Procedure(s): left ulnar nerve decompression with subcutaneous  TRANSPOSITION ULNAR NERVE (ELBOW)   Surgeon: Steve Parks DO   Assistant(s): Osorio Burleson, APRN, CNP, DNP (A advanced practice provider was necessary for his expertise, exposure and surgical assistance throughout the case.)   Estimated blood loss:  Fluids: Less than 10 ml  1200 ml Crystalloids   Specimens: None   Findings: See dictated operative report for full details [id number]     Madan Parks D.O.

## 2018-06-19 NOTE — ANESTHESIA POSTPROCEDURE EVALUATION
Patient: Lani Barry    Procedure(s):  Left ulnar nerve decompression with transposition - Wound Class: I-Clean    Diagnosis:Left cubital tunnel syndrome  Diagnosis Additional Information: No value filed.    Anesthesia Type:  General, LMA    Note:  Anesthesia Post Evaluation    Patient location during evaluation: Phase 2 and Bedside  Patient participation: Able to fully participate in evaluation  Level of consciousness: awake and alert  Pain management: adequate  Airway patency: patent  Cardiovascular status: acceptable  Respiratory status: acceptable  Hydration status: acceptable  PONV: none     Anesthetic complications: None    Comments: Patient was happy with her anesthesia care today. She has no concerns. Vital signs stable. No anesthesia related complications noted. DC when ready.        Last vitals:  Vitals:    06/19/18 1030 06/19/18 1040 06/19/18 1045   BP: 96/51 100/50 104/57   Pulse:      Resp:   12   Temp:   97.6  F (36.4  C)   SpO2: 99% 90% 100%         Electronically Signed By: EL Davies CRNA  June 19, 2018  11:32 AM

## 2018-06-19 NOTE — IP AVS SNAPSHOT
MRN:[id number]                      After Visit Summary   6/19/2018    Lani Barry    MRN: [id number]           Thank you!     Thank you for choosing [facility] for your care. Our goal is always to provide you with excellent care. Hearing back from our patients is one way we can continue to improve our services. Please take a few minutes to complete the written survey that you may receive in the mail after you visit with us. Thank you!        Patient Information     Date Of Birth          1955        About your hospital stay     You were admitted on:  June 19, 2018 You last received care in the:  [facility] Phase II    You were discharged on:  June 19, 2018       Who to Call     For medical emergencies, please call 911.  For non-urgent questions about your medical care, please call your primary care provider or clinic, [phone]  For questions related to your surgery, please call your surgery clinic        Attending Provider     Provider Steve Longo DO Orthopedics       Primary Care Provider Office Phone # Fax #    [facility] Miguel Arias [phone]      After Care Instructions      Diet as Tolerated       Return to diet before surgery, unless instructed otherwise.            Discharge Instructions       Review outpatient procedure discharge instructions with patient as directed by Provider            Discharge Instructions - Lifting Limit (specify)       Lifting limit  0 pounds until seen at Post-op follow up appointment.            Ice to affected area       Ice pack to surgical site every 15 minutes per hour for 24 hours            No Alcohol       For 24 hours post procedure or when taking pain pills            No Dressing Change       No dressing change until follow up appointment.  Keep it clean and dry.            No driving or operating machinery        until further notice            Notify Provider       For signs and  symptoms of infection: Fever greater than 101, redness, swelling, heat at site, drainage, pus.            Return to clinic       Return to clinic in 10 days            Wound care       Do not immerse wound in water until sutures removed                  Your next 10 appointments already scheduled     Jun 29, 2018  1:20 PM CDT   [facility]    [address] [address]   [phone]            Jul 12, 2018 10:00 AM CDT   New Visit with Luigi Wong MD   [facility] ([facility])    [address] [id number] [phone]              Further instructions from your care team       Keep your splint on. We will take if off at your follow up visit.  Ok to wiggle your fingers.  No lifting, pushing or pulling with your arm.     Same-Day Surgery   Adult Discharge Orders & Instructions     For 24 hours after surgery    1. Get plenty of rest.  A responsible adult must stay with you for at least 24 hours after you leave the hospital.   2. Do not drive or use heavy equipment.  If you have weakness or tingling, don't drive or use heavy equipment until this feeling goes away.  3. Do not drink alcohol.  4. Avoid strenuous or risky activities.  Ask for help when climbing stairs.   5. You may feel lightheaded.  IF so, sit for a few minutes before standing.  Have someone help you get up.   6. You may have a slight fever. Call the doctor if your fever is over 100 F (37.7 C) (taken under the tongue) or lasts longer than 24 hours.  7. You may have a dry mouth, a sore throat, muscle aches or trouble sleeping.  These should go away after 24 hours.  8. Do not make important or legal decisions.  Based on the surgery/procedure that you had today, we do not anticipate that you will have any problems.  However, given the various responses that patients can have to  the surgical experience, we want to ensure that you have information available to manage pain or nausea and what to do if you observe bleeding or you develop any signs and symptoms of infection:  Methods to control pain include:  Prescription pain medication or over the counter medications as prescribed or suggested by your physician.  In addition, ice packs and periods of rest are often helpful.  If your pain is not managed with the above methods, contact your physician.  Methods to control nausea include:  Anti-nausea medication approved by your physician.  Drink clear liquids such as apple juice, ginger ale, broth or 7-Up. Be sure to drink enough fluids.  Move to a regular diet as you feel able.  Rest may also help.  Bleeding:  It's not uncommon to see a little blood staining on the dressing, about the size of a quarter in the first 24 hours; if you see this, there is no reason to be alarmed.  However, should this continue to increase in size, apply pressure if able, ant notify your physician.  Infection:  We do not anticipate that you will acquire an infection, but if you should experience any of the following symptoms:  redness, swelling, heat, increasing pain or abnormal drainage at your surgery site, fever or chills, please notify your physician.    Call your doctor for any of the followin.  Signs of infection (fever, growing tenderness at the surgery site, a large amount of drainage or bleeding, severe pain, foul-smelling drainage, redness, swelling).    2. It has been over 8 to 10 hours since surgery and you are still not able to urinate (pass water).    3.  Headache for over 24 hours.    4.  Numbness, tingling or weakness the day after surgery (if you had spinal anesthesia).    Nurse advice line: [phone]    You may have Ibuprofen at 5 PM      Pending Results     No orders found from 2018 to 2018.            Admission Information     Date & Time Provider Department Dept. Phone     "2018 Steve Parks,  [facility] Phase -[phone]      Your Vitals Were     Blood Pressure Pulse Temperature Respirations Pulse Oximetry       104/57 60 97.6  F (36.4  C) (Temporal) 12 100%       MyChart Information     [employer] lets you send messages to your doctor, view your test results, renew your prescriptions, schedule appointments and more. To sign up, go to [email or web address] . Click on \"Log in\" on the left side of the screen, which will take you to the Welcome page. Then click on \"Sign up Now\" on the right side of the page.     You will be asked to enter the access code listed below, as well as some personal information. Please follow the directions to create your username and password.     Your access code is: [email or web address]  Expires: 2018  2:02 PM     Your access code will  in 90 days. If you need help or a new code, please call your [facility] clinic or [phone].        Care EveryWhere ID     This is your Care EveryWhere ID. This could be used by other organizations to access your [facility] medical records  [id number]        Equal Access to Services     ELKIN PELAEZ AH: Angela Funk, wawilliam garibay, qajaniata kaalmada cecy, tona acosta. So [facility] [phone].    ATENCIÓN: Si habla español, tiene a reaves disposición servicios gratuitos de asistencia lingüística. Lynda al [phone].    We comply with applicable federal civil rights laws and [place] laws. We do not discriminate on the basis of race, color, national origin, age, disability, sex, sexual orientation, or gender identity.               Review of your medicines      START taking        Dose / Directions    acetaminophen 325 MG tablet   Commonly known as:  TYLENOL   Used for:  S/P cubital tunnel release        Dose:  975 mg   Take 3 tablets (975 mg) by mouth every 8 hours as needed for other (mild pain)   Quantity:  100 tablet   Refills:  0       " methocarbamol 750 MG tablet   Commonly known as:  ROBAXIN   Used for:  S/P cubital tunnel release        Dose:  750 mg   Take 1 tablet (750 mg) by mouth every 6 hours as needed for muscle spasms (muscle spasm)   Quantity:  60 tablet   Refills:  0       oxyCODONE IR 5 MG tablet   Commonly known as:  ROXICODONE   Used for:  S/P cubital tunnel release        Dose:  5-10 mg   Take 1-2 tablets (5-10 mg) by mouth every 3 hours as needed for pain or other (Moderate to Severe)   Quantity:  30 tablet   Refills:  0         CONTINUE these medicines which have NOT CHANGED        Dose / Directions    ASPIRIN PO        Dose:  81 mg   Take 81 mg by mouth   Refills:  0       CRESTOR PO        Dose:  40 mg   Take 40 mg by mouth   Refills:  0       DIPHENHYDRAMINE HCL PO        Dose:  50 mg   Take 50 mg by mouth every 6 hours as needed   Refills:  0       fluocinolone 0.01 % solution   Commonly known as:  SYNALAR   Used for:  Intrinsic eczema        APPLY TOPICALLY TWICE A DAY   Quantity:  60 mL   Refills:  3       varenicline 1 MG tablet   Commonly known as:  CHANTIX   Used for:  Tobacco abuse        Dose:  1 mg   Take 1 tablet (1 mg) by mouth 2 times daily   Quantity:  60 tablet   Refills:  1            Where to get your medicines      Some of these will need a paper prescription and others can be bought over the counter. Ask your nurse if you have questions.     Bring a paper prescription for each of these medications     acetaminophen 325 MG tablet    methocarbamol 750 MG tablet    oxyCODONE IR 5 MG tablet                Protect others around you: Learn how to safely use, store and throw away your medicines at www.disposemymeds.org.        Information about OPIOIDS     PRESCRIPTION OPIOIDS: WHAT YOU NEED TO KNOW   We gave you an opioid (narcotic) pain medicine. It is important to manage your pain, but opioids are not always the best choice. You should first try all the other options your care team gave you. Take this medicine for  as short a time (and as few doses) as possible.     These medicines have risks:    DO NOT drive when on new or higher doses of pain medicine. These medicines can affect your alertness and reaction times, and you could be arrested for driving under the influence (DUI). If you need to use opioids long-term, talk to your care team about driving.    DO NOT operate heave machinery    DO NOT do any other dangerous activities while taking these medicines.     DO NOT drink any alcohol while taking these medicines.      If the opioid prescribed includes acetaminophen, DO NOT take with any other medicines that contain acetaminophen. Read all labels carefully. Look for the word  acetaminophen  or  Tylenol.  Ask your pharmacist if you have questions or are unsure.    You can get addicted to pain medicines, especially if you have a history of addiction (chemical, alcohol or substance dependence). Talk to your care team about ways to reduce this risk.    Store your pills in a secure place, locked if possible. We will not replace any lost or stolen medicine. If you don t finish your medicine, please throw away (dispose) as directed by your pharmacist. The [place] Pollution Control Agency has more information about safe disposal: https://www.pca.[facility].[place].us/living-green/managing-unwanted-medications.     All opioids tend to cause constipation. Drink plenty of water and eat foods that have a lot of fiber, such as fruits, vegetables, prune juice, apple juice and high-fiber cereal. Take a laxative (Miralax, milk of magnesia, Colace, Senna) if you don t move your bowels at least every other day.              Medication List: This is a list of all your medications and when to take them. Check marks below indicate your daily home schedule. Keep this list as a reference.      Medications           Morning Afternoon Evening Bedtime As Needed    acetaminophen 325 MG tablet   Commonly known as:  TYLENOL   Take 3 tablets (975 mg) by mouth  every 8 hours as needed for other (mild pain)                                ASPIRIN PO   Take 81 mg by mouth                                CRESTOR PO   Take 40 mg by mouth                                DIPHENHYDRAMINE HCL PO   Take 50 mg by mouth every 6 hours as needed                                fluocinolone 0.01 % solution   Commonly known as:  SYNALAR   APPLY TOPICALLY TWICE A DAY                                methocarbamol 750 MG tablet   Commonly known as:  ROBAXIN   Take 1 tablet (750 mg) by mouth every 6 hours as needed for muscle spasms (muscle spasm)                                oxyCODONE IR 5 MG tablet   Commonly known as:  ROXICODONE   Take 1-2 tablets (5-10 mg) by mouth every 3 hours as needed for pain or other (Moderate to Severe)   Last time this was given:  5 mg on 6/19/2018 11:40 AM                                varenicline 1 MG tablet   Commonly known as:  CHANTIX   Take 1 tablet (1 mg) by mouth 2 times daily

## 2018-06-19 NOTE — ANESTHESIA CARE TRANSFER NOTE
Patient: Lani Barry    Procedure(s):  Left ulnar nerve decompression with transposition - Wound Class: I-Clean    Diagnosis: Left cubital tunnel syndrome  Diagnosis Additional Information: No value filed.    Anesthesia Type:   General, LMA     Note:  Airway :Face Mask  Patient transferred to:PACU  Handoff Report: Identifed the Patient, Identified the Reponsible Provider, Reviewed the pertinent medical history, Discussed the surgical course, Reviewed Intra-OP anesthesia mangement and issues during anesthesia, Set expectations for post-procedure period and Allowed opportunity for questions and acknowledgement of understanding      Vitals: (Last set prior to Anesthesia Care Transfer)    CRNA VITALS  6/19/2018 0858 - 6/19/2018 0939      6/19/2018             SpO2: 97 %                Electronically Signed By: EL Davies CRNA  June 19, 2018  9:39 AM

## 2018-06-19 NOTE — PROGRESS NOTES
Please contact the patient and notify her of the following:    The urine sample is negative.  The cholesterol is minimally elevated with an LDL of 107.  Liver tests are normal.  Blood cell count is normal without evidence of anemia or leukemia.  Thank you.   DO ADOLFO Barth

## 2018-06-19 NOTE — INTERVAL H&P NOTE
This H&P has been reviewed and there are no clinically significant changes in the patient s condition.  The patient was evaluated by myself as well as Dr. Lee Arias prior to surgery. The Patient is approved for the surgery and the stated surgical procedure is still clinically indicated.

## 2018-06-19 NOTE — IP AVS SNAPSHOT
[facility] Phase II    [address] [place] [address]    Phone:  [phone]                                       After Visit Summary   6/19/2018    Lani Barry    MRN: [id number]           After Visit Summary Signature Page     I have received my discharge instructions, and my questions have been answered. I have discussed any challenges I see with this plan with the nurse or doctor.    ..........................................................................................................................................  Patient/Patient Representative Signature      ..........................................................................................................................................  Patient Representative Print Name and Relationship to Patient    ..................................................               ................................................  Date                                            Time    ..........................................................................................................................................  Reviewed by Signature/Title    ...................................................              ..............................................  Date                                                            Time

## 2018-06-21 ENCOUNTER — NURSE TRIAGE (OUTPATIENT)
Dept: NURSING | Facility: CLINIC | Age: 63
End: 2018-06-21

## 2018-06-21 ENCOUNTER — HOSPITAL ENCOUNTER (EMERGENCY)
Facility: CLINIC | Age: 63
Discharge: HOME | End: 2018-06-21
Attending: FAMILY MEDICINE | Admitting: FAMILY MEDICINE
Payer: MEDICARE

## 2018-06-21 VITALS
HEART RATE: 67 BPM | DIASTOLIC BLOOD PRESSURE: 63 MMHG | TEMPERATURE: 98.3 F | WEIGHT: 209.1 LBS | RESPIRATION RATE: 20 BRPM | BODY MASS INDEX: 39.83 KG/M2 | SYSTOLIC BLOOD PRESSURE: 129 MMHG | OXYGEN SATURATION: 99 %

## 2018-06-21 DIAGNOSIS — G89.18 POSTOPERATIVE PAIN: ICD-10-CM

## 2018-06-21 PROCEDURE — 99283 EMERGENCY DEPT VISIT LOW MDM: CPT | Mod: Z6 | Performed by: FAMILY MEDICINE

## 2018-06-21 PROCEDURE — 99283 EMERGENCY DEPT VISIT LOW MDM: CPT | Performed by: FAMILY MEDICINE

## 2018-06-21 NOTE — ED AVS SNAPSHOT
[facility] Emergency Department    [address]     [place] [address]    Phone:  [phone]    Fax:  [phone]                                       Lani Barry   MRN: [id number]    Department:  [facility] Emergency Department   Date of Visit:  6/21/2018           Patient Information     Date Of Birth          1955        Your diagnoses for this visit were:     Postoperative pain Left arm with swelling to hand       You were seen by James Kaur DO.      Follow-up Information     Follow up with Steve Parks DO.    Specialty:  Orthopedics    Why:  as planned    Contact information:    [address] [facility] [facility] [address] [address] [phone]          Follow up with [facility] Emergency Department.    Specialty:  EMERGENCY MEDICINE    Why:  If symptoms worsen    Contact information:    [address]   [place] [id number] [phone]    Additional information:    From [address]: Exit at [address] on [address] side of [place]. Turn right on [address]. Turn left at stoplight on [address]. [facility] will be in view two blocks ahead        Discharge Instructions       Please read and follow the handout(s) instructions. Return, if needed, for increased or worsening symptoms and as directed by the handout(s).    Keep the arm elevated to help the swelling.     I'm glad you are feeling improved.     Follow-up with Dr. Parks as planned.    Electronically signed, James Kaur DO      Discharge References/Attachments     ACE WRAP (ENGLISH)      Your next 10 appointments already scheduled     Jun 29, 2018  1:20 PM CDT   [facility]    [address] [place] [id number] [phone]            Jul 12, 2018 10:00 AM CDT   New Visit with Luigi Wong MD   [facility] [place] ([facility]  [facility] [place]    [address]  [place] [address] [phone]              24 Hour Appointment Hotline       To make an appointment at any [facility], call [phone] ([phone]). If you don't have a family doctor or clinic, we will help you find one. [facility] clinics are conveniently located to serve the needs of you and your family.             Review of your medicines      Our records show that you are taking the medicines listed below. If these are incorrect, please call your family doctor or clinic.        Dose / Directions Last dose taken    acetaminophen 325 MG tablet   Commonly known as:  TYLENOL   Dose:  975 mg   Quantity:  100 tablet        Take 3 tablets (975 mg) by mouth every 8 hours as needed for other (mild pain)   Refills:  0        ASPIRIN PO   Dose:  81 mg        Take 81 mg by mouth   Refills:  0        CRESTOR PO   Dose:  40 mg        Take 40 mg by mouth   Refills:  0        DIPHENHYDRAMINE HCL PO   Dose:  50 mg        Take 50 mg by mouth every 6 hours as needed   Refills:  0        fluocinolone 0.01 % solution   Commonly known as:  SYNALAR   Quantity:  60 mL        APPLY TOPICALLY TWICE A DAY   Refills:  3        methocarbamol 750 MG tablet   Commonly known as:  ROBAXIN   Dose:  750 mg   Quantity:  60 tablet        Take 1 tablet (750 mg) by mouth every 6 hours as needed for muscle spasms (muscle spasm)   Refills:  0        oxyCODONE IR 5 MG tablet   Commonly known as:  ROXICODONE   Dose:  5-10 mg   Quantity:  30 tablet        Take 1-2 tablets (5-10 mg) by mouth every 3 hours as needed for pain or other (Moderate to Severe)   Refills:  0        varenicline 1 MG tablet   Commonly known as:  CHANTIX   Dose:  1 mg   Quantity:  60 tablet        Take 1 tablet (1 mg) by mouth 2 times daily   Refills:  1                Orders Needing Specimen Collection     None      Pending Results     No orders found from 6/19/2018 to 6/22/2018.            Pending Culture Results      "No orders found from 2018 to 2018.            Pending Results Instructions     If you had any lab results that were not finalized at the time of your Discharge, you can call the ED Lab Result RN at [phone]. You will be contacted by this team for any positive Lab results or changes in treatment. The nurses are available 7 days a week from 10A to 6:30P.  You can leave a message 24 hours per day and they will return your call.        Thank you for choosing [facility]       Thank you for choosing [facility] for your care. Our goal is always to provide you with excellent care. Hearing back from our patients is one way we can continue to improve our services. Please take a few minutes to complete the written survey that you may receive in the mail after you visit with us. Thank you!        [facility] [employer] Information     [facility] lets you send messages to your doctor, view your test results, renew your prescriptions, schedule appointments and more. To sign up, go to [email or web address] . Click on \"Log in\" on the left side of the screen, which will take you to the Welcome page. Then click on \"Sign up Now\" on the right side of the page.     You will be asked to enter the access code listed below, as well as some personal information. Please follow the directions to create your username and password.     Your access code is: [email or web address]  Expires: 2018  2:02 PM     Your access code will  in 90 days. If you need help or a new code, please call your [facility] clinic or [phone].        Care EveryWhere ID     This is your Care EveryWhere ID. This could be used by other organizations to access your [facility] medical records  [id number]        Equal Access to Services     ELKIN PELAEZ : Hadanastasiia Funk, ronaldo garibay, tona bustamante. So [facility] [phone].    ATENCIÓN: Si habla español, tiene a reaves disposición servicios gratuitos de " asistencia lingüística. Lynda al [phone].    We comply with applicable federal civil rights laws and [place] laws. We do not discriminate on the basis of race, color, national origin, age, disability, sex, sexual orientation, or gender identity.            After Visit Summary       This is your record. Keep this with you and show to your community pharmacist(s) and doctor(s) at your next visit.

## 2018-06-21 NOTE — ED AVS SNAPSHOT
[facility] Emergency Department    [address] [place] [address]    Phone:  [phone]    Fax:  [phone]                                       Lani Barry   MRN: [id number]    Department:  [facility] Emergency Department   Date of Visit:  6/21/2018           After Visit Summary Signature Page     I have received my discharge instructions, and my questions have been answered. I have discussed any challenges I see with this plan with the nurse or doctor.    ..........................................................................................................................................  Patient/Patient Representative Signature      ..........................................................................................................................................  Patient Representative Print Name and Relationship to Patient    ..................................................               ................................................  Date                                            Time    ..........................................................................................................................................  Reviewed by Signature/Title    ...................................................              ..............................................  Date                                                            Time

## 2018-06-22 NOTE — ED PROVIDER NOTES
History     Chief Complaint   Patient presents with     Post-op Problem     HPI  Lani Barry is a 63 year old female who presents to the ER with concerns about left arm pain and hand swelling. She states she had surgery to release the nerve in her elbow 2 days ago per Dr. Parks. She has been keeping the arm elevated and in the sling but despite this the hand is increasing swollen and painful. She states she called the phone triage nurse you told her to come to the ER to be checked. She states that otherwise she is doing well post surgery.      Problem List:    Patient Active Problem List    Diagnosis Date Noted     S/P cubital tunnel release 05/18/2018     Priority: Medium     8/30/2016 left side by Dr. Parks       Numbness and tingling in left hand 05/18/2018     Priority: Medium     Advanced directives, counseling/discussion 10/25/2017     Priority: Medium     Will bring in a copy       Morbid obesity (H) 10/25/2017     Priority: Medium     Cubital tunnel syndrome, left 08/18/2016     Priority: Medium     Lesion of left ulnar nerve 08/16/2016     Priority: Medium     Gastroesophageal reflux disease without esophagitis 08/16/2016     Priority: Medium     Hyperlipidemia LDL goal <130 08/16/2016     Priority: Medium        Past Medical History:    Past Medical History:   Diagnosis Date     Arthritis      Cerebral infarction (H)      Morbid obesity (H) 10/25/2017       Past Surgical History:    Past Surgical History:   Procedure Laterality Date     INJECT FACET JOINT N/A 7/10/2017    Procedure: INJECT FACET JOINT;  Lumbar 3-4 Bilateral Facet joint injection;  Surgeon: Misha Rae MD;  [facility]     RELEASE ULNAR NERVE (ELBOW) Left 8/30/2016    Procedure: RELEASE ULNAR NERVE (ELBOW);  Surgeon: Steve Parks DO;  Location: [facility]     TRANSPOSITION ULNAR NERVE (ELBOW) Left 6/19/2018    Procedure: TRANSPOSITION ULNAR NERVE (ELBOW);  Left ulnar nerve decompression with transposition;   Surgeon: Steve Parks DO;  Location:  OR       Family History:    Family History   Problem Relation Age of Onset     Cancer Sister      Cerebrovascular Disease Sister      Diabetes Paternal Uncle        Social History:  Marital Status:   [2]  Social History   Substance Use Topics     Smoking status: Current Some Day Smoker     Packs/day: 0.10     Types: Cigars     Smokeless tobacco: Never Used     Alcohol use No        Medications:      acetaminophen (TYLENOL) 325 MG tablet   ASPIRIN PO   DIPHENHYDRAMINE HCL PO   fluocinolone (SYNALAR) 0.01 % solution   methocarbamol (ROBAXIN) 750 MG tablet   oxyCODONE IR (ROXICODONE) 5 MG tablet   Rosuvastatin Calcium (CRESTOR PO)   varenicline (CHANTIX) 1 MG tablet         Review of Systems   Musculoskeletal:        Left hand swelling and pain postoperatively with left arm and a splint from her mid hand to above her left elbow.   All other systems reviewed and are negative.      Physical Exam   BP: 129/63  Pulse: 67  Temp: 98.3  F (36.8  C)  Resp: 20  Weight: 94.8 kg (209 lb 1.6 oz)  SpO2: 99 %      Physical Exam   Constitutional: She appears well-developed and well-nourished. She appears distressed.   Musculoskeletal:   Patient with a splint with Ace wrap to the left arm and mid upper arm.  Patient with a history for recent left elbow surgery.  Patient is splinted and in a arm sling.  The hand is mildly discolored  as compared to the right hand and with significant swelling as compared to the right hand.  The Ace wrap surrounding the splint was removed and she had immediate relief of pain to the left hand.  The arm was placed on a elevated pillow and a rechecked in approximately 15 minutes with marked improvement in the swelling to the left hand.  She states that she is feeling much better.  The arm was rewrapped with Ace wrap but in a less tight manner.  She was rechecked approximately half an hour later and continued to feel well with continued improvement  in the swelling of the left hand.  He was placed back into the left arm sling and instructed on the use of the sling and left arm splint with Ace wrap.   Nursing note and vitals reviewed.    The area of the left elbow was not visualized directly as she had a splint in place.  However, there was no sign of bleeding through the splint material or sign of drainage or infection to the area above or below the splint.  ED Course     ED Course     Procedures               Critical Care time:  none               Assessments & Plan (with Medical Decision Making)  63-year-old female to the year secondary complaints of left hand swelling and pain postoperatively.  Exam findings consistent with splint on the left arm with surrounding Ace wrap.  She had significant left hand swelling consistent with her complaints and the Ace wrap was loosened with marked improvement in her pain and swelling.  This was read wrapped around the splinting on the left arm and again the patient was monitored and continued to have improvement in her swelling and pain and so her arm was placed back into the sling and patient was discharged to home with instructions on the use of the sling and care of the splint.  She plans follow-up with Dr. Parks as previously planned.     I have reviewed the nursing notes.    I have reviewed the findings, diagnosis, plan and need for follow up with the patient.       Discharge Medication List as of 6/21/2018 11:31 PM               I verbally discussed the findings of the evaluation today in the ER. I have verbally discussed with Lani the suggested treatment(s) as described in the discharge instructions and handouts.       I have verbally suggested she follow-up in her clinic or return to the ER for increased symptoms. See the follow-up recommendations documented  in the after visit summary in this visit's EPIC chart.    Final diagnoses:   Postoperative pain - Left arm with swelling to hand       6/21/2018    [facility] EMERGENCY DEPARTMENT     James Kaur, DO  06/22/18 0228

## 2018-06-22 NOTE — DISCHARGE INSTRUCTIONS
Please read and follow the handout(s) instructions. Return, if needed, for increased or worsening symptoms and as directed by the handout(s).    Keep the arm elevated to help the swelling.     I'm glad you are feeling improved.     Follow-up with Dr. Parks as planned.    Electronically signed, James Kaur DO

## 2018-06-22 NOTE — TELEPHONE ENCOUNTER
"  Reason for Disposition    [1] SEVERE pain (e.g., excruciating, unable to use hand at all) AND [2] not improved after 2 hours of pain medicine    Additional Information    Negative: [1] Similar pain previously AND [2] it was from \"heart attack\"    Negative: [1] Similar pain previously AND [2] it was from \"angina\" AND [3] not relieved by nitroglycerin    Negative: Sounds like a life-threatening emergency to the triager    Negative: Followed a hand or wrist injury    Negative: Chest pain    Negative: Caused by an animal bite    Negative: Caused by a human bite    Negative: Wound looks infected    Negative: Finger pain is main symptom    Negative: Arm pain is main symptom    Negative: [1] Swollen joint AND [2] fever    Negative: Patient sounds very sick or weak to the triager    Negative: [1] Red area or streak AND [2] fever    Protocols used: HAND AND WRIST PAIN-ADULT-AH    Patient reports ulnar surgery this past Tuesday.  She reports that her arm is wrapped and in a sling, and that she has been experiencing left hand swelling since coming home from surgery.  Patient reports that she has had no decrease in swelling with elevating the extremity nor with using ice.  Advised that patient be seen within 4 hours per guideline.  Patient with plans to go to [place] ED.    Isabel Green RN  [facility]    "

## 2018-06-29 ENCOUNTER — OFFICE VISIT (OUTPATIENT)
Dept: ORTHOPEDICS | Facility: CLINIC | Age: 63
End: 2018-06-29
Payer: MEDICARE

## 2018-06-29 VITALS
DIASTOLIC BLOOD PRESSURE: 70 MMHG | HEIGHT: 61 IN | BODY MASS INDEX: 37.76 KG/M2 | TEMPERATURE: 98 F | WEIGHT: 200 LBS | SYSTOLIC BLOOD PRESSURE: 103 MMHG

## 2018-06-29 DIAGNOSIS — G56.22 CUBITAL TUNNEL SYNDROME, LEFT: Primary | ICD-10-CM

## 2018-06-29 PROCEDURE — 29105 APPLICATION LONG ARM SPLINT: CPT | Mod: 58 | Performed by: ORTHOPAEDIC SURGERY

## 2018-06-29 PROCEDURE — 99024 POSTOP FOLLOW-UP VISIT: CPT | Performed by: ORTHOPAEDIC SURGERY

## 2018-06-29 ASSESSMENT — PAIN SCALES - GENERAL: PAINLEVEL: NO PAIN (0)

## 2018-06-29 NOTE — MR AVS SNAPSHOT
"              After Visit Summary   6/29/2018    Lani Barry    MRN: [id number]           Patient Information     Date Of Birth          1955        Visit Information        Provider Department      6/29/2018 1:20 PM Steve Parks,  [facility]        Today's Diagnoses     Cubital tunnel syndrome, left    -  1       Follow-ups after your visit        Your next 10 appointments already scheduled     Jul 12, 2018 10:00 AM CDT   New Visit with Luigi Wong MD   [facility] ([facility])    [address]  [place] [address] [phone]              Who to contact     If you have questions or need follow up information about today's clinic visit or your schedule please contact [facility] directly at [phone].  Normal or non-critical lab and imaging results will be communicated to you by MyChart, letter or phone within 4 business days after the clinic has received the results. If you do not hear from us within 7 days, please contact the clinic through MyChart or phone. If you have a critical or abnormal lab result, we will notify you by phone as soon as possible.  Submit refill requests through [employer] or call your pharmacy and they will forward the refill request to us. Please allow 3 business days for your refill to be completed.          Additional Information About Your Visit        Care EveryWhere ID     This is your Care EveryWhere ID. This could be used by other organizations to access your [facility] medical records  [id number]        Your Vitals Were     Temperature Height BMI (Body Mass Index)             98  F (36.7  C) (Temporal) 1.543 m (5' 0.75\") 38.1 kg/m2          Blood Pressure from Last 3 Encounters:   06/29/18 103/70   06/21/18 129/63   06/19/18 (!) 90/38    Weight from Last 3 Encounters:   06/29/18 90.7 kg (200 lb)   06/21/18 94.8 kg (209 lb 1.6 oz)   06/13/18 90.9 kg (200 lb 6.4 oz) "              We Performed the Following     APPLY LONG ARM SPLINT        Primary Care Provider Office Phone # Fax #    Ciprianojosefina Arias, [phone]       [address] [place] [address]        Equal Access to Services     ELKIN PELAEZ : Hadanastasiia weaver hadjonnao Soomaali, waaxda luqadaha, qaybta kaalmada adeegyada, waxanitha denisein hayaan olivnchacho fung wil acosta. So [facility] [phone].    ATENCIÓN: Si habla español, tiene a reaves disposición servicios gratuitos de asistencia lingüística. Llame al [phone].    We comply with applicable federal civil rights laws and [place] laws. We do not discriminate on the basis of race, color, national origin, age, disability, sex, sexual orientation, or gender identity.            Thank you!     Thank you for choosing [facility]  for your care. Our goal is always to provide you with excellent care. Hearing back from our patients is one way we can continue to improve our services. Please take a few minutes to complete the written survey that you may receive in the mail after your visit with us. Thank you!             Your Updated Medication List - Protect others around you: Learn how to safely use, store and throw away your medicines at www.disposemymeds.org.          This list is accurate as of 6/29/18  1:36 PM.  Always use your most recent med list.                   Brand Name Dispense Instructions for use Diagnosis    acetaminophen 325 MG tablet    TYLENOL    100 tablet    Take 3 tablets (975 mg) by mouth every 8 hours as needed for other (mild pain)    S/P cubital tunnel release       ASPIRIN PO      Take 81 mg by mouth        CRESTOR PO      Take 40 mg by mouth        DIPHENHYDRAMINE HCL PO      Take 50 mg by mouth every 6 hours as needed        fluocinolone 0.01 % solution    SYNALAR    60 mL    APPLY TOPICALLY TWICE A DAY    Intrinsic eczema       methocarbamol 750 MG tablet    ROBAXIN    60 tablet    Take 1 tablet (750 mg) by mouth  every 6 hours as needed for muscle spasms (muscle spasm)    S/P cubital tunnel release       oxyCODONE IR 5 MG tablet    ROXICODONE    30 tablet    Take 1-2 tablets (5-10 mg) by mouth every 3 hours as needed for pain or other (Moderate to Severe)    S/P cubital tunnel release       varenicline 1 MG tablet    CHANTIX    60 tablet    Take 1 tablet (1 mg) by mouth 2 times daily    Tobacco abuse

## 2018-06-29 NOTE — LETTER
6/29/2018         RE: Lani Barry  [address] [address]        Dear Colleague,    Thank you for referring your patient, Lani Barry, to the [facility]. Please see a copy of my visit note below.    Orthopedic Clinic Post-Operative Note    CHIEF COMPLAINT:   Chief Complaint   Patient presents with     Surgical Followup     DOS:6/19/2018~ Left ulnar nerve decompression at the elbow with subcutaneous transposition. ~10 days postop       HISTORY OF PRESENT ILLNESS  Pain and numbness is better than presurgery.  No other issues.    Patient's past medical, surgical, social and family histories reviewed.     Past Medical History:   Diagnosis Date     Arthritis      Cerebral infarction (H)      Morbid obesity (H) 10/25/2017       Past Surgical History:   Procedure Laterality Date     INJECT FACET JOINT N/A 7/10/2017    Procedure: INJECT FACET JOINT;  Lumbar 3-4 Bilateral Facet joint injection;  Surgeon: Misha Rae MD;  [facility]     RELEASE ULNAR NERVE (ELBOW) Left 8/30/2016    Procedure: RELEASE ULNAR NERVE (ELBOW);  Surgeon: Steve Parks DO;  [facility]: [facility]     TRANSPOSITION ULNAR NERVE (ELBOW) Left 6/19/2018    Procedure: TRANSPOSITION ULNAR NERVE (ELBOW);  Left ulnar nerve decompression with transposition;  Surgeon: Steve Parks DO;  Location: [facility]       Medications:    Current Outpatient Prescriptions on File Prior to Visit:  Rosuvastatin Calcium (CRESTOR PO) Take 40 mg by mouth    varenicline (CHANTIX) 1 MG tablet Take 1 tablet (1 mg) by mouth 2 times daily   acetaminophen (TYLENOL) 325 MG tablet Take 3 tablets (975 mg) by mouth every 8 hours as needed for other (mild pain)   ASPIRIN PO Take 81 mg by mouth   DIPHENHYDRAMINE HCL PO Take 50 mg by mouth every 6 hours as needed   fluocinolone (SYNALAR) 0.01 % solution APPLY TOPICALLY TWICE A DAY   methocarbamol (ROBAXIN) 750 MG tablet Take 1 tablet (750 mg) by mouth every 6 hours as needed  "for muscle spasms (muscle spasm)   oxyCODONE IR (ROXICODONE) 5 MG tablet Take 1-2 tablets (5-10 mg) by mouth every 3 hours as needed for pain or other (Moderate to Severe)     No current facility-administered medications on file prior to visit.     Allergies   Allergen Reactions     Bees        Social History     Occupational History     Not on file.     Social History Main Topics     Smoking status: Current Some Day Smoker     Packs/day: 0.10     Types: Cigars     Smokeless tobacco: Never Used     Alcohol use No     Drug use: No     Sexual activity: Yes     Partners: Male       Family History   Problem Relation Age of Onset     Cancer Sister      Cerebrovascular Disease Sister      Diabetes Paternal Uncle        REVIEW OF SYSTEMS  General: negative for, night sweats, dizziness, fatigue  Resp: No shortness of breath and no cough  CV: negative for chest pain, syncope or near-syncope  GI: negative for nausea, vomiting and diarrhea  : negative for dysuria and hematuria  Musculoskeletal: as above  Neurologic: negative for syncope   Hematologic: negative for bleeding disorder    Physical Exam:  Vitals: /70 (BP Location: Right arm, Patient Position: Chair, Cuff Size: Adult Large)  Temp 98  F (36.7  C) (Temporal)  Ht 5' 0.75\" (1.543 m)  Wt 200 lb (90.7 kg)  BMI 38.1 kg/m2  BMI= Body mass index is 38.1 kg/(m^2).  Constitutional: healthy, alert and no acute distress   Psychiatric: mentation appears normal and affect normal/bright  NEURO: no focal deficits  SKIN: .healing well, well approximated skin edges, without signs of infection including no erythema, incision breakdown or purlent drainage  JOINT/EXTREMITIES: Fingers warm and dry with good cap refill.  Flexors and extensors are grossly intact.  Full elbow motion gently tested.  Some numbness in the small and ring fingertips.  GAIT: not tested     Diagnostic Modalities:  None today.  Independent visualization of the images was performed.      Impression: "   Chief Complaint   Patient presents with     Surgical Followup     DOS:6/19/2018~ Left ulnar nerve decompression at the elbow with subcutaneous transposition. ~10 days postop   Doing well.    Plan:   On today's visit a well padded Ortho-Glass  long arm splint was applied to the left upper extremity. The neurovascular status is unchanged after application. Cast/splint care was discussed.  Options discussed.  We discussed splinting versus not splinting.  If she could guarantee me she would not use the arm to lift push or pull we could just continue a sling.  After reviewing that she opted to have a splint placed.  Anticipate starting occupational therapy on follow-up.  No lifting pushing or pulling.  Return to clinic 2, week(s), or sooner as needed for changes.    Re-x-ray on return: No    Madan Parks D.O.    Again, thank you for allowing me to participate in the care of your patient.        Sincerely,        Steve Parks, DO

## 2018-06-29 NOTE — PROGRESS NOTES
Orthopedic Clinic Post-Operative Note    CHIEF COMPLAINT:   Chief Complaint   Patient presents with     Surgical Followup     DOS:6/19/2018~ Left ulnar nerve decompression at the elbow with subcutaneous transposition. ~10 days postop       HISTORY OF PRESENT ILLNESS  Pain and numbness is better than presurgery.  No other issues.    Patient's past medical, surgical, social and family histories reviewed.     Past Medical History:   Diagnosis Date     Arthritis      Cerebral infarction (H)      Morbid obesity (H) 10/25/2017       Past Surgical History:   Procedure Laterality Date     INJECT FACET JOINT N/A 7/10/2017    Procedure: INJECT FACET JOINT;  Lumbar 3-4 Bilateral Facet joint injection;  Surgeon: Misha Rae MD;  [facility]     RELEASE ULNAR NERVE (ELBOW) Left 8/30/2016    Procedure: RELEASE ULNAR NERVE (ELBOW);  Surgeon: Steve Parks DO;  [facility]     TRANSPOSITION ULNAR NERVE (ELBOW) Left 6/19/2018    Procedure: TRANSPOSITION ULNAR NERVE (ELBOW);  Left ulnar nerve decompression with transposition;  Surgeon: Steve Parks DO;  Location: [facility]       Medications:    Current Outpatient Prescriptions on File Prior to Visit:  Rosuvastatin Calcium (CRESTOR PO) Take 40 mg by mouth    varenicline (CHANTIX) 1 MG tablet Take 1 tablet (1 mg) by mouth 2 times daily   acetaminophen (TYLENOL) 325 MG tablet Take 3 tablets (975 mg) by mouth every 8 hours as needed for other (mild pain)   ASPIRIN PO Take 81 mg by mouth   DIPHENHYDRAMINE HCL PO Take 50 mg by mouth every 6 hours as needed   fluocinolone (SYNALAR) 0.01 % solution APPLY TOPICALLY TWICE A DAY   methocarbamol (ROBAXIN) 750 MG tablet Take 1 tablet (750 mg) by mouth every 6 hours as needed for muscle spasms (muscle spasm)   oxyCODONE IR (ROXICODONE) 5 MG tablet Take 1-2 tablets (5-10 mg) by mouth every 3 hours as needed for pain or other (Moderate to Severe)     No current facility-administered medications on file prior  "to visit.     Allergies   Allergen Reactions     Bees        Social History     Occupational History     Not on file.     Social History Main Topics     Smoking status: Current Some Day Smoker     Packs/day: 0.10     Types: Cigars     Smokeless tobacco: Never Used     Alcohol use No     Drug use: No     Sexual activity: Yes     Partners: Male       Family History   Problem Relation Age of Onset     Cancer Sister      Cerebrovascular Disease Sister      Diabetes Paternal Uncle        REVIEW OF SYSTEMS  General: negative for, night sweats, dizziness, fatigue  Resp: No shortness of breath and no cough  CV: negative for chest pain, syncope or near-syncope  GI: negative for nausea, vomiting and diarrhea  : negative for dysuria and hematuria  Musculoskeletal: as above  Neurologic: negative for syncope   Hematologic: negative for bleeding disorder    Physical Exam:  Vitals: /70 (BP Location: Right arm, Patient Position: Chair, Cuff Size: Adult Large)  Temp 98  F (36.7  C) (Temporal)  Ht 5' 0.75\" (1.543 m)  Wt 200 lb (90.7 kg)  BMI 38.1 kg/m2  BMI= Body mass index is 38.1 kg/(m^2).  Constitutional: healthy, alert and no acute distress   Psychiatric: mentation appears normal and affect normal/bright  NEURO: no focal deficits  SKIN: .healing well, well approximated skin edges, without signs of infection including no erythema, incision breakdown or purlent drainage  JOINT/EXTREMITIES: Fingers warm and dry with good cap refill.  Flexors and extensors are grossly intact.  Full elbow motion gently tested.  Some numbness in the small and ring fingertips.  GAIT: not tested     Diagnostic Modalities:  None today.  Independent visualization of the images was performed.      Impression:   Chief Complaint   Patient presents with     Surgical Followup     DOS:6/19/2018~ Left ulnar nerve decompression at the elbow with subcutaneous transposition. ~10 days postop   Doing well.    Plan:   On today's visit a well padded " Ortho-Glass  long arm splint was applied to the left upper extremity. The neurovascular status is unchanged after application. Cast/splint care was discussed.  Options discussed.  We discussed splinting versus not splinting.  If she could guarantee me she would not use the arm to lift push or pull we could just continue a sling.  After reviewing that she opted to have a splint placed.  Anticipate starting occupational therapy on follow-up.  No lifting pushing or pulling.  Return to clinic 2, week(s), or sooner as needed for changes.    Re-x-ray on return: No    Madan Parks D.O.

## 2018-07-11 ENCOUNTER — OFFICE VISIT (OUTPATIENT)
Dept: ORTHOPEDICS | Facility: OTHER | Age: 63
End: 2018-07-11
Payer: MEDICARE

## 2018-07-11 VITALS
HEIGHT: 61 IN | DIASTOLIC BLOOD PRESSURE: 72 MMHG | BODY MASS INDEX: 38.33 KG/M2 | WEIGHT: 203 LBS | SYSTOLIC BLOOD PRESSURE: 108 MMHG

## 2018-07-11 DIAGNOSIS — G56.22 CUBITAL TUNNEL SYNDROME, LEFT: Primary | ICD-10-CM

## 2018-07-11 PROCEDURE — 99024 POSTOP FOLLOW-UP VISIT: CPT | Performed by: ORTHOPAEDIC SURGERY

## 2018-07-11 ASSESSMENT — PAIN SCALES - GENERAL: PAINLEVEL: NO PAIN (0)

## 2018-07-11 NOTE — MR AVS SNAPSHOT
"              After Visit Summary   7/11/2018    Lani Barry    MRN: [id number]           Patient Information     Date Of Birth          1955        Visit Information        Provider Department      7/11/2018 1:30 PM Steve Parks,  [facility]        Today's Diagnoses     Cubital tunnel syndrome, left    -  1       Follow-ups after your visit        Additional Services     OCCUPATIONAL THERAPY REFERRAL       *This therapy referral will be filtered to a centralized scheduling office at [facility] and the patient will receive a call to schedule an appointment at a [facility] location most convenient for them. *     [facility] provides Occupational Therapy evaluation and treatment and many specialty services across the [facility] system.  If requesting a specialty program, please choose from the list below.    If you have not heard from the scheduling office within 2 business days, please call [phone] for all locations, with the exception of [place], please call [phone] and [facility], please call [phone]    Treatment: Evaluation & Treatment  Special Instructions/Modalities: left elbow -please work ROM- no restaince  Special Programs:     Please be aware that coverage of these services is subject to the terms and limitations of your health insurance plan.  Call member services at your health plan with any benefit or coverage questions.      **Note to Provider:  If you are referring outside of [facility] for the therapy appointment, please list the name of the location in the \"special instructions\" above, print the referral and give to the patient to schedule the appointment.                  Your next 10 appointments already scheduled     Jul 12, 2018 10:00 AM CDT   New Visit with Luigi Wong MD   [facility] [place] ([facility] [place])    [address]  [place] [address] " "  [phone]              Who to contact     If you have questions or need follow up information about today's clinic visit or your schedule please contact [facility] directly at [phone].  Normal or non-critical lab and imaging results will be communicated to you by MyChart, letter or phone within 4 business days after the clinic has received the results. If you do not hear from us within 7 days, please contact the clinic through MyChart or phone. If you have a critical or abnormal lab result, we will notify you by phone as soon as possible.  Submit refill requests through [employer] or call your pharmacy and they will forward the refill request to us. Please allow 3 business days for your refill to be completed.          Additional Information About Your Visit        Care EveryWhere ID     This is your Care EveryWhere ID. This could be used by other organizations to access your [place] medical records  [id number]        Your Vitals Were     Height BMI (Body Mass Index)                1.543 m (5' 0.75\") 38.67 kg/m2           Blood Pressure from Last 3 Encounters:   07/11/18 108/72   06/29/18 103/70   06/21/18 129/63    Weight from Last 3 Encounters:   07/11/18 92.1 kg (203 lb)   06/29/18 90.7 kg (200 lb)   06/21/18 94.8 kg (209 lb 1.6 oz)              We Performed the Following     OCCUPATIONAL THERAPY REFERRAL        Primary Care Provider Office Phone # Fax #    Cipriano Miguel FisherDO monika [phone]       [address] [place] [address]        Equal Access to Services     ELKIN PELAEZ AH: Hadii twila weaver hadasho Sokavitaali, waaxda luqadaha, qaybta kaalmada tona sam [facility] acosta. So [facility] [phone].    ATENCIÓN: Si habla español, tiene a reaves disposición servicios gratuitos de asistencia lingüística. Llame al [phone].    We comply with applicable federal civil rights laws and [place] laws. We do not discriminate on the basis of race, " color, national origin, age, disability, sex, sexual orientation, or gender identity.            Thank you!     Thank you for choosing [facility] [place]  for your care. Our goal is always to provide you with excellent care. Hearing back from our patients is one way we can continue to improve our services. Please take a few minutes to complete the written survey that you may receive in the mail after your visit with us. Thank you!             Your Updated Medication List - Protect others around you: Learn how to safely use, store and throw away your medicines at www.disposemymeds.org.          This list is accurate as of 7/11/18  2:39 PM.  Always use your most recent med list.                   Brand Name Dispense Instructions for use Diagnosis    acetaminophen 325 MG tablet    TYLENOL    100 tablet    Take 3 tablets (975 mg) by mouth every 8 hours as needed for other (mild pain)    S/P cubital tunnel release       ASPIRIN PO      Take 81 mg by mouth        CRESTOR PO      Take 40 mg by mouth        DIPHENHYDRAMINE HCL PO      Take 50 mg by mouth every 6 hours as needed        fluocinolone 0.01 % solution    SYNALAR    60 mL    APPLY TOPICALLY TWICE A DAY    Intrinsic eczema       methocarbamol 750 MG tablet    ROBAXIN    60 tablet    Take 1 tablet (750 mg) by mouth every 6 hours as needed for muscle spasms (muscle spasm)    S/P cubital tunnel release       oxyCODONE IR 5 MG tablet    ROXICODONE    30 tablet    Take 1-2 tablets (5-10 mg) by mouth every 3 hours as needed for pain or other (Moderate to Severe)    S/P cubital tunnel release       varenicline 1 MG tablet    CHANTIX    60 tablet    Take 1 tablet (1 mg) by mouth 2 times daily    Tobacco abuse

## 2018-07-11 NOTE — LETTER
7/11/2018         RE: Lani Barry  [address] [address]        Dear Colleague,    Thank you for referring your patient, Lani Barry, to the [facility]. Please see a copy of my visit note below.    Orthopedic Clinic Post-Operative Note    CHIEF COMPLAINT:   Chief Complaint   Patient presents with     Surgical Followup     DOS:6/19/2018~ Left ulnar nerve decompression at the elbow with subcutaneous transposition       HISTORY OF PRESENT ILLNESS  No pain.  No splint issues.  Fingertips in the small and ring finger feel better.  Some numbness along the dorsal ulnar aspect of the hand.    Patient's past medical, surgical, social and family histories reviewed.     Past Medical History:   Diagnosis Date     Arthritis      Cerebral infarction (H)      Morbid obesity (H) 10/25/2017       Past Surgical History:   Procedure Laterality Date     INJECT FACET JOINT N/A 7/10/2017    Procedure: INJECT FACET JOINT;  Lumbar 3-4 Bilateral Facet joint injection;  Surgeon: Mihsa Rae MD;  Location: [facility] OR     RELEASE ULNAR NERVE (ELBOW) Left 8/30/2016    Procedure: RELEASE ULNAR NERVE (ELBOW);  Surgeon: Steve Parks DO;  Location: [facility] OR     TRANSPOSITION ULNAR NERVE (ELBOW) Left 6/19/2018    Procedure: TRANSPOSITION ULNAR NERVE (ELBOW);  Left ulnar nerve decompression with transposition;  Surgeon: Steve Parks DO;  Location: [facility] OR       Medications:    Current Outpatient Prescriptions on File Prior to Visit:  acetaminophen (TYLENOL) 325 MG tablet Take 3 tablets (975 mg) by mouth every 8 hours as needed for other (mild pain)   ASPIRIN PO Take 81 mg by mouth   DIPHENHYDRAMINE HCL PO Take 50 mg by mouth every 6 hours as needed   fluocinolone (SYNALAR) 0.01 % solution APPLY TOPICALLY TWICE A DAY   methocarbamol (ROBAXIN) 750 MG tablet Take 1 tablet (750 mg) by mouth every 6 hours as needed for muscle spasms (muscle spasm)   oxyCODONE IR (ROXICODONE) 5 MG tablet Take  "1-2 tablets (5-10 mg) by mouth every 3 hours as needed for pain or other (Moderate to Severe)   Rosuvastatin Calcium (CRESTOR PO) Take 40 mg by mouth    varenicline (CHANTIX) 1 MG tablet Take 1 tablet (1 mg) by mouth 2 times daily     No current facility-administered medications on file prior to visit.     Allergies   Allergen Reactions     Bees        Social History     Occupational History     Not on file.     Social History Main Topics     Smoking status: Current Some Day Smoker     Packs/day: 0.10     Types: Cigars     Smokeless tobacco: Never Used     Alcohol use No     Drug use: No     Sexual activity: Yes     Partners: Male       Family History   Problem Relation Age of Onset     Cancer Sister      Cerebrovascular Disease Sister      Diabetes Paternal Uncle        REVIEW OF SYSTEMS  General: negative for, night sweats, dizziness, fatigue  Resp: No shortness of breath and no cough  CV: negative for chest pain, syncope or near-syncope  GI: negative for nausea, vomiting and diarrhea  : negative for dysuria and hematuria  Musculoskeletal: as above  Neurologic: negative for syncope   Hematologic: negative for bleeding disorder    Physical Exam:  Vitals: /72  Ht 5' 0.75\" (1.543 m)  Wt 203 lb (92.1 kg)  BMI 38.67 kg/m2  BMI= Body mass index is 38.67 kg/(m^2).  Constitutional: healthy, alert and no acute distress   Psychiatric: mentation appears normal and affect normal/bright  NEURO: no focal deficits  SKIN: .well healed, no erythema, no incision breakdown and no drainage.  JOINT/EXTREMITIES: Expected stiffness although near full motion to the elbow.  Fingers are warm and dry good cap refill.  Ulnar motor is intact  GAIT: not tested     Diagnostic Modalities:  None today.  Independent visualization of the images was performed.      Impression:   Chief Complaint   Patient presents with     Surgical Followup     DOS:6/19/2018~ Left ulnar nerve decompression at the elbow with subcutaneous transposition "   Doing as expected.    Plan:   Recommend sling.  No lifting pushing or pulling.  She should work on gentle range of motion throughout the day.  Also recommended some occupational therapy to help work on range of motion with no resistance.  Return to clinic 4-6 , week(s), or sooner as needed for changes.    Re-x-ray on return: No    Madan Parks D.O.    Again, thank you for allowing me to participate in the care of your patient.        Sincerely,        Steve Parks, DO

## 2018-07-12 ENCOUNTER — OFFICE VISIT (OUTPATIENT)
Dept: PALLIATIVE MEDICINE | Facility: CLINIC | Age: 63
End: 2018-07-12
Payer: MEDICARE

## 2018-07-12 ENCOUNTER — TELEPHONE (OUTPATIENT)
Dept: PALLIATIVE MEDICINE | Facility: CLINIC | Age: 63
End: 2018-07-12

## 2018-07-12 VITALS
SYSTOLIC BLOOD PRESSURE: 115 MMHG | BODY MASS INDEX: 38.86 KG/M2 | DIASTOLIC BLOOD PRESSURE: 83 MMHG | WEIGHT: 204 LBS | HEART RATE: 69 BPM

## 2018-07-12 DIAGNOSIS — M47.817 LUMBOSACRAL SPONDYLOSIS WITHOUT MYELOPATHY: Primary | ICD-10-CM

## 2018-07-12 DIAGNOSIS — M79.18 MYOFASCIAL MUSCLE PAIN: ICD-10-CM

## 2018-07-12 PROCEDURE — 99205 OFFICE O/P NEW HI 60 MIN: CPT | Performed by: PAIN MEDICINE

## 2018-07-12 ASSESSMENT — PAIN SCALES - GENERAL: PAINLEVEL: MODERATE PAIN (5)

## 2018-07-12 NOTE — TELEPHONE ENCOUNTER
Order received from Dr. Wong for Repeat Lumbar RFA. Routing for insurance check.       Loulou Valle    [facility]

## 2018-07-12 NOTE — MR AVS SNAPSHOT
After Visit Summary   7/12/2018    Lani Barry    MRN: [id number]           Patient Information     Date Of Birth          1955        Visit Information        Provider Department      7/12/2018 10:00 AM Luigi Wong MD [facility] [place]        Today's Diagnoses     Lumbosacral spondylosis without myelopathy    -  1      Care Instructions     - Further procedures recommended:   - Would repeat Lumbar RFA will need to get records first.  - Medication Management:    - reasonable to continue as needed acetaminophen and motrin   - Physical Therapy: Would consider  Pain PHYSICAL THERAPY    - Urine toxicology screen today: no    - Follow up: For procedure    ----------------------------------------------------------------  Nurse Triage line:  [phone]   Call this number with any questions or concerns. You may leave a detailed message anytime. Calls are typically returned Monday through Friday between 8 AM and 4:30 PM. We usually get back to you within 2 business days depending on the issue/request.       Medication refills:    For non-narcotic medications, call your pharmacy directly to request a refill. The pharmacy will contact the [facility] for authorization. Please allow 3-4 days for these refills to be processed.     For narcotic refills, call the nurse triage line or send a [employer] message. Please contact us 7-10 days before your refill is due. The message MUST include the name of the specific medication(s) requested and how you would like to receive the prescription(s). The options are as follows:    Pain Clinic staff can mail the prescription to your pharmacy. Please tell us the name of the pharmacy.    You may pick the prescription up at the Pain Clinic (tell us the location) or during a clinic visit with your pain provider    Pain Clinic staff can deliver the prescription to the [place] pharmacy in the clinic building. Please tell us the location.       Scheduling number: [phone].  Call this number to schedule or change appointments.    We believe regular attendance is key to your success in our program.    Any time you are unable to keep your appointment we ask that you call us at least 24 hours in advance to let us know. This will allow us to offer the appointment time to another patient.               Follow-ups after your visit        Additional Services     PAIN INJECTION EVAL/TREAT/FOLLOW UP                 Who to contact     If you have questions or need follow up information about today's clinic visit or your schedule please contact [facility] directly at [phone].  Normal or non-critical lab and imaging results will be communicated to you by MyChart, letter or phone within 4 business days after the clinic has received the results. If you do not hear from us within 7 days, please contact the clinic through MyChart or phone. If you have a critical or abnormal lab result, we will notify you by phone as soon as possible.  Submit refill requests through [employer] or call your pharmacy and they will forward the refill request to us. Please allow 3 business days for your refill to be completed.          Additional Information About Your Visit        Care EveryWhere ID     This is your Care EveryWhere ID. This could be used by other organizations to access your [place] medical records  [id number]        Your Vitals Were     Pulse BMI (Body Mass Index)                69 38.86 kg/m2           Blood Pressure from Last 3 Encounters:   07/12/18 115/83   07/11/18 108/72   06/29/18 103/70    Weight from Last 3 Encounters:   07/12/18 92.5 kg (204 lb)   07/11/18 92.1 kg (203 lb)   06/29/18 90.7 kg (200 lb)              We Performed the Following     PAIN INJECTION EVAL/TREAT/FOLLOW UP        Primary Care Provider Office Phone # Fax #    Cipriano Arias [phone]       [address] [place] [address]         Equal Access to Services     [facility]: Hadii aad ku hadjonnajayashree Umajose, wazeyadda luqadaha, qaybta karandnato sam, tona acosta. So [facility] [phone].    ATENCIÓN: Si vernellla leida, tiene a reaves disposición servicios gratuitos de asistencia lingüística. Albinaame al [phone].    We comply with applicable federal civil rights laws and [place] laws. We do not discriminate on the basis of race, color, national origin, age, disability, sex, sexual orientation, or gender identity.            Thank you!     Thank you for choosing [facility]  for your care. Our goal is always to provide you with excellent care. Hearing back from our patients is one way we can continue to improve our services. Please take a few minutes to complete the written survey that you may receive in the mail after your visit with us. Thank you!             Your Updated Medication List - Protect others around you: Learn how to safely use, store and throw away your medicines at www.disposemymeds.org.          This list is accurate as of 7/12/18 10:54 AM.  Always use your most recent med list.                   Brand Name Dispense Instructions for use Diagnosis    acetaminophen 325 MG tablet    TYLENOL    100 tablet    Take 3 tablets (975 mg) by mouth every 8 hours as needed for other (mild pain)    S/P cubital tunnel release       ASPIRIN PO      Take 81 mg by mouth        CRESTOR PO      Take 40 mg by mouth        DIPHENHYDRAMINE HCL PO      Take 50 mg by mouth every 6 hours as needed        fluocinolone 0.01 % solution    SYNALAR    60 mL    APPLY TOPICALLY TWICE A DAY    Intrinsic eczema       methocarbamol 750 MG tablet    ROBAXIN    60 tablet    Take 1 tablet (750 mg) by mouth every 6 hours as needed for muscle spasms (muscle spasm)    S/P cubital tunnel release       oxyCODONE IR 5 MG tablet    ROXICODONE    30 tablet    Take 1-2 tablets (5-10 mg) by mouth every 3 hours as needed for pain or other  (Moderate to Severe)    S/P cubital tunnel release       varenicline 1 MG tablet    CHANTIX    60 tablet    Take 1 tablet (1 mg) by mouth 2 times daily    Tobacco abuse

## 2018-07-12 NOTE — PATIENT INSTRUCTIONS
- Further procedures recommended:   - Would repeat Lumbar RFA will need to get records first.  - Medication Management:    - reasonable to continue as needed acetaminophen and motrin   - Physical Therapy: Would consider  Pain PHYSICAL THERAPY    - Urine toxicology screen today: no    - Follow up: For procedure    ----------------------------------------------------------------  Nurse Triage line:  [phone]   Call this number with any questions or concerns. You may leave a detailed message anytime. Calls are typically returned Monday through Friday between 8 AM and 4:30 PM. We usually get back to you within 2 business days depending on the issue/request.       Medication refills:    For non-narcotic medications, call your pharmacy directly to request a refill. The pharmacy will contact the [facility] for authorization. Please allow 3-4 days for these refills to be processed.     For narcotic refills, call the nurse triage line or send a [employer] message. Please contact us 7-10 days before your refill is due. The message MUST include the name of the specific medication(s) requested and how you would like to receive the prescription(s). The options are as follows:    Pain Clinic staff can mail the prescription to your pharmacy. Please tell us the name of the pharmacy.    You may pick the prescription up at the Pain Clinic (tell us the location) or during a clinic visit with your pain provider    Pain Clinic staff can deliver the prescription to the [place] pharmacy in the clinic building. Please tell us the location.      Scheduling number: [phone].  Call this number to schedule or change appointments.    We believe regular attendance is key to your success in our program.    Any time you are unable to keep your appointment we ask that you call us at least 24 hours in advance to let us know. This will allow us to offer the appointment time to another patient.

## 2018-07-12 NOTE — PROGRESS NOTES
[facility] Consultation    Date of visit: 7/12/2018    Reason for consultation:    Primary Care Provider is Cipriano Arias.  Pain medications are being prescribed by n/a.    Please see the [facility] health questionnaire which the patient completed and reviewed with me in detail.    Chief Complaint:    No chief complaint on file.      Pain history:  Lani Barry is a 63 year old female hx of prior ant/post L4-S1 fusion.  who first started having problems with pain 24 yrs. The pt reports having a fall. She subsequently had LBP radiating to her LE. After her fusion the radiating pain improved. She now has mostly axial LBP radiating to her buttocks. The pain is mostly a constant deep aching pain, but occasionally sharp. The pain occasionally radiates to her upper buttocks. The pain is worse sitting for extended periods of time. The pain is worse when standing for extended periods of time.  The pain is with ext and flexion. The pain affects her ability to do her daily activities. The pain is slightly better with standing up straight or lying back. Of note pt used to have intermittent Lumbar RFAs(last 7/16) with significant relief for greater than 1yr.. The pt recently saw Dr. Janette tim for a bilateral facet joint injection( with sig relief for approx a month, but not as good as the RFA)    No numbness/burning   No incontinence No foot drop, progressive weakness.    Of note pt has seen pain specialist in [place], [place] Dr. Schafer  [address] [place] [address] [address]   [place] [address]  Phone: [phone]  Fax: [phone]      Pain rating: intensity  Averages 5/10 on a 0-10 scale.  2123}  Any bowel or bladder incontinence: no    Current treatments include:  none    Previous medication treatments included:  Took oxy and methocarbamol(all cubital tunnl release)     Other treatments have included:  Lani Barry has been seen at a pain clinic in the past.  [facility]  PT:  helped aquatic therapy   Chiropractic: no  Acupuncture: no  TENs Unit: no  Injections: : lumbar facet joint injections at L3-4 bilaterally,  7/17 Facet 2016 short term relief  Lumbar RFA last 2016 with significant relief.       Past Medical History:  Past Medical History:   Diagnosis Date     Arthritis      Cerebral infarction (H)      Morbid obesity (H) 10/25/2017     Past Surgical History:  Past Surgical History:   Procedure Laterality Date     INJECT FACET JOINT N/A 7/10/2017    Procedure: INJECT FACET JOINT;  Lumbar 3-4 Bilateral Facet joint injection;  Surgeon: Misha Rae MD;  Location: [facility] OR     RELEASE ULNAR NERVE (ELBOW) Left 8/30/2016    Procedure: RELEASE ULNAR NERVE (ELBOW);  Surgeon: Steve Parks DO;  Location: [facility] OR     TRANSPOSITION ULNAR NERVE (ELBOW) Left 6/19/2018    Procedure: TRANSPOSITION ULNAR NERVE (ELBOW);  Left ulnar nerve decompression with transposition;  Surgeon: Steve Parks DO;  Location: [facility] OR     Medications:  Current Outpatient Prescriptions   Medication Sig Dispense Refill     ASPIRIN PO Take 81 mg by mouth       Rosuvastatin Calcium (CRESTOR PO) Take 40 mg by mouth        varenicline (CHANTIX) 1 MG tablet Take 1 tablet (1 mg) by mouth 2 times daily 60 tablet 1     acetaminophen (TYLENOL) 325 MG tablet Take 3 tablets (975 mg) by mouth every 8 hours as needed for other (mild pain) (Patient not taking: Reported on 7/12/2018) 100 tablet 0     DIPHENHYDRAMINE HCL PO Take 50 mg by mouth every 6 hours as needed       fluocinolone (SYNALAR) 0.01 % solution APPLY TOPICALLY TWICE A DAY (Patient not taking: Reported on 7/12/2018) 60 mL 3     methocarbamol (ROBAXIN) 750 MG tablet Take 1 tablet (750 mg) by mouth every 6 hours as needed for muscle spasms (muscle spasm) (Patient not taking: Reported on 7/12/2018) 60 tablet 0     oxyCODONE IR (ROXICODONE) 5 MG tablet Take 1-2 tablets (5-10 mg) by mouth every 3 hours as needed for pain or other (Moderate to  Severe) (Patient not taking: Reported on 7/12/2018) 30 tablet 0     Allergies:     Allergies   Allergen Reactions     Bees      Social History:  Home situation:    Occupation/Schooling: retired   Tobacco use: yes  Alcohol use: no  Drug use: no hx of meth/cocaine long time ago   History of chemical dependency treatment: no    Family history:  Family History   Problem Relation Age of Onset     Cancer Sister      Cerebrovascular Disease Sister      Diabetes Paternal Uncle      Family history of headaches: no    Review of Systems:  Skin: negative  Eyes: negative  Ears/Nose/Throat: negative  Respiratory: No shortness of breath, dyspnea on exertion, cough, or hemoptysis  Cardiovascular: negative  Gastrointestinal: negative  Genitourinary: negative  Musculoskeletal: negative  Neurologic: negative  Psychiatric: negative  Hematologic/Lymphatic/Immunologic: negative  Endocrine: negative    Physical Exam:  Vitals:    07/12/18 1006   BP: 115/83   Pulse: 69   Weight: 92.5 kg (204 lb)     Exam:  Constitutional: healthy, alert and no distress  Head: normocephalic. Atraumatic.   Eyes: no redness or jaundice noted   ENT: oropharnx normal.  MMM.  Neck supple.    Cardiovascular: Negative JVD  Respiratory: Speaking in full sentences no accessory muscles use   gastrointestinal: soft, non-tender,  Skin: no suspicious lesions or rashes  Psychiatric: mentation appears normal and affect normal/bright    Musculoskeletal exam:  Gait/Station/Posture: wnl  Cervical spine: ROM       Thoracic spine:  Normal     Lumbar spine:     ROM: decreased ext   Myofascial tenderness:  Diffuse    Jean Baptiste's: ++++ reproduces her pain               SI: ++   Greater trochanteric bursa: +++  Neurologic exam:  CN:  Cranial nerves 2-12 are normal  Motor:  5/5 LE strength  Reflexes:     Achilles:  +2    Sensory:  (upper and lower extremities):   Light touch: normal    Allodynia: absent    Dysethesia: absent    Hyperalgesia: absent     Diagnostic tests:  L1-L2:    Normal disc, facet joints, spinal canal and neural foramina.     L2-L3:  Mild annular disc bulge. Mild degenerative change in the facet  joints.     L3-L4:  Degeneration of the disc. Mild annular disc bulge. Moderate  degenerative change in the facet joints. Central canal normal. Neural  foramina appear patent.     L4-L5:  Anterior fusion. Posterior decompression and fusion. Central  canal and neural foramina appear patent.     L5-S1:  Anterior and posterior fusion. Central canal and neural  foramina are patent.         IMPRESSION:   1. Anterior fusions at L4-L5 and L5-S1. Posterior decompression and  fusion extending from L4-S1.  2. Mild degenerative change at L2-L3 and L3-L4.  3. No focal disc herniations are seen.    Assessment/Plan:  Lani Barry is a 63 year old female who presents with the complaints of Axial lbp.   Diagnoses and all orders for this visit:    Lumbosacral spondylosis without myelopathy  -     PAIN INJECTION EVAL/TREAT/FOLLOW UP     - Further procedures recommended:   - Would repeat Lumbar RFA will need to get records first.  - Medication Management:    - reasonable to continue as needed acetaminophen and motrin    - Consider muscle relaxer in the future   - Physical Therapy: Would order Pain PHYSICAL THERAPY    - Urine toxicology screen today: no    - Follow up: For procedure-          Total time spent was 60 minutes, and more than 50% of face to face time was spent counseling and/or coordination of care regarding principles of multidisciplinary care and medication management axial LBP    Luigi Wong MD  [facility]

## 2018-07-12 NOTE — TELEPHONE ENCOUNTER
Per Medicare medical policy-No PA required        RFA  o Dx of arthropathy, spondylosis, or sprain; failed conservative treatment including 4 weeks of PT or an unfavorable evaluation; 2 successful workups resulting in >80-90% pain relief    o REPEAT RFA  - Must wait 6 months and experience significant pain relief following previous RFA. Prior Auth is NOT required.      Routing to nursing to review, if okay to schedule please send to scheduling coordinators.      Mariluz WATSON    [facility]

## 2018-07-13 ASSESSMENT — PATIENT HEALTH QUESTIONNAIRE - PHQ9: SUM OF ALL RESPONSES TO PHQ QUESTIONS 1-9: 3

## 2018-07-13 NOTE — TELEPHONE ENCOUNTER
Called patient to schedule Repeat Lumbar RFA. Patient stated that Dr. Wong wanted to see the records from her last RFA with Dr. Schafer before deciding on whether to proceed or not. Patient would like to know if Dr. Wong received and reviewed the records before scheduling.        Loulou Valle    [facility]

## 2018-07-13 NOTE — TELEPHONE ENCOUNTER
Last RFA was in July, 2 years ago. Patient states that she had gotten 100% relief for over 6 months.     Alison ANDRADEN-RN Care Coordinator  [facility]

## 2018-07-17 ENCOUNTER — HOSPITAL ENCOUNTER (OUTPATIENT)
Dept: OCCUPATIONAL THERAPY | Facility: CLINIC | Age: 63
Setting detail: THERAPIES SERIES
End: 2018-07-17
Attending: ORTHOPAEDIC SURGERY
Payer: MEDICARE

## 2018-07-17 PROCEDURE — 97140 MANUAL THERAPY 1/> REGIONS: CPT | Mod: GO

## 2018-07-17 PROCEDURE — 97110 THERAPEUTIC EXERCISES: CPT | Mod: GO

## 2018-07-17 PROCEDURE — 97166 OT EVAL MOD COMPLEX 45 MIN: CPT | Mod: GO

## 2018-07-17 PROCEDURE — G8987 SELF CARE CURRENT STATUS: HCPCS | Mod: GO,CJ

## 2018-07-17 PROCEDURE — G8988 SELF CARE GOAL STATUS: HCPCS | Mod: GO,CI

## 2018-07-17 PROCEDURE — 40000839 ZZH STATISTIC HAND THERAPY VISIT

## 2018-07-17 NOTE — TELEPHONE ENCOUNTER
Pre-screening Questions for RFA Procedure      Procedure ordered? LRFA    What insurance are we billing for this procedure?  MEDICARE    Has patient had this injection before? Yes: 2016  Any chance of pregnancy? NO   If YES, do NOT schedule and route to RN pool    Is  Needed?: No  Will patient have a ?  Yes   If pt is given sedation meds, no driving for 24 hours.  Is pt taking a cab or transportation service? NO        If so will need to be accompanied by an adult too (friend/family member) in order for IV sedation to be given.      Per [facility] Policy:  Outpatients are to have responsible adult or family member to accompany them at discharge and drive them home. A service providing medically trained drivers or attendants would be acceptable. Public transportation would not be acceptable unless the patient is accompanied by a responsible adult or family member.    Is patient taking any blood thinners (plavix, coumadin, jantoven, warfarin, heparin, pradaxa or dabigatran )? No   If YES, do NOT schedule, and route to RN pool    Is patient taking any aspirin products? Yes - Pt takes 81mg daily; instructed to hold 0 day(s) prior to procedure.      If more than 325mg/day do NOT schedule and route to RN pool     For CERVICAL procedures, hold all aspirin products for 6 days.     Does the patient have a bleeding or clotting disorder? No     If YES, it it OKAY to schedule AND route to RN pool    **For any patients with platelet count <100, must be forwarded to provider**    Does patient have an active infection or treated for one within the past week? No   If YES, do NOT schedule and route to RN nurse pool     Is patient currently taking any antibiotics?  No    For patients on chronic, preventative, or prophylactic antibiotics, procedures may be scheduled.     For patients on antibiotics for active or recent infection:    Christelle Muller Burton, Snitzer-antibiotic course must have been completed for  4 days    Is patient currently taking any steroid medications? (i.e. Prednisone, Medrol)  No     For patients on steroid medications:    Jono Brady, Christelle, Judith Gilman-steroid course must have been completed for 4 days    Reviewed with patient:  If you are started on any steroids or antibiotics between now and your appointment, you must contact us because it may affect our ability to perform your procedure.  Yes    Is patient actively being treated for cancer or immunocompromised, including the spleen having been removed? No  If YES, do NOT schedule and route to RN pool     Any history of complications with sedation medications?  NO   If YES, OK to schedule AND route to RN pool     Any history of sleep apnea?  NO   If YES, OK to schedule AND route to RN pool     Any cardiac history?  NO   If YES, OK to schedule AND route to RN pool     Do you have an implanted pacemaker, ICD (implanted cardiac device) or AICD (automatic implanted cardiac device)?  NO    If YES, do NOT schedule AND route to RN pool.     Obtain name of device :      Obtain name of cardiologist:       Do you have an implanted stimulator?  NO    If YES, OK to schedule AND route to nursing.     Instruct patient to bring in the remote to the appointment and it will need to be turned off.        Does patient have an allergy to contrast dye, iodine or shellfish?  No   If YES, OK to schedule. Route to RN pool AND add allergy information to appointment notes    Are you able to get on and off an exam table with minimal or no assistance? Yes   If NO, do NOT schedule and route to RN pool    Are you able to roll over and lay on your stomach with minimal or no assistance? Yes   If NO, do NOT schedule and route to RN pool    Informed patient that s/he needs to be NPO for 6 hours before procedure?  YES   Informed patient that it is OK to take normal medications with sips of water, especially blood pressure medications, before the procedure and  must hold blood thinners as instructed.  Yes  Informed patient to arrive 30 minutes before procedure time to have an IV inserted.  reviewed   Do NOT schedule at 0745, 0815 or 1245.  reviewed   All radiofrequency ablations are in a 90 minute time slot except genicular radiofrequency ablation those are 60 minute time slot.  reviewed     In [place] there are only 6 probes for each area (lumbar and cervical).        Make sure that there is at least 48 hours between Lumbar RFA s      Make sure there is at least 48 hours between procedures needing cervical probes (Cervical RFAs, Genicular RFAs, and TON RFA s).

## 2018-07-20 ENCOUNTER — HOSPITAL ENCOUNTER (OUTPATIENT)
Dept: OCCUPATIONAL THERAPY | Facility: CLINIC | Age: 63
Setting detail: THERAPIES SERIES
End: 2018-07-20
Attending: ORTHOPAEDIC SURGERY
Payer: MEDICARE

## 2018-07-20 PROCEDURE — 40000839 ZZH STATISTIC HAND THERAPY VISIT: Performed by: OCCUPATIONAL THERAPIST

## 2018-07-20 PROCEDURE — 97110 THERAPEUTIC EXERCISES: CPT | Mod: GO | Performed by: OCCUPATIONAL THERAPIST

## 2018-07-20 PROCEDURE — 97140 MANUAL THERAPY 1/> REGIONS: CPT | Mod: GO | Performed by: OCCUPATIONAL THERAPIST

## 2018-07-20 PROCEDURE — 97035 APP MDLTY 1+ULTRASOUND EA 15: CPT | Mod: GO | Performed by: OCCUPATIONAL THERAPIST

## 2018-07-20 NOTE — PROGRESS NOTES
07/17/18 1040   Quick Adds   Quick Adds Certification   Therapy Certification   Certification date from 07/17/18   Certification date to 10/15/18   Medical Diagnosis Cubital tunnel syndrome; Left (G56.22)   Certification I certify the need for these services furnished under this plan of treatment and while under my care.  (Physician co-signature of this document indicates review and certification of the therapy plan).   General Information/History   Start Of Care Date 07/17/18   Referring Physician Dr. Steve Parks   Orders Evaluate And Treat As Indicated   Orders Date 07/11/18   Medical Diagnosis Cubital tunnel syndrome; Left (G56.22)   Precautions/Limitations No pushing, no pulling, no lifting with Left Upper extremity   Additional Occupational Profile Info/Pertinent history of current problem Patient reports that Left side is weaker after per her report 2 'mini strokes'.     Previous treatment or current condition Cubital tunnel syndrome and sugery 4 weeks ago.  2 years ago had decompression of ulnar nerve (2016); Carpal tunnel release surgery (2010);    Past medical history Patient had Left TKA in 2009.  Patient reported 'mini strokes' occurred 2014 and 2015.    How/Where did it occur With repetition/overuse   Onset date of current episode/exacerbation 05/18/18   Date of surgery 06/19/18   Surgical procedure cubital tunnel release   Chronicity Chronic   Hand Dominance Right   Affected side Left   Functional limitations perform activities of daily living;perform desired leisure / sports activities   Reported Symptoms Pain;Loss of Motion/Stiffness;Loss of strength;Tingling;Numbness   Prior level of function Independent ADL;Independent IADL   Important Activities Crocheting, sewing, painting   Level of functions comments Patient is not able to sew at this time and crocheting is difficult   Patient role/Employment history Retired   Occupation Patient history of , , 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     Patient/Family goals statement Patient would like to get full ROM of LUE.    Fall Risk Screen   Fall screen completed by OT   Have you fallen 2 or more times in the past year? Yes   Have you fallen and had an injury in the past year? No   Fall screen comments Patient reported a fall when she was rollerskating.     Scar/Wound   Scar/Wound Scar   Scar   Scar location Left medial side of elbow   Appearance Red   Sensitivity None   Quality Adhered;Smooth   ROM   ROM AROM   AROM   AROM Elbow;Wrist   Wrist   Wrist Extension - Left 110   Wrist Flexion- Left 46   Wrist Supination- Left 38   Wrist Pronation- Left 67   Elbow   Elbow Extension- Left 165   Elbow Flexion- Left 29   Strength   Strength Strength    Avg - Left 64    Avg - Right 29   Lateral Pinch - Left 10.5   Lateral Pinch - Right 18.5   3 Point Pinch - Left 5   3 Point Pinch - Right 15   Therapy Interventions   Planned Therapy Interventions Ultrasound;Light Therapy;Paraffin;ROM;Manual Therapy;Splinting;Education of splint wear, care, fit and precautions;Scar Management;Desensitization;Self Care/Home Management;Joint Protection Instruction;Home Program   Clinical Impression   Criteria for Skilled Therapeutic Interventions Met yes;treatment indicated   OT Diagnosis Decreased BADL/IADL performance due to limited use of LUE    Influenced by the following impairments Pain;Decreased strength;Decreased range of motion   Assessment of Occupational Performance 3-5 Performance Deficits   Identified Performance Deficits Cooking, cleaning, leisure task, BADL   Clinical Decision Making (Complexity) Moderate complexity   Therapy Frequency 2x/week   Predicted Duration of Therapy Intervention (days/wks) 12 weeks   Risks and Benefits of Treatment have been explained. Yes   Patient, Family & other staff in agreement with plan of care Yes   Hand Goals   Hand Goals Household Chores;Enter Other Activity Here;Dressing   Dressing   Current Functional  Task Dressing LB;Dressing UB   Previous Performance Level Independent   Current Performance Level Moderate difficulty   Goal Target Task Reach entire lower body;Don/Doff pants;Don/Doff bra   Goal Target Performance Level Mild difficulty   Due Date 10/15/18   Household Chores   Current Functional Task Reaching;Washing and drying dishes;Sweeping;Cleaning bathtub, refrigerator, stove;Washing floors;Cooking;Gripping;Carrying;Pushing   Previous Performance Level Independent   Current Performance Level Severe difficulty   Goal Target Task Reach shelves to put dishes away;Hold and use vacuum ;Sweep floors;Open a tight or new jar; and turn to open a door;Carry a shopping bag   Goal Target Performance Level Severe difficulty   Due Date 10/15/18   Enter Other Activity Here   Current Functional Task Leisure activities; sewing, crocheting, painting   Previous Performance Level Independent   Current Performance Level moderate difficulty   STG Target Task pulling/pushing fabric through sewing machine, crocheting without pain   STG Target Performance Level Mild difficulty   Due Date 10/15/18   Total Evaluation Time   Total Evaluation Time (Minutes) 30       CHANO Dempsey  [facility]  [phone]

## 2018-07-20 NOTE — PROGRESS NOTES
[facility]      OUTPATIENT OCCUPATIONAL THERAPY HAND EVALUATION  PLAN OF TREATMENT FOR OUTPATIENT REHABILITATION  (COMPLETE FOR INITIAL CLAIMS ONLY)  Patient's Last Name, First Name, M.I.  YOB: 1955  Lani Barry                        Provider s Name: [facility] Medical Record No.  [id number]     Onset Date: 05/18/18    Start of Care Date: 07/17/18   Type:     ___PT  _X_OT   ___SLP    Medical Diagnosis: Cubital tunnel syndrome; Left (G56.22)   Occupational Therapy Diagnosis:  Decreased BADL/IADL performance due to limited use of LUE     Visits from SOC: 1      _________________________________________________________________________________  Plan of Treatment/Functional Goals:  Planned Therapy Interventions:  Ultrasound, Light Therapy, Paraffin, ROM, Manual Therapy, Splinting, Education of splint wear, care, fit and precautions, Scar Management, Desensitization, Self Care/Home Management, Joint Protection Instruction, Home Program     Goals            1. Goal Target Task: Reach entire lower body, Don/Doff pants, Don/Doff bra       Goal Target Performance Level: Mild difficulty       Due Date: 10/15/18           2. Goal Target Task: Reach shelves to put dishes away, Hold and use vacuum , Sweep floors, Open a tight or new jar,  and turn to open a door, Carry a shopping bag       Goal Target Performance Level: Severe difficulty       Due Date: 10/15/18     3.Enter Other Activity Here-STG Target Task: pulling/pushing fabric through sewing machine, crocheting without pain       Enter Other Activity Here-STG Target Performance Level: Mild difficulty       Enter Other Activity Here-Due Date: 10/15/18            Treatment Frequency: Therapy Frequency: 2x/week  Predicated Duration of Therapy Intervention:  Predicted Duration of Therapy Intervention (days/wks): 12  nash Pham, OT         I CERTIFY THE NEED FOR THESE SERVICES FURNISHED UNDER        THIS PLAN OF TREATMENT AND WHILE UNDER MY CARE     (Physician co-signature of this document indicates review and certification of the therapy plan).                Certification Period:  07/17/18 to 10/15/18            Referring Physician:  Dr. Steve Parks    Initial Assessment        See Epic Evaluation Start of Care Date: 07/17/18

## 2018-07-24 ENCOUNTER — HOSPITAL ENCOUNTER (OUTPATIENT)
Dept: OCCUPATIONAL THERAPY | Facility: CLINIC | Age: 63
Setting detail: THERAPIES SERIES
End: 2018-07-24
Attending: ORTHOPAEDIC SURGERY
Payer: MEDICARE

## 2018-07-24 PROCEDURE — 97110 THERAPEUTIC EXERCISES: CPT | Mod: GO

## 2018-07-24 PROCEDURE — 97140 MANUAL THERAPY 1/> REGIONS: CPT | Mod: GO

## 2018-07-24 PROCEDURE — 97035 APP MDLTY 1+ULTRASOUND EA 15: CPT | Mod: GO

## 2018-07-24 PROCEDURE — 40000839 ZZH STATISTIC HAND THERAPY VISIT

## 2018-07-26 ENCOUNTER — HOSPITAL ENCOUNTER (OUTPATIENT)
Dept: OCCUPATIONAL THERAPY | Facility: CLINIC | Age: 63
Setting detail: THERAPIES SERIES
End: 2018-07-26
Attending: ORTHOPAEDIC SURGERY
Payer: MEDICARE

## 2018-07-26 PROCEDURE — 97140 MANUAL THERAPY 1/> REGIONS: CPT | Mod: GO

## 2018-07-26 PROCEDURE — 40000839 ZZH STATISTIC HAND THERAPY VISIT

## 2018-07-26 PROCEDURE — 97110 THERAPEUTIC EXERCISES: CPT | Mod: GO

## 2018-07-31 ENCOUNTER — HOSPITAL ENCOUNTER (OUTPATIENT)
Dept: OCCUPATIONAL THERAPY | Facility: CLINIC | Age: 63
Setting detail: THERAPIES SERIES
End: 2018-07-31
Attending: ORTHOPAEDIC SURGERY
Payer: MEDICARE

## 2018-07-31 PROCEDURE — 97035 APP MDLTY 1+ULTRASOUND EA 15: CPT | Mod: GO

## 2018-07-31 PROCEDURE — 40000839 ZZH STATISTIC HAND THERAPY VISIT

## 2018-07-31 PROCEDURE — 97140 MANUAL THERAPY 1/> REGIONS: CPT | Mod: GO

## 2018-07-31 PROCEDURE — 97110 THERAPEUTIC EXERCISES: CPT | Mod: GO

## 2018-08-02 ENCOUNTER — HOSPITAL ENCOUNTER (OUTPATIENT)
Dept: OCCUPATIONAL THERAPY | Facility: CLINIC | Age: 63
Setting detail: THERAPIES SERIES
End: 2018-08-02
Attending: ORTHOPAEDIC SURGERY
Payer: MEDICARE

## 2018-08-02 PROCEDURE — 40000839 ZZH STATISTIC HAND THERAPY VISIT: Performed by: OCCUPATIONAL THERAPIST

## 2018-08-02 PROCEDURE — 97110 THERAPEUTIC EXERCISES: CPT | Mod: GO | Performed by: OCCUPATIONAL THERAPIST

## 2018-08-02 PROCEDURE — 97035 APP MDLTY 1+ULTRASOUND EA 15: CPT | Mod: GO | Performed by: OCCUPATIONAL THERAPIST

## 2018-08-02 PROCEDURE — 97140 MANUAL THERAPY 1/> REGIONS: CPT | Mod: GO | Performed by: OCCUPATIONAL THERAPIST

## 2018-08-07 ENCOUNTER — HOSPITAL ENCOUNTER (OUTPATIENT)
Dept: OCCUPATIONAL THERAPY | Facility: CLINIC | Age: 63
Setting detail: THERAPIES SERIES
End: 2018-08-07
Attending: ORTHOPAEDIC SURGERY
Payer: MEDICARE

## 2018-08-07 PROCEDURE — 97140 MANUAL THERAPY 1/> REGIONS: CPT | Mod: GO

## 2018-08-07 PROCEDURE — 97110 THERAPEUTIC EXERCISES: CPT | Mod: GO

## 2018-08-07 PROCEDURE — 97035 APP MDLTY 1+ULTRASOUND EA 15: CPT | Mod: GO

## 2018-08-07 PROCEDURE — 40000839 ZZH STATISTIC HAND THERAPY VISIT

## 2018-08-08 ENCOUNTER — OFFICE VISIT (OUTPATIENT)
Dept: ORTHOPEDICS | Facility: CLINIC | Age: 63
End: 2018-08-08
Payer: MEDICARE

## 2018-08-08 ENCOUNTER — HOSPITAL ENCOUNTER (OUTPATIENT)
Dept: OCCUPATIONAL THERAPY | Facility: CLINIC | Age: 63
Setting detail: THERAPIES SERIES
End: 2018-08-08
Attending: ORTHOPAEDIC SURGERY
Payer: MEDICARE

## 2018-08-08 VITALS
DIASTOLIC BLOOD PRESSURE: 71 MMHG | TEMPERATURE: 98.3 F | HEIGHT: 61 IN | WEIGHT: 203.5 LBS | BODY MASS INDEX: 38.42 KG/M2 | SYSTOLIC BLOOD PRESSURE: 105 MMHG

## 2018-08-08 DIAGNOSIS — Z98.890 S/P CUBITAL TUNNEL RELEASE: Primary | ICD-10-CM

## 2018-08-08 PROCEDURE — 97110 THERAPEUTIC EXERCISES: CPT | Mod: GO

## 2018-08-08 PROCEDURE — 40000839 ZZH STATISTIC HAND THERAPY VISIT

## 2018-08-08 PROCEDURE — 97035 APP MDLTY 1+ULTRASOUND EA 15: CPT | Mod: GO

## 2018-08-08 PROCEDURE — 99024 POSTOP FOLLOW-UP VISIT: CPT | Performed by: ORTHOPAEDIC SURGERY

## 2018-08-08 PROCEDURE — 97140 MANUAL THERAPY 1/> REGIONS: CPT | Mod: GO

## 2018-08-08 ASSESSMENT — PAIN SCALES - GENERAL: PAINLEVEL: NO PAIN (0)

## 2018-08-08 NOTE — PROGRESS NOTES
Orthopedic Clinic Post-Operative Note    CHIEF COMPLAINT:   Chief Complaint   Patient presents with     RECHECK     left elbow/ hand follow up     Surgical Followup     DOS:6/19/2018~ Left ulnar nerve decompression at the elbow with subcutaneous transposition~7 weeks postop       HISTORY OF PRESENT ILLNESS  She is very happy with her progress.  Some minimal intermittent tingling into her small and ring fingertips.  No elbow pain.  Full motion.  She discontinued her sling a few weeks ago.  Patient's past medical, surgical, social and family histories reviewed.     Past Medical History:   Diagnosis Date     Arthritis      Cerebral infarction (H)      Morbid obesity (H) 10/25/2017       Past Surgical History:   Procedure Laterality Date     INJECT FACET JOINT N/A 7/10/2017    Procedure: INJECT FACET JOINT;  Lumbar 3-4 Bilateral Facet joint injection;  Surgeon: Misha Rae MD;  Location: [facility]     RELEASE ULNAR NERVE (ELBOW) Left 8/30/2016    Procedure: RELEASE ULNAR NERVE (ELBOW);  Surgeon: Steve Parks DO;  Location: [facility]     TRANSPOSITION ULNAR NERVE (ELBOW) Left 6/19/2018    Procedure: TRANSPOSITION ULNAR NERVE (ELBOW);  Left ulnar nerve decompression with transposition;  Surgeon: Steve Parks DO;  Location[facility]       Medications:    Current Outpatient Prescriptions on File Prior to Visit:  ASPIRIN PO Take 81 mg by mouth   DIPHENHYDRAMINE HCL PO Take 50 mg by mouth every 6 hours as needed   fluocinolone (SYNALAR) 0.01 % solution APPLY TOPICALLY TWICE A DAY   Rosuvastatin Calcium (CRESTOR PO) Take 40 mg by mouth    varenicline (CHANTIX) 1 MG tablet Take 1 tablet (1 mg) by mouth 2 times daily   acetaminophen (TYLENOL) 325 MG tablet Take 3 tablets (975 mg) by mouth every 8 hours as needed for other (mild pain)     No current facility-administered medications on file prior to visit.     Allergies   Allergen Reactions     Bees        Social History     Occupational History     Not  "on file.     Social History Main Topics     Smoking status: Current Some Day Smoker     Packs/day: 0.10     Types: Cigars     Smokeless tobacco: Never Used     Alcohol use No     Drug use: No     Sexual activity: Yes     Partners: Male       Family History   Problem Relation Age of Onset     Cancer Sister      Cerebrovascular Disease Sister      Diabetes Paternal Uncle        REVIEW OF SYSTEMS  General: negative for, night sweats, dizziness, fatigue  Resp: No shortness of breath and no cough  CV: negative for chest pain, syncope or near-syncope  GI: negative for nausea, vomiting and diarrhea  : negative for dysuria and hematuria  Musculoskeletal: as above  Neurologic: negative for syncope   Hematologic: negative for bleeding disorder    Physical Exam:  Vitals: /71 (BP Location: Left arm, Patient Position: Chair, Cuff Size: Adult Large)  Temp 98.3  F (36.8  C) (Oral)  Ht 5' 0.75\" (1.543 m)  Wt 203 lb 8 oz (92.3 kg)  BMI 38.77 kg/m2  BMI= Body mass index is 38.77 kg/(m^2).  Constitutional: healthy, alert and no acute distress   Psychiatric: mentation appears normal and affect normal/bright  NEURO: no focal deficits  SKIN: .well healed, no erythema, no incision breakdown and no drainage.  JOINT/EXTREMITIES: Full elbow motion.  Fingers are warm and dry with good cap refill.  No gross weakness.  GAIT: not tested     Diagnostic Modalities:  None today.  Independent visualization of the images was performed.      Impression:   Chief Complaint   Patient presents with     RECHECK     left elbow/ hand follow up     Surgical Followup     DOS:6/19/2018~ Left ulnar nerve decompression at the elbow with subcutaneous transposition~7 weeks postop   Doing well    Plan:   I again reviewed her restrictions.  Recommend no lifting pushing or pulling yet.  Okay to start some resistance exercises August 20.  Return to clinic 6, week(s), or sooner as needed for changes.    Re-x-ray on return: No    Madan Parks D.O.  "

## 2018-08-08 NOTE — LETTER
8/8/2018         RE: Lani Barry  [address] [facility] [address]        Dear Colleague,    Thank you for referring your patient, Lani Barry, to the [facility]. Please see a copy of my visit note below.    Orthopedic Clinic Post-Operative Note    CHIEF COMPLAINT:   Chief Complaint   Patient presents with     RECHECK     left elbow/ hand follow up     Surgical Followup     DOS:6/19/2018~ Left ulnar nerve decompression at the elbow with subcutaneous transposition~7 weeks postop       HISTORY OF PRESENT ILLNESS  She is very happy with her progress.  Some minimal intermittent tingling into her small and ring fingertips.  No elbow pain.  Full motion.  She discontinued her sling a few weeks ago.  Patient's past medical, surgical, social and family histories reviewed.     Past Medical History:   Diagnosis Date     Arthritis      Cerebral infarction (H)      Morbid obesity (H) 10/25/2017       Past Surgical History:   Procedure Laterality Date     INJECT FACET JOINT N/A 7/10/2017    Procedure: INJECT FACET JOINT;  Lumbar 3-4 Bilateral Facet joint injection;  Surgeon: Misha Rae MD;  [facility]     RELEASE ULNAR NERVE (ELBOW) Left 8/30/2016    Procedure: RELEASE ULNAR NERVE (ELBOW);  Surgeon: Steve Parks DO;  [facility]     TRANSPOSITION ULNAR NERVE (ELBOW) Left 6/19/2018    Procedure: TRANSPOSITION ULNAR NERVE (ELBOW);  Left ulnar nerve decompression with transposition;  Surgeon: Steve Parks DO;  [facility]       Medications:    Current Outpatient Prescriptions on File Prior to Visit:  ASPIRIN PO Take 81 mg by mouth   DIPHENHYDRAMINE HCL PO Take 50 mg by mouth every 6 hours as needed   fluocinolone (SYNALAR) 0.01 % solution APPLY TOPICALLY TWICE A DAY   Rosuvastatin Calcium (CRESTOR PO) Take 40 mg by mouth    varenicline (CHANTIX) 1 MG tablet Take 1 tablet (1 mg) by mouth 2 times daily   acetaminophen (TYLENOL) 325 MG tablet Take 3 tablets  "(975 mg) by mouth every 8 hours as needed for other (mild pain)     No current facility-administered medications on file prior to visit.     Allergies   Allergen Reactions     Bees        Social History     Occupational History     Not on file.     Social History Main Topics     Smoking status: Current Some Day Smoker     Packs/day: 0.10     Types: Cigars     Smokeless tobacco: Never Used     Alcohol use No     Drug use: No     Sexual activity: Yes     Partners: Male       Family History   Problem Relation Age of Onset     Cancer Sister      Cerebrovascular Disease Sister      Diabetes Paternal Uncle        REVIEW OF SYSTEMS  General: negative for, night sweats, dizziness, fatigue  Resp: No shortness of breath and no cough  CV: negative for chest pain, syncope or near-syncope  GI: negative for nausea, vomiting and diarrhea  : negative for dysuria and hematuria  Musculoskeletal: as above  Neurologic: negative for syncope   Hematologic: negative for bleeding disorder    Physical Exam:  Vitals: /71 (BP Location: Left arm, Patient Position: Chair, Cuff Size: Adult Large)  Temp 98.3  F (36.8  C) (Oral)  Ht 5' 0.75\" (1.543 m)  Wt 203 lb 8 oz (92.3 kg)  BMI 38.77 kg/m2  BMI= Body mass index is 38.77 kg/(m^2).  Constitutional: healthy, alert and no acute distress   Psychiatric: mentation appears normal and affect normal/bright  NEURO: no focal deficits  SKIN: .well healed, no erythema, no incision breakdown and no drainage.  JOINT/EXTREMITIES: Full elbow motion.  Fingers are warm and dry with good cap refill.  No gross weakness.  GAIT: not tested     Diagnostic Modalities:  None today.  Independent visualization of the images was performed.      Impression:   Chief Complaint   Patient presents with     RECHECK     left elbow/ hand follow up     Surgical Followup     DOS:6/19/2018~ Left ulnar nerve decompression at the elbow with subcutaneous transposition~7 weeks postop   Doing well    Plan:   I again reviewed " her restrictions.  Recommend no lifting pushing or pulling yet.  Okay to start some resistance exercises August 20.  Return to clinic 6, week(s), or sooner as needed for changes.    Re-x-ray on return: No    Madan Parks D.O.    Again, thank you for allowing me to participate in the care of your patient.        Sincerely,        Steve Parks, DO

## 2018-08-08 NOTE — MR AVS SNAPSHOT
After Visit Summary   8/8/2018    Lani Barry    MRN: [id number]           Patient Information     Date Of Birth          1955        Visit Information        Provider Department      8/8/2018 10:10 AM Steve Parks DO [facility]        Today's Diagnoses     S/P cubital tunnel release    -  1       Follow-ups after your visit        Your next 10 appointments already scheduled     Aug 14, 2018 11:30 AM CDT   Hand Treatment with Tasha Pham OT   [facility] ([facility])    [address] [place] [address] [address] [place] [address]   [phone]            Aug 16, 2018  1:45 PM CDT   Radiology Injections with Luigi Wong MD   [facility] ([facility])    [address]  [place] [address]   [phone]            Aug 17, 2018 11:30 AM CDT   Hand Treatment with Beata Funes, OT   [facility] ([facility])    [address] [place] [address] [address] [place] [address]   [phone]            Aug 20, 2018 11:30 AM CDT   Hand Treatment with Beata Funes OT   [facility] ([facility])    [address] [facility] [address] [place] [address]   [phone]            Aug 22, 2018 11:30 AM CDT   Hand Treatment with Tasha Pham, OT   [facility] ([facility])    [address] [place] [address] [address] [place] [address]   [phone]            Aug 28, 2018 10:15 AM CDT   Hand Treatment with Sania Eller OT   [facility] ([facility])    [facility] [address] [place] [address]   [phone]            Aug 30, 2018 10:15 AM CDT   Hand Treatment with Sania Eller OT   [facility] ([facility])    [facility] [address] [place] [address]   [phone]            Sep 04, 2018 11:15 AM CDT   Hand  "Treatment with Sania Eller OT   [facility] ([facility])    [address] [place] [address] [phone]            Sep 06, 2018 11:15 AM CDT   Hand Treatment with Sania Eller OT   [facility] ([facility])    [address] [place] [address] [phone]              Who to contact     If you have questions or need follow up information about today's clinic visit or your schedule please contact [facility] directly at [phone].  Normal or non-critical lab and imaging results will be communicated to you by MyChart, letter or phone within 4 business days after the clinic has received the results. If you do not hear from us within 7 days, please contact the clinic through MyChart or phone. If you have a critical or abnormal lab result, we will notify you by phone as soon as possible.  Submit refill requests through [employer] or call your pharmacy and they will forward the refill request to us. Please allow 3 business days for your refill to be completed.          Additional Information About Your Visit        Care EveryWhere ID     This is your Care EveryWhere ID. This could be used by other organizations to access your [facility] medical records  [id number]        Your Vitals Were     Temperature Height BMI (Body Mass Index)             98.3  F (36.8  C) (Oral) 1.543 m (5' 0.75\") 38.77 kg/m2          Blood Pressure from Last 3 Encounters:   08/08/18 105/71   07/12/18 115/83   07/11/18 108/72    Weight from Last 3 Encounters:   08/08/18 92.3 kg (203 lb 8 oz)   07/12/18 92.5 kg (204 lb)   07/11/18 92.1 kg (203 lb)              Today, you had the following     No orders found for display       Primary Care Provider Office Phone # Fax #    Cipriano Arias [phone]       [address] [place] [address]        Equal Access to Services     " ELKIN PELAEZ : Hadii aad ku debbie Funk, waaxda luqadaha, qaybta kaalmada ademary, tona robin bobbiantonio quarlesorvillemary de la torre . So [facility] [phone].    ATENCIÓN: Si habla español, tiene a reaves disposición servicios gratuitos de asistencia lingüística. Llame al [phone].    We comply with applicable federal civil rights laws and [place] laws. We do not discriminate on the basis of race, color, national origin, age, disability, sex, sexual orientation, or gender identity.            Thank you!     Thank you for choosing [facility]  for your care. Our goal is always to provide you with excellent care. Hearing back from our patients is one way we can continue to improve our services. Please take a few minutes to complete the written survey that you may receive in the mail after your visit with us. Thank you!             Your Updated Medication List - Protect others around you: Learn how to safely use, store and throw away your medicines at www.disposemymeds.org.          This list is accurate as of 8/8/18 10:36 AM.  Always use your most recent med list.                   Brand Name Dispense Instructions for use Diagnosis    acetaminophen 325 MG tablet    TYLENOL    100 tablet    Take 3 tablets (975 mg) by mouth every 8 hours as needed for other (mild pain)    S/P cubital tunnel release       ASPIRIN PO      Take 81 mg by mouth        CRESTOR PO      Take 40 mg by mouth        DIPHENHYDRAMINE HCL PO      Take 50 mg by mouth every 6 hours as needed        fluocinolone 0.01 % solution    SYNALAR    60 mL    APPLY TOPICALLY TWICE A DAY    Intrinsic eczema       varenicline 1 MG tablet    CHANTIX    60 tablet    Take 1 tablet (1 mg) by mouth 2 times daily    Tobacco abuse

## 2018-08-16 ENCOUNTER — RADIOLOGY INJECTION OFFICE VISIT (OUTPATIENT)
Dept: PALLIATIVE MEDICINE | Facility: CLINIC | Age: 63
End: 2018-08-16
Payer: MEDICARE

## 2018-08-16 ENCOUNTER — RADIANT APPOINTMENT (OUTPATIENT)
Dept: RADIOLOGY | Facility: CLINIC | Age: 63
End: 2018-08-16
Attending: PAIN MEDICINE
Payer: MEDICARE

## 2018-08-16 VITALS — OXYGEN SATURATION: 100 % | SYSTOLIC BLOOD PRESSURE: 102 MMHG | DIASTOLIC BLOOD PRESSURE: 55 MMHG | HEART RATE: 82 BPM

## 2018-08-16 DIAGNOSIS — M47.816 SPONDYLOSIS OF LUMBAR REGION WITHOUT MYELOPATHY OR RADICULOPATHY: ICD-10-CM

## 2018-08-16 DIAGNOSIS — M47.817 LUMBOSACRAL SPONDYLOSIS WITHOUT MYELOPATHY: Primary | ICD-10-CM

## 2018-08-16 PROCEDURE — 64634 DESTROY C/TH FACET JNT ADDL: CPT | Mod: 50 | Performed by: PAIN MEDICINE

## 2018-08-16 PROCEDURE — 99153 MOD SED SAME PHYS/QHP EA: CPT | Performed by: PAIN MEDICINE

## 2018-08-16 PROCEDURE — 99152 MOD SED SAME PHYS/QHP 5/>YRS: CPT | Performed by: PAIN MEDICINE

## 2018-08-16 PROCEDURE — 64633 DESTROY CERV/THOR FACET JNT: CPT | Mod: 50 | Performed by: PAIN MEDICINE

## 2018-08-16 RX ORDER — HYDROCODONE BITARTRATE AND ACETAMINOPHEN 5; 325 MG/1; MG/1
1 TABLET ORAL EVERY 12 HOURS PRN
Qty: 28 TABLET | Refills: 0 | Status: SHIPPED | OUTPATIENT
Start: 2018-08-16 | End: 2018-09-20

## 2018-08-16 ASSESSMENT — PAIN SCALES - GENERAL: PAINLEVEL: SEVERE PAIN (6)

## 2018-08-16 NOTE — MR AVS SNAPSHOT
After Visit Summary   8/16/2018    Lani Barry    MRN: [id number]           Patient Information     Date Of Birth          1955        Visit Information        Provider Department      8/16/2018 1:45 PM Luigi Wong MD [facility] [place]        Care Instructions    [facility]   Radiofrequency Ablation (RFA) Discharge Instructions     Today you saw:    Dr. Misha Sheets    You should anticipate procedural pain for up to 2 weeks.   You may receive a prescription for pain medication and you should take this as directed.   It may take up to 8 weeks to receive relief from the RFA   Follow up with your provider in 6 weeks.   If you received sedation before, during or after your procedure, for the next 24 hours you shall NOT:    -Drive    -Operate machinery    -Drink alcohol    -Sign any legal documents   You may resume your normal diet   You may resume your regular medications after the procedure   Be cautious with walking. Numbness and/or weakness in the lower extremities may occur for up to 6-8 hours due to effect of local anesthetic  Avoid strenuous activity for the first 24 hours   You may resume your regular activities after 24 hours   You may shower, however no swimming, tub baths or hot tubs for 24 hours following your procedure   You may use ice packs 10-15 minutes three to four times a day at the injection site for comfort   Do not use heat to painful areas for 6 to 8 hours. This will give the local anesthetic time to wear off and prevent you from accidentally burning your skin.   You may use anti-inflammatory medications (such as Ibuprofen or Aleve or Advil) or Tylenol for pain control if necessary   If you experience any of the following, call the Pain Clinic during work hours at [phone] or the Provider Line after hours at [phone]:   -Fever over 100  degree F    -Swelling, bleeding, redness, drainage, warmth at the injection site    -Progressive weakness or numbness on your legs     -Loss of bowel or bladder function    -Unusual new onset of pain that is not improving                Follow-ups after your visit        Your next 10 appointments already scheduled     Aug 16, 2018  1:45 PM CDT   Radiology Injections with Luigi Wong MD   [facility] [place] ([facility] [place])    [address]  [place] [address]   [phone]            Aug 16, 2018  1:45 PM CDT   XR LUMBAR RADIOFREQ ABLATION BILATERAL with BEPAIN1   [facility] [place] [facility] ([facility] [place])    [address]  [place] [address] [phone]           For nerve root injection, please send or bring copies of any MRIs or other scans you have had.  Bring a list of your current medicines to your exam. (Include vitamins, minerals and over-the-counter medicines.) Leave your valuables at home.  Plan to have someone drive you home afterward.  Stop taking the following medicines (but talk to your doctor first):   If you take blood thinners, you may need to stop taking them a few days before treatment. Talk to your doctor before stopping these medicines.Stop taking Coumadin (warfarin) 3 days before treatment. Restart the day after treatment.   If you take Plavix, Ticlid, Pletal or Persantine, please ask your doctor if you should stop these medicines. You may need extra tests on the morning of your scan.   If you take Xarelto (Rivaroxaban), you may need to stop taking it 24 hours before treatment. Talk to your doctor before stopping this medicine. Restart the day after treatment.  You may take your other medicines as normal.  Stop all food and drink (including water) 3 hours before your test or treatment.  Please tell the doctor:   If you are allergic to X-ray dye (contrast fluid).   If you may be pregnant.  Injections take about  30 to 45 minutes. Most people spend up to 2 hours in the clinic or hospital.  Please call the Imaging Department at your exam site with any questions.            Aug 28, 2018 10:15 AM CDT   Hand Treatment with Sania Eller, OT   [facility] ([facility])    [address] [place] [address] [address] [place] [address]   [phone]            Aug 30, 2018 10:15 AM CDT   Hand Treatment with Sania Eller, OT   [facility] ([facility])    [address] [place] [address] [address] [place] [address]   [phone]            Sep 04, 2018 11:15 AM CDT   Hand Treatment with Sania Eller, OT   [facility] ([facility])    [address] [place] [address] [address] [place] [address]   [phone]            Sep 06, 2018 11:15 AM CDT   Hand Treatment with Sania Eller, OT   [facility] ([facility])    [address] [place] [address] [address] [place] [address]   [phone]            Sep 20, 2018 11:30 AM CDT   [facility] Visit with Steve Parks,    [facility]    [address] [address]   [phone]              Who to contact     If you have questions or need follow up information about today's clinic visit or your schedule please contact [facility] directly at [phone].  Normal or non-critical lab and imaging results will be communicated to you by [employer], letter or phone within 4 business days after the clinic has received the results. If you do not hear from us within 7 days, please contact the clinic through [employer] or phone. If you have a critical or abnormal lab result, we will notify you by phone as soon as possible.  Submit refill requests through [employer] or call your pharmacy and they will forward the refill request to us. Please allow 3 business days for your refill to be completed.           Additional Information About Your Visit        Care EveryWhere ID     This is your Care EveryWhere ID. This could be used by other organizations to access your [facility] medical records  [id number]        Your Vitals Were     Pulse                   57            Blood Pressure from Last 3 Encounters:   08/16/18 102/66   08/08/18 105/71   07/12/18 115/83    Weight from Last 3 Encounters:   08/08/18 92.3 kg (203 lb 8 oz)   07/12/18 92.5 kg (204 lb)   07/11/18 92.1 kg (203 lb)              Today, you had the following     No orders found for display       Primary Care Provider Office Phone # Fax #    Ciprianojosefina Arias, [phone]       [address] [place] [address]        Equal Access to Services     ELKIN PELAEZ : Hadii twila weaver hadasho Soomaali, waaxda luqadaha, qaybta kaalmada adeegyada, waxanitha kelly hayvictor m de la torre . So [facility] [phone].    ATENCIÓN: Si habla español, tiene a reaves disposición servicios gratuitos de asistencia lingüística. Llame al [phone].    We comply with applicable federal civil rights laws and [place] laws. We do not discriminate on the basis of race, color, national origin, age, disability, sex, sexual orientation, or gender identity.            Thank you!     Thank you for choosing [facility]  for your care. Our goal is always to provide you with excellent care. Hearing back from our patients is one way we can continue to improve our services. Please take a few minutes to complete the written survey that you may receive in the mail after your visit with us. Thank you!             Your Updated Medication List - Protect others around you: Learn how to safely use, store and throw away your medicines at www.disposemymeds.org.          This list is accurate as of 8/16/18  1:33 PM.  Always use your most recent med list.                   Brand Name Dispense Instructions for use Diagnosis    acetaminophen 325 MG tablet     TYLENOL    100 tablet    Take 3 tablets (975 mg) by mouth every 8 hours as needed for other (mild pain)    S/P cubital tunnel release       ASPIRIN PO      Take 81 mg by mouth        CRESTOR PO      Take 40 mg by mouth        DIPHENHYDRAMINE HCL PO      Take 50 mg by mouth every 6 hours as needed        fluocinolone 0.01 % solution    SYNALAR    60 mL    APPLY TOPICALLY TWICE A DAY    Intrinsic eczema       varenicline 1 MG tablet    CHANTIX    60 tablet    Take 1 tablet (1 mg) by mouth 2 times daily    Tobacco abuse

## 2018-08-16 NOTE — NURSING NOTE
24 gauge Peripheral IV inserted into right hand - attempts: 2    Pt recently had surgery on her left elbow so did not want us to use the left arm/hand for IV.     Shae Powell, RAYMONDN, RN-BC  Care Coordinator  [facility] [place]

## 2018-08-16 NOTE — PATIENT INSTRUCTIONS
[facility]   Radiofrequency Ablation (RFA) Discharge Instructions     Today you saw:    Dr. Misha Sheets    You should anticipate procedural pain for up to 2 weeks.   You may receive a prescription for pain medication and you should take this as directed.   It may take up to 8 weeks to receive relief from the RFA   Follow up with your provider in 6 weeks.   If you received sedation before, during or after your procedure, for the next 24 hours you shall NOT:    -Drive    -Operate machinery    -Drink alcohol    -Sign any legal documents   You may resume your normal diet   You may resume your regular medications after the procedure   Be cautious with walking. Numbness and/or weakness in the lower extremities may occur for up to 6-8 hours due to effect of local anesthetic  Avoid strenuous activity for the first 24 hours   You may resume your regular activities after 24 hours   You may shower, however no swimming, tub baths or hot tubs for 24 hours following your procedure   You may use ice packs 10-15 minutes three to four times a day at the injection site for comfort   Do not use heat to painful areas for 6 to 8 hours. This will give the local anesthetic time to wear off and prevent you from accidentally burning your skin.   You may use anti-inflammatory medications (such as Ibuprofen or Aleve or Advil) or Tylenol for pain control if necessary   If you experience any of the following, call the Pain Clinic during work hours at [phone] or the Provider Line after hours at [phone]:   -Fever over 100 degree F    -Swelling, bleeding, redness, drainage, warmth at the injection site    -Progressive weakness or numbness on your legs     -Loss of bowel or bladder function    -Unusual new onset of pain that is not improving

## 2018-08-16 NOTE — NURSING NOTE
Pre-procedure Intake    Have you been fasting? Yes    If yes, for how long? 6 hrs     Are you taking a prescribed blood thinner such as coumadin, Plavix, Xarelto?    No    If yes, when did you take your last dose? No     Do you take aspirin?  Yes     If cervical procedure, have you held aspirin for 6 day? Per patient, not 100% sure.     Do you have any allergies to contrast dye, iodine, steroid and/or numbing medications?  NO    Are you currently taking antibiotics or have an active infection?  NO    Have you had a fever/elevated temperature within the past week? NO    Are you currently taking oral steroids? NO    Do you have a ? Yes       Are you pregnant or breastfeeding?  NO    Are the vital signs normal?  Yes    Yonis Caraballo MA

## 2018-08-16 NOTE — NURSING NOTE
Discharge Information    IV Discontiued Time:  1540    Amount of Fluid Infused:  75 ccs    Discharge Criteria = When patient returns to baseline or as per MD order    Consciousness:  Pt is fully awake    Circulation:  BP +/- 20% of pre-procedure level    Respiration:  Patient is able to breathe deeply    O2 Sat:  Patient is able to maintain O2 Sat >92% on room air    Activity:  Moves 4 extremities on command    Ambulation:  Patient is able to stand and walk or stand and pivot into wheelchair    Dressing:  Clean/dry or No Dressing    Notes:   Discharge instructions and AVS given to patient    Patient meets criteria for discharge?  YES    Admitted to PCU?  No    Responsible adult present to accompany patient home?  Yes    Signature/Title:    RIP IRELAND RN Care Coordinator  [facility] [place]

## 2018-08-16 NOTE — NURSING NOTE
MD Time IN: 1355    Sedation start time:  1409  MD Time OUT:  1530    Medications given: Fentanyl 100 mcg IV; midazolam 1 mg IV  Intravenous fluids were administered, normal saline 75 cc's.    Sedation Level Achieved:  Minimal sedation      Dez Cruz RN  Care Coordinator   [facility] [place]

## 2018-08-17 NOTE — PROGRESS NOTES
Pre procedure Diagnosis: lumbosacral facet arthropathy, lumbosacral spondylosis  Post procedure Diagnosis: Same  Procedure performed: lumbosacral radiofrequency ablation at T12 L1, L2 fluoroscopically guided  Anesthesia: minimal sedation 10 0mg fentanyl and 1mg of midazolam   Complications: none  Operators: Luigi Wong MD     Indications:     Lani Barry is a 63 year old female hx of prior ant/post L4-S1 fusion.  who first started having problems with pain 24 yrs. The pt reports having a fall. She subsequently had LBP radiating to her LE. After her fusion the radiating pain improved. She now has mostly axial LBP radiating to her buttocks. The pain is mostly a constant deep aching pain, but occasionally sharp. The pain occasionally radiates to her upper buttocks. The pain is worse sitting for extended periods of time. The pain is worse when standing for extended periods of time.  The pain is with ext and flexion. The pain affects her ability to do her daily activities. The pain is slightly better with standing up straight or lying back. Of note pt used to have intermittent Lumbar RFAs(last 7/16) with significant relief for greater than 1yr.. The pt recently saw Dr. Janette tim for a bilateral facet joint injection( with sig relief for approx a month, but not as good as the RFA)    Options/alternatives, benefits and risks were discussed with the patient including bleeding, infection, no pain relief, tissue trauma, exposure to radiation, reaction to medications including seizure, spinal cord injury,increased pain after the procedure, weakness, numbness or sensory changes and headache.   We also discussed risks of sedation, including reaction to medications and cardiovascular collapse.    Questions were answered to her satisfaction and she agrees to proceed. Voluntary informed consent was obtained and signed.     Vitals were reviewed: Yes  Allergies were reviewed:  Yes   Medications were reviewed:  Yes    Pre-procedure pain score: 6/10    Procedure:  After obtaining signed, informed consent, the patient was brought into the procedure suite and was placed in a prone position on the procedure table.   A Pause for the Cause was performed.  Patient was prepped and draped in sterile fashion.     Susan Gupta had an IV line placed prior the procedure.  The C-arm was positioned in the R oblique view to afford optimal view of the L4-L5 vertebral bodies. Lidocaine 1% was used to anesthetize the skin at each level.  At each level, a 15cm, 20G curved radiofrequency cannula with a 10mm active tip was positioned, overlying the intersection of the transverse process and pedicle at L1, l2, l3 and was advanced under intermittent fluoroscopy until it contacted the transverse process and notch, and the tip slightly overran that process, just lateral to the mamillary process.  The position of each cannula was verified and optimized in the oblique view and AP views.    In the AP view, another cannula was placed at the sacral alar notch.      Each position was tested for motor and sensory stimulation, and was positioned so that stimulation was negative for stimuli outside the immediate area of the desired lesion.  Sensory stimulation was completed at 50 Hz, with max stimulation up to 0.3V.  Motor stimulation was completed at 2Hz, up to 1.5V.   Bupivacaine 0.25 % 1ml was injected at each level.  After allowing the local anesthetic to set up, a 90 second, 80 degree Celsius lesion was generated at all three levels.    The needles were then rotated within the pathway of the medial branch, and locations were evaluated with repeat imaging.  A second lesion was then generated at each location.    The procedure was then repeated on the left side, using the same technique as described above.  Sensory stimulation was positive at 0.3 V and motor stimulation was negative at 1.5 V except for multifidus movement.    The needles were flushed  with the local anesthetic as they were withdrawn.        Hemostasis was achieved, the area was cleaned, and bandaids were placed when appropriate.  The patient tolerated the procedure well, and was taken to the recovery room.    Images were saved to PACS.    Start sedation time: 1409  End sedation time: 1530    Post-procedure pain s core: 1/10  Follow-up includes:   -f/u phone call in one week  -post-procedure pain medications: Norco 5-3 25 mg 1tabs every 12 as needed dispense 28 tablets  -f/u with referring provider in 8 weeks    Luigi Wong MD  [facility]

## 2018-08-24 ENCOUNTER — TELEPHONE (OUTPATIENT)
Dept: PALLIATIVE MEDICINE | Facility: CLINIC | Age: 63
End: 2018-08-24

## 2018-08-24 NOTE — TELEPHONE ENCOUNTER
Patient had a  on 8/17/18.  Called patient for an update.      Left message that we were calling for an update about how she was doing after the procedure and that if she has any questions or concerns to call the clinic at [phone].

## 2018-08-28 ENCOUNTER — HOSPITAL ENCOUNTER (OUTPATIENT)
Dept: OCCUPATIONAL THERAPY | Facility: CLINIC | Age: 63
Setting detail: THERAPIES SERIES
End: 2018-08-28
Attending: ORTHOPAEDIC SURGERY
Payer: MEDICARE

## 2018-08-28 PROCEDURE — 40000839 ZZH STATISTIC HAND THERAPY VISIT: Performed by: OCCUPATIONAL THERAPIST

## 2018-08-28 PROCEDURE — 97140 MANUAL THERAPY 1/> REGIONS: CPT | Mod: GO | Performed by: OCCUPATIONAL THERAPIST

## 2018-08-28 PROCEDURE — 97110 THERAPEUTIC EXERCISES: CPT | Mod: GO | Performed by: OCCUPATIONAL THERAPIST

## 2018-08-28 PROCEDURE — 97035 APP MDLTY 1+ULTRASOUND EA 15: CPT | Mod: GO | Performed by: OCCUPATIONAL THERAPIST

## 2018-08-30 ENCOUNTER — HOSPITAL ENCOUNTER (OUTPATIENT)
Dept: OCCUPATIONAL THERAPY | Facility: CLINIC | Age: 63
Setting detail: THERAPIES SERIES
End: 2018-08-30
Attending: ORTHOPAEDIC SURGERY
Payer: MEDICARE

## 2018-08-30 PROCEDURE — G8988 SELF CARE GOAL STATUS: HCPCS | Mod: GO,CH | Performed by: OCCUPATIONAL THERAPIST

## 2018-08-30 PROCEDURE — G8987 SELF CARE CURRENT STATUS: HCPCS | Mod: GO,CI | Performed by: OCCUPATIONAL THERAPIST

## 2018-08-30 PROCEDURE — 40000839 ZZH STATISTIC HAND THERAPY VISIT: Performed by: OCCUPATIONAL THERAPIST

## 2018-08-30 PROCEDURE — 97110 THERAPEUTIC EXERCISES: CPT | Mod: GO | Performed by: OCCUPATIONAL THERAPIST

## 2018-08-30 NOTE — PROGRESS NOTES
Occupational Therapy Progress Note       08/30/18 1022   Notes   Note Type Progress Note   Signing Clinician's Name / Credentials   Signing clinician's name / credentials CHANO Huang   Session Number   Session Number 10   Additional Session Number Medicare   Providers   Referring Physician Dr. Steve Parks   Reporting Period   Reporting period from 07/17/18   Reporting period to 10/15/18   Progress Note/Recertification   Recertification Due Date 10/15/18   OT Medicare Only G-code   G-code Self Care   Self Care   Self Care Current Status,  (eval/re-eval & every progress note) CI: 1-19% impairment   Current Self Care Modifier Rationale UEFI, POG and clinical judgement   Self Care Goal,  (eval/re-eval, every progress note & discharge) CH: 0% impairment   General Information   Rxs Used 10   Medical Diagnosis Cubital tunnel syndrome; Left (G56.22)   Orders Evaluate And Treat As Indicated   Start Of Care Date 07/17/18   Onset date of current episode/exacerbation 05/18/18   Surgical procedure cubital tunnel release   Date of surgery 06/19/18   Precautions/Limitations No pushing, no pulling, no lifting with Left Upper extremity   Subjective Measures   Subjective The patient reports no pain , and increased sensation of the left arm and hand.  Only occasional left hand tingling and finger tip numbing.  Patient agrees to continue OT services for strengthening.   Initial Pain level 0/10   QuickDASH [Functional Disability Questionnaire; 0-100 (0=no dysfunction; 100=dysfunction)] (UEFI:  73/80 Improved)   Objective Measures   Objective Measures Strength   Strength   Location Left    50# and right 65#   Lateral Pinch 15# and right 18#   Therapeutic Exercise   Therapeutic Exercise Other Exercises/Activities   Skilled Interventions To Increase Tissue Extensibility;To Increase Blood Flow For Improved Healing;To Enhance Joint Rom   Minutes of Treatment 28   Other  Exer/Activities/Educ   Other Exer/Activities/Educ - All exercises are part of HEP unless otherwise noted Exercise 1;Exercise 2;Exercise 3;Exercise 4   Exercise 1 Educated, demonstrated and recommended for home programming   Description 1 CP increased numbing,  reposition and stretch during croteching.   Exercise 2 Reviewed UEFI, objectives and goals   Exercise 3 Wrist linda 1# wt.   Description 3 8x both directions   Exercise 4 2# wt wrist flexion/extension   Description 4 10x   Hand Goals   Hand Goals Household Chores;Enter Other Activity Here;Dressing   Dressing   Current Functional Task Dressing LB;Dressing UB   Previous Performance Level Independent   Goal Target Task Reach entire lower body;Don/Doff pants;Don/Doff bra   Goal Target Performance Level Mild difficulty   Due Date 10/15/18   Date Goal Met 08/28/18   Household Chores   Current Functional Task Reaching;Washing and drying dishes;Sweeping;Cleaning bathtub, refrigerator, stove;Washing floors;Cooking;Gripping;Carrying;Pushing   Previous Performance Level Independent   Current Performance Level Moderate difficulty  (advancing)   Goal Target Task Reach shelves to put dishes away;Hold and use vacuum ;Sweep floors;Open a tight or new jar; and turn to open a door;Carry a shopping bag   Goal Target Performance Level No difficulty   Due Date 10/15/18   Assessment   Clinical Impression(s) Comments Improved left UE functional use and some strength gain.  Patient would beneift from further OT services to address painfree functional reach for daily activities and overall left UE strengthening for BADL/IADLs   Response to Therapy: Improvements ROM;Flexibility;Pain;Self Care Skills;Sensitivity   Plan   Home program continue current home programming   Plan UE strengthneing and increase pain free functional reach and home programming   Total Session Time   Timed Code Treatment Minutes 30   Total Treatment Time (sum of timed and untimed services) 30       Thank  you for referring Lani  To OT services to assist with improve functional use of the left UE for BADL/IADLs.    If you have any questions or concerns, please contact me at [phone].    Sania Eller MA, OTR/L  [facility]

## 2018-09-06 ENCOUNTER — HOSPITAL ENCOUNTER (OUTPATIENT)
Dept: OCCUPATIONAL THERAPY | Facility: CLINIC | Age: 63
Setting detail: THERAPIES SERIES
End: 2018-09-06
Attending: ORTHOPAEDIC SURGERY
Payer: MEDICARE

## 2018-09-06 PROCEDURE — 97140 MANUAL THERAPY 1/> REGIONS: CPT | Mod: GO | Performed by: OCCUPATIONAL THERAPIST

## 2018-09-06 PROCEDURE — 40000839 ZZH STATISTIC HAND THERAPY VISIT: Performed by: OCCUPATIONAL THERAPIST

## 2018-09-06 PROCEDURE — 97035 APP MDLTY 1+ULTRASOUND EA 15: CPT | Mod: GO | Performed by: OCCUPATIONAL THERAPIST

## 2018-09-06 PROCEDURE — 97110 THERAPEUTIC EXERCISES: CPT | Mod: GO | Performed by: OCCUPATIONAL THERAPIST

## 2018-09-11 ENCOUNTER — HOSPITAL ENCOUNTER (OUTPATIENT)
Dept: OCCUPATIONAL THERAPY | Facility: CLINIC | Age: 63
Setting detail: THERAPIES SERIES
End: 2018-09-11
Attending: ORTHOPAEDIC SURGERY
Payer: MEDICARE

## 2018-09-11 PROCEDURE — 97035 APP MDLTY 1+ULTRASOUND EA 15: CPT | Mod: GO

## 2018-09-11 PROCEDURE — 40000839 ZZH STATISTIC HAND THERAPY VISIT

## 2018-09-11 PROCEDURE — 97140 MANUAL THERAPY 1/> REGIONS: CPT | Mod: GO

## 2018-09-11 PROCEDURE — 97110 THERAPEUTIC EXERCISES: CPT | Mod: GO

## 2018-09-18 ENCOUNTER — HOSPITAL ENCOUNTER (OUTPATIENT)
Dept: OCCUPATIONAL THERAPY | Facility: CLINIC | Age: 63
Setting detail: THERAPIES SERIES
End: 2018-09-18
Attending: ORTHOPAEDIC SURGERY
Payer: MEDICARE

## 2018-09-18 PROCEDURE — G8989 SELF CARE D/C STATUS: HCPCS | Mod: GO,CH | Performed by: OCCUPATIONAL THERAPIST

## 2018-09-18 PROCEDURE — 97110 THERAPEUTIC EXERCISES: CPT | Mod: GO | Performed by: OCCUPATIONAL THERAPIST

## 2018-09-18 PROCEDURE — G8988 SELF CARE GOAL STATUS: HCPCS | Mod: GO,CI | Performed by: OCCUPATIONAL THERAPIST

## 2018-09-18 PROCEDURE — 40000839 ZZH STATISTIC HAND THERAPY VISIT: Performed by: OCCUPATIONAL THERAPIST

## 2018-09-18 NOTE — PROGRESS NOTES
Discharge Progress Note       09/18/18 1114   Notes   Note Type Discharge Summary   Signing Clinician's Name / Credentials   Signing clinician's name / credentials DON Huang/L   Session Number   Session Number 13   Additional Session Number Medicare   Providers   Referring Physician Dr. Steve Parks   Reporting Period   Reporting period from 07/17/18   Reporting period to 10/15/18   Progress Note/Recertification   Progress Note Completed Date 09/18/18   Recertification Due Date 10/15/18   Self Care   Self Care Goal,  (eval/re-eval, every progress note & discharge) CI: 1-19% impairment   Self Care Discharge Status,  (discharge) CH: 0% impairment   Discharge Self Care Modifier Rationale UEFI, POG and clinical judgement   General Information   Rxs Used 13   Medical Diagnosis Cubital tunnel syndrome; Left (G56.22)   Orders Evaluate And Treat As Indicated   Start Of Care Date 07/17/18   Onset date of current episode/exacerbation 05/18/18   Surgical procedure cubital tunnel release   Date of surgery 06/19/18   Subjective Measures   Subjective The patient reports all feeling is returned.  Patient reports left radial pinch, hand cramping.  She requested discharge therapy  this date, feels she is back to normal.   Initial Pain level 0/10   Patient Reported % Functional Improvement 75%+   Functional Improvement Reported Leisure Activities;Self care activities;Gripping;Prehension;Carrying   QuickDASH [Functional Disability Questionnaire; 0-100 (0=no dysfunction; 100=dysfunction)] (UEFI: 80/80 Improved)   Objective Measures   Objective Measures Strength   Strength   Location Bilat    right 50# and left 78#   Other Exer/Activities/Educ   Exercise 1 Reviewed objectives, reviewed/updated goal and HEP   Exercise 2 Dynavision Mode A: left side half board   Description 2 Left hand only; 1) 41, 2) 53 Upper right quarant and lower quadrant: 1) 56 and 2) 51   Exercise 3 2# wt.     Description 3 bicep curls, shoulder flexion, wrist flexion/extension and pro/sup 20x each.   Hand Goals   Hand Goals Household Chores;Enter Other Activity Here;Dressing   Dressing   Current Functional Task Dressing LB;Dressing UB   Previous Performance Level Independent   Goal Target Task Reach entire lower body;Don/Doff pants;Don/Doff bra   Goal Target Performance Level Mild difficulty   Due Date 10/15/18   Date Goal Met 08/28/18   Household Chores   Current Functional Task Reaching;Washing and drying dishes;Sweeping;Cleaning bathtub, refrigerator, stove;Washing floors;Cooking;Gripping;Carrying;Pushing   Previous Performance Level Independent   Current Performance Level (advancing)   Goal Target Task Reach shelves to put dishes away;Hold and use vacuum ;Sweep floors;Open a tight or new jar; and turn to open a door;Carry a shopping bag   Goal Target Performance Level No difficulty   Due Date 10/15/18   Date Goal Met 09/18/18   Assessment   Clinical Impression(s) Comments Improved functional use of the left arm with no issues with daily tasks/activities.   Response to Therapy: Improvements ROM;Flexibility;Strength;Pain;Sensitivity;Self Care Skills   Plan   Home program Continue HEP/strengthening   Updates to plan of care Discharge this date   Total Session Time   Timed Code Treatment Minutes 30   Total Treatment Time (sum of timed and untimed services) 30         Thank you for referring Lani  To OT services to assist with improve pain free daily functional tasks/activities.    If you have any questions or concerns, please contact me at [phone].    Sania Eller MA, OTR/L  [facility]

## 2018-09-20 ENCOUNTER — OFFICE VISIT (OUTPATIENT)
Dept: ORTHOPEDICS | Facility: OTHER | Age: 63
End: 2018-09-20
Payer: MEDICARE

## 2018-09-20 ENCOUNTER — OFFICE VISIT (OUTPATIENT)
Dept: PALLIATIVE MEDICINE | Facility: CLINIC | Age: 63
End: 2018-09-20
Payer: MEDICARE

## 2018-09-20 VITALS
BODY MASS INDEX: 37.76 KG/M2 | WEIGHT: 200 LBS | TEMPERATURE: 97.5 F | DIASTOLIC BLOOD PRESSURE: 74 MMHG | HEIGHT: 61 IN | SYSTOLIC BLOOD PRESSURE: 115 MMHG

## 2018-09-20 VITALS
SYSTOLIC BLOOD PRESSURE: 115 MMHG | DIASTOLIC BLOOD PRESSURE: 74 MMHG | WEIGHT: 200 LBS | BODY MASS INDEX: 38.1 KG/M2 | HEART RATE: 78 BPM

## 2018-09-20 DIAGNOSIS — Z98.890 S/P CUBITAL TUNNEL RELEASE: Primary | ICD-10-CM

## 2018-09-20 DIAGNOSIS — M47.817 LUMBOSACRAL SPONDYLOSIS WITHOUT MYELOPATHY: Primary | ICD-10-CM

## 2018-09-20 PROCEDURE — 99214 OFFICE O/P EST MOD 30 MIN: CPT | Performed by: PAIN MEDICINE

## 2018-09-20 PROCEDURE — 99212 OFFICE O/P EST SF 10 MIN: CPT | Performed by: ORTHOPAEDIC SURGERY

## 2018-09-20 ASSESSMENT — PAIN SCALES - GENERAL
PAINLEVEL: NO PAIN (0)
PAINLEVEL: NO PAIN (0)

## 2018-09-20 NOTE — PROGRESS NOTES
Orthopedic Clinic Post-Operative Note    CHIEF COMPLAINT:   Chief Complaint   Patient presents with     RECHECK     left elbow follow up     Surgical Followup     DOS:6/19/2018~ Left ulnar nerve decompression at the elbow with subcutaneous transposition~12 weeks postop       HISTORY OF PRESENT ILLNESS  Very happy no pain.  Just some minimal residual tingling in her small and ring fingertips.  Much better than before.  Happy with her progress.    Patient's past medical, surgical, social and family histories reviewed.     Past Medical History:   Diagnosis Date     Arthritis      Cerebral infarction (H)      Morbid obesity (H) 10/25/2017       Past Surgical History:   Procedure Laterality Date     INJECT FACET JOINT N/A 7/10/2017    Procedure: INJECT FACET JOINT;  Lumbar 3-4 Bilateral Facet joint injection;  Surgeon: Misha Rae MD;  Location: [facility] OR     RELEASE ULNAR NERVE (ELBOW) Left 8/30/2016    Procedure: RELEASE ULNAR NERVE (ELBOW);  Surgeon: Steve Parks DO;  Location: [facility] OR     TRANSPOSITION ULNAR NERVE (ELBOW) Left 6/19/2018    Procedure: TRANSPOSITION ULNAR NERVE (ELBOW);  Left ulnar nerve decompression with transposition;  Surgeon: Steve Parks DO;  Location: [facility] OR       Medications:    Current Outpatient Prescriptions on File Prior to Visit:  acetaminophen (TYLENOL) 325 MG tablet Take 3 tablets (975 mg) by mouth every 8 hours as needed for other (mild pain)   ASPIRIN PO Take 81 mg by mouth   DIPHENHYDRAMINE HCL PO Take 50 mg by mouth every 6 hours as needed   fluocinolone (SYNALAR) 0.01 % solution APPLY TOPICALLY TWICE A DAY   Rosuvastatin Calcium (CRESTOR PO) Take 40 mg by mouth    varenicline (CHANTIX) 1 MG tablet Take 1 tablet (1 mg) by mouth 2 times daily     No current facility-administered medications on file prior to visit.     Allergies   Allergen Reactions     Bees        Social History     Occupational History     Not on file.     Social History Main Topics      "Smoking status: Current Some Day Smoker     Packs/day: 0.10     Types: Cigars     Smokeless tobacco: Never Used     Alcohol use No     Drug use: No     Sexual activity: Yes     Partners: Male       Family History   Problem Relation Age of Onset     Cancer Sister      Cerebrovascular Disease Sister      Diabetes Paternal Uncle        REVIEW OF SYSTEMS  General: negative for, night sweats, dizziness, fatigue  Resp: No shortness of breath and no cough  CV: negative for chest pain, syncope or near-syncope  GI: negative for nausea, vomiting and diarrhea  : negative for dysuria and hematuria  Musculoskeletal: as above  Neurologic: negative for syncope   Hematologic: negative for bleeding disorder    Physical Exam:  Vitals: /74  Temp 97.5  F (36.4  C) (Temporal)  Ht 5' 0.75\" (1.543 m)  Wt 200 lb (90.7 kg)  BMI 38.1 kg/m2  BMI= Body mass index is 38.1 kg/(m^2).  Constitutional: healthy, alert and no acute distress   Psychiatric: mentation appears normal and affect normal/bright  NEURO: no focal deficits  SKIN: .well healed, no erythema, no incision breakdown and no drainage.  JOINT/EXTREMITIES: Full elbow motion.  Fingers are warm and dry with good cap refill.  No ulnar motor weakness distally.  GAIT: not tested     Diagnostic Modalities:  None today.  Independent visualization of the images was performed.      Impression:   Chief Complaint   Patient presents with     RECHECK     left elbow follow up     Surgical Followup     DOS:6/19/2018~ Left ulnar nerve decompression at the elbow with subcutaneous transposition~12 weeks postop   Doing well.  Some residual tingling in the small and ring finger tips.  This may continue to improve with time    Plan:   Activity: Gradual return back to full activity  Staples/Sutures out: Not applicable.  Pain controlled: Yes. Continue to use: Nothing  Immobilzation: No  Physical Therapy: continue/transition to home exercise routine.   Return to clinic PRN, or sooner as needed for " changes.    Re-x-ray on return: No    Madan Parks D.O.

## 2018-09-20 NOTE — PATIENT INSTRUCTIONS
- Further procedures recommended:   - if pain returns please call  - Follow up: as needed    ----------------------------------------------------------------  Clinic Number:  [phone]   Call this number with any questions about your care and for scheduling assistance. Calls are returned Monday through Friday between 8 AM and 4:30 PM. We usually get back to you within 2 business days depending on the issue/request.       Medication refills:    For non-narcotic medications, call your pharmacy directly to request a refill. The pharmacy will contact the [facility] for authorization. Please allow 3-4 days for these refills to be processed.     For narcotic refills, call the clinic number or send a [employer] message. Please contact us 7-10 days before your refill is due. The message MUST include the name of the specific medication(s) requested and how you would like to receive the prescription(s). The options are as follows:    Pain Clinic staff can mail the prescription to your pharmacy. Please tell us the name of the pharmacy.    You may pick the prescription up at the Pain Clinic (tell us the location) or during a clinic visit with your pain provider    Pain Clinic staff can deliver the prescription to the [facility] pharmacy in the clinic building. Please tell us the location.      We believe regular attendance is key to your success in our program.    Any time you are unable to keep your appointment we ask that you call us at least 24 hours in advance to let us know. This will allow us to offer the appointment time to another patient.

## 2018-09-20 NOTE — LETTER
9/20/2018         RE: Lani Barry  [address] [facility] [address]        Dear Colleague,    Thank you for referring your patient, Lani Barry, to the [facility]. Please see a copy of my visit note below.    Orthopedic Clinic Post-Operative Note    CHIEF COMPLAINT:   Chief Complaint   Patient presents with     RECHECK     left elbow follow up     Surgical Followup     DOS:6/19/2018~ Left ulnar nerve decompression at the elbow with subcutaneous transposition~12 weeks postop       HISTORY OF PRESENT ILLNESS  Very happy no pain.  Just some minimal residual tingling in her small and ring fingertips.  Much better than before.  Happy with her progress.    Patient's past medical, surgical, social and family histories reviewed.     Past Medical History:   Diagnosis Date     Arthritis      Cerebral infarction (H)      Morbid obesity (H) 10/25/2017       Past Surgical History:   Procedure Laterality Date     INJECT FACET JOINT N/A 7/10/2017    Procedure: INJECT FACET JOINT;  Lumbar 3-4 Bilateral Facet joint injection;  Surgeon: Misha Rae MD;  Location: [facility] OR     RELEASE ULNAR NERVE (ELBOW) Left 8/30/2016    Procedure: RELEASE ULNAR NERVE (ELBOW);  Surgeon: Steve Parks DO;  Location: [facility] OR     TRANSPOSITION ULNAR NERVE (ELBOW) Left 6/19/2018    Procedure: TRANSPOSITION ULNAR NERVE (ELBOW);  Left ulnar nerve decompression with transposition;  Surgeon: Steve Parks DO;  Location: [facility] OR       Medications:    Current Outpatient Prescriptions on File Prior to Visit:  acetaminophen (TYLENOL) 325 MG tablet Take 3 tablets (975 mg) by mouth every 8 hours as needed for other (mild pain)   ASPIRIN PO Take 81 mg by mouth   DIPHENHYDRAMINE HCL PO Take 50 mg by mouth every 6 hours as needed   fluocinolone (SYNALAR) 0.01 % solution APPLY TOPICALLY TWICE A DAY   Rosuvastatin Calcium (CRESTOR PO) Take 40 mg by mouth    varenicline (CHANTIX) 1 MG tablet Take 1 tablet (1 mg) by  "mouth 2 times daily     No current facility-administered medications on file prior to visit.     Allergies   Allergen Reactions     Bees        Social History     Occupational History     Not on file.     Social History Main Topics     Smoking status: Current Some Day Smoker     Packs/day: 0.10     Types: Cigars     Smokeless tobacco: Never Used     Alcohol use No     Drug use: No     Sexual activity: Yes     Partners: Male       Family History   Problem Relation Age of Onset     Cancer Sister      Cerebrovascular Disease Sister      Diabetes Paternal Uncle        REVIEW OF SYSTEMS  General: negative for, night sweats, dizziness, fatigue  Resp: No shortness of breath and no cough  CV: negative for chest pain, syncope or near-syncope  GI: negative for nausea, vomiting and diarrhea  : negative for dysuria and hematuria  Musculoskeletal: as above  Neurologic: negative for syncope   Hematologic: negative for bleeding disorder    Physical Exam:  Vitals: /74  Temp 97.5  F (36.4  C) (Temporal)  Ht 5' 0.75\" (1.543 m)  Wt 200 lb (90.7 kg)  BMI 38.1 kg/m2  BMI= Body mass index is 38.1 kg/(m^2).  Constitutional: healthy, alert and no acute distress   Psychiatric: mentation appears normal and affect normal/bright  NEURO: no focal deficits  SKIN: .well healed, no erythema, no incision breakdown and no drainage.  JOINT/EXTREMITIES: Full elbow motion.  Fingers are warm and dry with good cap refill.  No ulnar motor weakness distally.  GAIT: not tested     Diagnostic Modalities:  None today.  Independent visualization of the images was performed.      Impression:   Chief Complaint   Patient presents with     RECHECK     left elbow follow up     Surgical Followup     DOS:6/19/2018~ Left ulnar nerve decompression at the elbow with subcutaneous transposition~12 weeks postop   Doing well.  Some residual tingling in the small and ring finger tips.  This may continue to improve with time    Plan:   Activity: Gradual return " back to full activity  Staples/Sutures out: Not applicable.  Pain controlled: Yes. Continue to use: Nothing  Immobilzation: No  Physical Therapy: continue/transition to home exercise routine.   Return to clinic PRN, or sooner as needed for changes.    Re-x-ray on return: No    Madan Parks D.O.    Again, thank you for allowing me to participate in the care of your patient.        Sincerely,        Steve Parks, DO

## 2018-09-20 NOTE — MR AVS SNAPSHOT
"              After Visit Summary   9/20/2018    Lani Barry    MRN: [id number]           Patient Information     Date Of Birth          1955        Visit Information        Provider Department      9/20/2018 1:30 PM Steve Parks DO [facility]         Follow-ups after your visit        Who to contact     If you have questions or need follow up information about today's clinic visit or your schedule please contact [facility] directly at [phone].  Normal or non-critical lab and imaging results will be communicated to you by MyChart, letter or phone within 4 business days after the clinic has received the results. If you do not hear from us within 7 days, please contact the clinic through MyChart or phone. If you have a critical or abnormal lab result, we will notify you by phone as soon as possible.  Submit refill requests through [employer] or call your pharmacy and they will forward the refill request to us. Please allow 3 business days for your refill to be completed.          Additional Information About Your Visit        Care EveryWhere ID     This is your Care EveryWhere ID. This could be used by other organizations to access your [place] medical records  [id number]        Your Vitals Were     Temperature Height BMI (Body Mass Index)             97.5  F (36.4  C) (Temporal) 1.543 m (5' 0.75\") 38.1 kg/m2          Blood Pressure from Last 3 Encounters:   09/20/18 115/74   09/20/18 115/74   08/16/18 102/55    Weight from Last 3 Encounters:   09/20/18 90.7 kg (200 lb)   09/20/18 90.7 kg (200 lb)   08/08/18 92.3 kg (203 lb 8 oz)              Today, you had the following     No orders found for display       Primary Care Provider Office Phone # Fax #    Cipriano Arias [phone]       [address] [place] [address]        Equal Access to Services     ELKIN PELAEZ AH: Angela lewis Sojose, waaxda luqadaha, qaybta " tona gallego kenton de la torre ah. So [facility] [phone].    ATENCIÓN: Si sumeet casarez, tiene a reaves disposición servicios gratuitos de asistencia lingüística. Lynda al [phone].    We comply with applicable federal civil rights laws and [place] laws. We do not discriminate on the basis of race, color, national origin, age, disability, sex, sexual orientation, or gender identity.            Thank you!     Thank you for choosing [facility]  for your care. Our goal is always to provide you with excellent care. Hearing back from our patients is one way we can continue to improve our services. Please take a few minutes to complete the written survey that you may receive in the mail after your visit with us. Thank you!             Your Updated Medication List - Protect others around you: Learn how to safely use, store and throw away your medicines at www.disposemymeds.org.          This list is accurate as of 9/20/18  1:30 PM.  Always use your most recent med list.                   Brand Name Dispense Instructions for use Diagnosis    acetaminophen 325 MG tablet    TYLENOL    100 tablet    Take 3 tablets (975 mg) by mouth every 8 hours as needed for other (mild pain)    S/P cubital tunnel release       ASPIRIN PO      Take 81 mg by mouth        CRESTOR PO      Take 40 mg by mouth        DIPHENHYDRAMINE HCL PO      Take 50 mg by mouth every 6 hours as needed        fluocinolone 0.01 % solution    SYNALAR    60 mL    APPLY TOPICALLY TWICE A DAY    Intrinsic eczema       varenicline 1 MG tablet    CHANTIX    60 tablet    Take 1 tablet (1 mg) by mouth 2 times daily    Tobacco abuse

## 2018-09-20 NOTE — PROGRESS NOTES
[facility] Follow Up    Chief Complaint:    Chief Complaint   Patient presents with     Pain       Interval:s/p lumbosacral radiofrequency ablation at T12 L1, L2 fluoroscopically guided  The pt reports amazing relief since RFA. She currently has 0 pain. The pt does not the initial week was terrible.  Currently, she denies any axial LBP. Of note she reports, the last RFA last at least 12 months.     She denies any weaknees. She denies any incontinence.    Hx  Lani Barry is a 63 year old female hx of prior ant/post L4-S1 fusion.  who first started having problems with pain 24 yrs.      Current treatments include:  none    Previous medication treatments included:  Took oxy and methocarbamol(all cubital tunnl release)     Other treatments have included:  Lani Barry has been seen at a pain clinic in the past.  [place]  PT: helped aquatic therapy   Injections: : lumbar facet joint injections at L3-4 bilaterally,  7/17  Facet 2016 short term relief  Lumbar RFA last 2016 with significant relief.       Past Medical History:  Past Medical History:   Diagnosis Date     Arthritis      Cerebral infarction (H)      Morbid obesity (H) 10/25/2017     Past Surgical History:  Past Surgical History:   Procedure Laterality Date     INJECT FACET JOINT N/A 7/10/2017    Procedure: INJECT FACET JOINT;  Lumbar 3-4 Bilateral Facet joint injection;  Surgeon: Misha Rae MD;  [facility]     RELEASE ULNAR NERVE (ELBOW) Left 8/30/2016    Procedure: RELEASE ULNAR NERVE (ELBOW);  Surgeon: Steve Parks DO;  [facility]     TRANSPOSITION ULNAR NERVE (ELBOW) Left 6/19/2018    Procedure: TRANSPOSITION ULNAR NERVE (ELBOW);  Left ulnar nerve decompression with transposition;  Surgeon: Steve Parks DO;  Location: [facility]     Medications:  Current Outpatient Prescriptions   Medication Sig Dispense Refill     ASPIRIN PO Take 81 mg by mouth       Rosuvastatin Calcium (CRESTOR PO)  Take 40 mg by mouth        varenicline (CHANTIX) 1 MG tablet Take 1 tablet (1 mg) by mouth 2 times daily 60 tablet 1     acetaminophen (TYLENOL) 325 MG tablet Take 3 tablets (975 mg) by mouth every 8 hours as needed for other (mild pain) (Patient not taking: Reported on 8/16/2018) 100 tablet 0     DIPHENHYDRAMINE HCL PO Take 50 mg by mouth every 6 hours as needed       fluocinolone (SYNALAR) 0.01 % solution APPLY TOPICALLY TWICE A DAY (Patient not taking: Reported on 9/20/2018) 60 mL 3     HYDROcodone-acetaminophen (NORCO) 5-325 MG per tablet Take 1 tablet by mouth every 12 hours as needed for pain For Post procedure (Patient not taking: Reported on 9/20/2018) 28 tablet 0     Allergies:     Allergies   Allergen Reactions     Bees      Social History:  Home situation:    Occupation/Schooling: retired   Tobacco use: yes  Alcohol use: no  Drug use: no hx of meth/cocaine long time ago   History of chemical dependency treatment: no    Family history:  Family History   Problem Relation Age of Onset     Cancer Sister      Cerebrovascular Disease Sister      Diabetes Paternal Uncle      Family history of headaches: no    Review of Systems:  Skin: negative  Eyes: negative  Ears/Nose/Throat: negative  Respiratory: No shortness of breath, dyspnea on exertion, cough, or hemoptysis  Cardiovascular: negative  Gastrointestinal: negative  Genitourinary: negative  Musculoskeletal: negative  Neurologic: negative  Psychiatric: negative  Hematologic/Lymphatic/Immunologic: negative  Endocrine: negative    Physical Exam:  Vitals:    09/20/18 1019   BP: 115/74   Pulse: 78   Weight: 90.7 kg (200 lb)     Exam:  Constitutional: healthy, alert and no distress  Head: normocephalic. Atraumatic.   Eyes: no redness or jaundice noted   ENT: oropharnx normal.  MMM.  Neck supple.    Cardiovascular: Negative JVD  Respiratory: Speaking in full sentences no accessory muscles use   gastrointestinal: soft, non-tender,  Skin: no suspicious lesions  or rashes  Psychiatric: mentation appears normal and affect normal/bright    Musculoskeletal exam:  Gait/Station/Posture: wnl    Lumbar spine:     ROM: sig improved   Neurologic exam:  CN:  Cranial nerves 2-12 are normal  Motor:  5/5 LE strength  Reflexes:     Achilles:  +2    Sensory:  (upper and lower extremities):   Light touch: normal     Diagnostic tests:  L1-L2:   Normal disc, facet joints, spinal canal and neural foramina.     L2-L3:  Mild annular disc bulge. Mild degenerative change in the facet  joints.     L3-L4:  Degeneration of the disc. Mild annular disc bulge. Moderate  degenerative change in the facet joints. Central canal normal. Neural  foramina appear patent.     L4-L5:  Anterior fusion. Posterior decompression and fusion. Central  canal and neural foramina appear patent.     L5-S1:  Anterior and posterior fusion. Central canal and neural  foramina are patent.         IMPRESSION:   1. Anterior fusions at L4-L5 and L5-S1. Posterior decompression and  fusion extending from L4-S1.  2. Mild degenerative change at L2-L3 and L3-L4.  3. No focal disc herniations are seen.    Assessment/Plan:  Lani Barry is a 63 year old female who presents with the complaints of Axial lbp.   Lani was seen today for pain.    Diagnoses and all orders for this visit:    Lumbosacral spondylosis without myelopathy     - Further procedures recommended:   - if pain returns please call  - Follow up: as needed    Total time spent was 30 minutes, and more than 50% of face to face time was spent counseling and/or coordination of care regarding principles of multidisciplinary care and medication management axial LBP    Luigi Wong MD  [facility]

## 2018-09-20 NOTE — MR AVS SNAPSHOT
After Visit Summary   9/20/2018    Lani Barry    MRN: [id number]           Patient Information     Date Of Birth          1955        Visit Information        Provider Department      9/20/2018 10:00 AM Luigi Wong MD [facility]        Today's Diagnoses     Lumbosacral spondylosis without myelopathy    -  1      Care Instructions    - Further procedures recommended:   - if pain returns please call  - Follow up: as needed    ----------------------------------------------------------------  Clinic Number:  [phone]   Call this number with any questions about your care and for scheduling assistance. Calls are returned Monday through Friday between 8 AM and 4:30 PM. We usually get back to you within 2 business days depending on the issue/request.       Medication refills:    For non-narcotic medications, call your pharmacy directly to request a refill. The pharmacy will contact the [facility] for authorization. Please allow 3-4 days for these refills to be processed.     For narcotic refills, call the clinic number or send a [employer] message. Please contact us 7-10 days before your refill is due. The message MUST include the name of the specific medication(s) requested and how you would like to receive the prescription(s). The options are as follows:    Pain Clinic staff can mail the prescription to your pharmacy. Please tell us the name of the pharmacy.    You may pick the prescription up at the Pain Clinic (tell us the location) or during a clinic visit with your pain provider    Pain Clinic staff can deliver the prescription to the [facility] in the clinic building. Please tell us the location.      We believe regular attendance is key to your success in our program.    Any time you are unable to keep your appointment we ask that you call us at least 24 hours in advance to let us know. This will allow us to offer the appointment time to another  patient.               Follow-ups after your visit        Your next 10 appointments already scheduled     Sep 20, 2018  1:30 PM CDT   [facility] DO   [facility]    [address]  [address]  [facility] [id number] [phone]              Who to contact     If you have questions or need follow up information about today's clinic visit or your schedule please contact [facility] directly at [phone].  Normal or non-critical lab and imaging results will be communicated to you by MyChart, letter or phone within 4 business days after the clinic has received the results. If you do not hear from us within 7 days, please contact the clinic through MyChart or phone. If you have a critical or abnormal lab result, we will notify you by phone as soon as possible.  Submit refill requests through [employer] or call your pharmacy and they will forward the refill request to us. Please allow 3 business days for your refill to be completed.          Additional Information About Your Visit        Care EveryWhere ID     This is your Care EveryWhere ID. This could be used by other organizations to access your [place] medical records  [id number]        Your Vitals Were     Pulse BMI (Body Mass Index)                78 38.1 kg/m2           Blood Pressure from Last 3 Encounters:   09/20/18 115/74   08/16/18 102/55   08/08/18 105/71    Weight from Last 3 Encounters:   09/20/18 90.7 kg (200 lb)   08/08/18 92.3 kg (203 lb 8 oz)   07/12/18 92.5 kg (204 lb)              Today, you had the following     No orders found for display       Primary Care Provider Office Phone # Fax #    Cipriano Miguel Arias [phone]        [address] [place] [address]        Equal Access to Services     ELKIN PELAEZ : Angela Funk, ronaldo garibay, qatona lay  ah. So [facility] [phone].    ATENCIÓN: Si sumeet casarez, tiene a reaves disposición servicios gratuitos de asistencia lingüística. Lynda vigil [phone].    We comply with applicable federal civil rights laws and [place] laws. We do not discriminate on the basis of race, color, national origin, age, disability, sex, sexual orientation, or gender identity.            Thank you!     Thank you for choosing [facility]  for your care. Our goal is always to provide you with excellent care. Hearing back from our patients is one way we can continue to improve our services. Please take a few minutes to complete the written survey that you may receive in the mail after your visit with us. Thank you!             Your Updated Medication List - Protect others around you: Learn how to safely use, store and throw away your medicines at www.disposemymeds.org.          This list is accurate as of 9/20/18 10:34 AM.  Always use your most recent med list.                   Brand Name Dispense Instructions for use Diagnosis    acetaminophen 325 MG tablet    TYLENOL    100 tablet    Take 3 tablets (975 mg) by mouth every 8 hours as needed for other (mild pain)    S/P cubital tunnel release       ASPIRIN PO      Take 81 mg by mouth        CRESTOR PO      Take 40 mg by mouth        DIPHENHYDRAMINE HCL PO      Take 50 mg by mouth every 6 hours as needed        fluocinolone 0.01 % solution    SYNALAR    60 mL    APPLY TOPICALLY TWICE A DAY    Intrinsic eczema       HYDROcodone-acetaminophen 5-325 MG per tablet    NORCO    28 tablet    Take 1 tablet by mouth every 12 hours as needed for pain For Post procedure    Lumbosacral spondylosis without myelopathy       varenicline 1 MG tablet    CHANTIX    60 tablet    Take 1 tablet (1 mg) by mouth 2 times daily    Tobacco abuse

## 2018-11-14 NOTE — PROGRESS NOTES
ORTHOPEDIC CLINIC CONSULT      Lani Barry is a 63 year old female who is seen in consultation at the request of Self.  History of Present illness:  Lani presents for evaluation of:   1.) Right knee pain  Onset:  11/12/18    Symptoms brought on by Jumproping.   Character:  constant and sharp.    Progression of symptoms:  worse.    Previous similar pain: no .   Pain Level:  0/10 - 9/10 depending on how she moves  Previous treatments:  rest/inactivity.  Currently on Blood thinners? No  Diagnosis of Diabetes? No      Orthopedic Consult      The above note was reviewed and verified.    At the time of the occurrence she recognized something to happen.  She is unable to pinpoint whether the popping or pain was immediately at the knee or in her calf.  She does report that she at times is able to walk on it with almost no discomfort and then will have a instantaneous severe excruciating pain.    She is status post left total knee replacement.  She reports that the left knee deterioration started with a meniscus tear.    Prior history of related problems:  No    Patient's past medical, surgical, social and family histories reviewed.     Past Medical History:   Diagnosis Date     Arthritis      Cerebral infarction (H)      Morbid obesity (H) 10/25/2017       Past Surgical History:   Procedure Laterality Date     INJECT FACET JOINT N/A 7/10/2017    Procedure: INJECT FACET JOINT;  Lumbar 3-4 Bilateral Facet joint injection;  Surgeon: Msiha Rae MD;  Location[facility]     RELEASE ULNAR NERVE (ELBOW) Left 8/30/2016    Procedure: RELEASE ULNAR NERVE (ELBOW);  Surgeon: Steve Parks DO;  Location[facility]     TRANSPOSITION ULNAR NERVE (ELBOW) Left 6/19/2018    Procedure: TRANSPOSITION ULNAR NERVE (ELBOW);  Left ulnar nerve decompression with transposition;  Surgeon: Steve Parks DO;  Location[facility]       Medications:    Current Outpatient Prescriptions on File Prior to Visit:  acetaminophen  "(TYLENOL) 325 MG tablet Take 3 tablets (975 mg) by mouth every 8 hours as needed for other (mild pain)   ASPIRIN PO Take 81 mg by mouth   DIPHENHYDRAMINE HCL PO Take 50 mg by mouth every 6 hours as needed   fluocinolone (SYNALAR) 0.01 % solution APPLY TOPICALLY TWICE A DAY   Rosuvastatin Calcium (CRESTOR PO) Take 40 mg by mouth    varenicline (CHANTIX) 1 MG tablet Take 1 tablet (1 mg) by mouth 2 times daily     No current facility-administered medications on file prior to visit.     Allergies   Allergen Reactions     Bees        Social History     Occupational History     Not on file.     Social History Main Topics     Smoking status: Current Some Day Smoker     Packs/day: 0.10     Types: Cigars     Smokeless tobacco: Never Used     Alcohol use No     Drug use: No     Sexual activity: Yes     Partners: Male       Family History   Problem Relation Age of Onset     Cancer Sister      Cerebrovascular Disease Sister      Diabetes Paternal Uncle        REVIEW OF SYSTEMS    General: negative for fever or fatigue    Psych:  negative for anxiety or depression     Ophthalmic:  Corrective lenses?  Yes    ENT:  Hearing difficulty? No    CV: negative for chest pain, venous insuffiencey     Endocrine:  negative for diabetes     Urology:  negative for kidney disease    Resp:  Normal respiratory effort     Skin: negative for cuts/sores or redness    Musculoskeletal: as above    Neurologic:negative for numbness/tingling    Hematologic: negative for bleeding disorder, does not use of prescription anticoagulants         Physical Exam:    Vitals: BP (!) 86/51 (BP Location: Left arm, Patient Position: Chair, Cuff Size: Adult Large)  Pulse 75  Ht 1.543 m (5' 0.75\")  Wt 90.7 kg (200 lb)  BMI 38.1 kg/m2  BMI= Body mass index is 38.1 kg/(m^2).    GENERAL APPEARANCE:  Healthy, alert, no distress    SKIN:  negative for suspicious lesions or rashes    NEURO: Normal strength and tone, mentation intact and speech normal    PSYCH:   " Mentation appears Normal and affect normal/bright    RESPIRATORY: negative for respiratory distress.    EYES: negative for Conjunctivitis    Cardiovascular: no Edema                dorsalis pedis Present                Toes warm and well perfused, brisk capillary refill.      GAIT: antalgic    JOINT/EXTREMITIES:    Pertinent findings are that she has very reproducible medial joint line tenderness it is quite severe.  She has a little bit of discomfort and fullness to the posterior calf in the mid calf region.  I do not detect any large effusion.  She is able to bring the knee to near full extension and flex to about 120 degrees.  She has no evidence of instability.    She is able to dorsiflex and plantarflex the foot quite easily.  And plantarflexion against my hand resistance does not exacerbate pain.      Radiographs: X-rays taken show no significant space narrowing.  The left knee shows the previous total joint replacement.    Independent visualization of the images was performed.    Impression:     ICD-10-CM    1. Acute pain of right knee M25.561 XR Knee Right G/E 4 Views     MR Knee Right w/o Contrast   2. Tear of medial meniscus of right knee, current, unspecified tear type, initial encounter S83.241A        With the mechanism of injury and physical findings today it is highly suggestive of a acute medial meniscus tear.            Plan:  The above was reviewed with Lani    We will obtain an MRI and proceed accordingly.  I did suggest that she unload her knee with crutches just for her own purposes.  She does have crutches at home and has used them before.    Return to clinic to discuss MRI results    Luís Kang MD

## 2018-11-15 ENCOUNTER — RADIANT APPOINTMENT (OUTPATIENT)
Dept: GENERAL RADIOLOGY | Facility: CLINIC | Age: 63
End: 2018-11-15
Attending: ORTHOPAEDIC SURGERY
Payer: MEDICARE

## 2018-11-15 ENCOUNTER — OFFICE VISIT (OUTPATIENT)
Dept: ORTHOPEDICS | Facility: CLINIC | Age: 63
End: 2018-11-15
Payer: MEDICARE

## 2018-11-15 VITALS
WEIGHT: 200 LBS | DIASTOLIC BLOOD PRESSURE: 51 MMHG | BODY MASS INDEX: 37.76 KG/M2 | HEART RATE: 75 BPM | HEIGHT: 61 IN | SYSTOLIC BLOOD PRESSURE: 86 MMHG

## 2018-11-15 DIAGNOSIS — M25.561 ACUTE PAIN OF RIGHT KNEE: ICD-10-CM

## 2018-11-15 DIAGNOSIS — S83.241A TEAR OF MEDIAL MENISCUS OF RIGHT KNEE, CURRENT, UNSPECIFIED TEAR TYPE, INITIAL ENCOUNTER: ICD-10-CM

## 2018-11-15 DIAGNOSIS — M25.561 ACUTE PAIN OF RIGHT KNEE: Primary | ICD-10-CM

## 2018-11-15 PROCEDURE — 73564 X-RAY EXAM KNEE 4 OR MORE: CPT | Mod: TC

## 2018-11-15 PROCEDURE — 99214 OFFICE O/P EST MOD 30 MIN: CPT | Performed by: ORTHOPAEDIC SURGERY

## 2018-11-15 ASSESSMENT — PAIN SCALES - GENERAL: PAINLEVEL: NO PAIN (0)

## 2018-11-15 NOTE — LETTER
11/15/2018         RE: Lani aBrry  [address] [facility] [address]        Dear Colleague,    Thank you for referring your patient, Lani Barry, to the [facility]. Please see a copy of my visit note below.    ORTHOPEDIC CLINIC CONSULT      Lani Barry is a 63 year old female who is seen in consultation at the request of Self.  History of Present illness:  Lani presents for evaluation of:   1.) Right knee pain  Onset:  11/12/18    Symptoms brought on by Jumproping.   Character:  constant and sharp.    Progression of symptoms:  worse.    Previous similar pain: no .   Pain Level:  0/10 - 9/10 depending on how she moves  Previous treatments:  rest/inactivity.  Currently on Blood thinners? No  Diagnosis of Diabetes? No      Orthopedic Consult      The above note was reviewed and verified.    At the time of the occurrence she recognized something to happen.  She is unable to pinpoint whether the popping or pain was immediately at the knee or in her calf.  She does report that she at times is able to walk on it with almost no discomfort and then will have a instantaneous severe excruciating pain.    She is status post left total knee replacement.  She reports that the left knee deterioration started with a meniscus tear.    Prior history of related problems:  No    Patient's past medical, surgical, social and family histories reviewed.     Past Medical History:   Diagnosis Date     Arthritis      Cerebral infarction (H)      Morbid obesity (H) 10/25/2017       Past Surgical History:   Procedure Laterality Date     INJECT FACET JOINT N/A 7/10/2017    Procedure: INJECT FACET JOINT;  Lumbar 3-4 Bilateral Facet joint injection;  Surgeon: Misha Rae MD;  [facility]     RELEASE ULNAR NERVE (ELBOW) Left 8/30/2016    Procedure: RELEASE ULNAR NERVE (ELBOW);  Surgeon: Steve Parks DO;  [facility]     TRANSPOSITION ULNAR NERVE (ELBOW) Left 6/19/2018    Procedure:  "TRANSPOSITION ULNAR NERVE (ELBOW);  Left ulnar nerve decompression with transposition;  Surgeon: Steve Parks DO;  [facility]       Medications:    Current Outpatient Prescriptions on File Prior to Visit:  acetaminophen (TYLENOL) 325 MG tablet Take 3 tablets (975 mg) by mouth every 8 hours as needed for other (mild pain)   ASPIRIN PO Take 81 mg by mouth   DIPHENHYDRAMINE HCL PO Take 50 mg by mouth every 6 hours as needed   fluocinolone (SYNALAR) 0.01 % solution APPLY TOPICALLY TWICE A DAY   Rosuvastatin Calcium (CRESTOR PO) Take 40 mg by mouth    varenicline (CHANTIX) 1 MG tablet Take 1 tablet (1 mg) by mouth 2 times daily     No current facility-administered medications on file prior to visit.     Allergies   Allergen Reactions     Bees        Social History     Occupational History     Not on file.     Social History Main Topics     Smoking status: Current Some Day Smoker     Packs/day: 0.10     Types: Cigars     Smokeless tobacco: Never Used     Alcohol use No     Drug use: No     Sexual activity: Yes     Partners: Male       Family History   Problem Relation Age of Onset     Cancer Sister      Cerebrovascular Disease Sister      Diabetes Paternal Uncle        REVIEW OF SYSTEMS    General: negative for fever or fatigue    Psych:  negative for anxiety or depression     Ophthalmic:  Corrective lenses?  Yes    ENT:  Hearing difficulty? No    CV: negative for chest pain, venous insuffiencey     Endocrine:  negative for diabetes     Urology:  negative for kidney disease    Resp:  Normal respiratory effort     Skin: negative for cuts/sores or redness    Musculoskeletal: as above    Neurologic:negative for numbness/tingling    Hematologic: negative for bleeding disorder, does not use of prescription anticoagulants         Physical Exam:    Vitals: BP (!) 86/51 (BP Location: Left arm, Patient Position: Chair, Cuff Size: Adult Large)  Pulse 75  Ht 1.543 m (5' 0.75\")  Wt 90.7 kg (200 lb)  BMI 38.1 " kg/m2  BMI= Body mass index is 38.1 kg/(m^2).    GENERAL APPEARANCE:  Healthy, alert, no distress    SKIN:  negative for suspicious lesions or rashes    NEURO: Normal strength and tone, mentation intact and speech normal    PSYCH:   Mentation appears Normal and affect normal/bright    RESPIRATORY: negative for respiratory distress.    EYES: negative for Conjunctivitis    Cardiovascular: no Edema                dorsalis pedis Present                Toes warm and well perfused, brisk capillary refill.      GAIT: antalgic    JOINT/EXTREMITIES:    Pertinent findings are that she has very reproducible medial joint line tenderness it is quite severe.  She has a little bit of discomfort and fullness to the posterior calf in the mid calf region.  I do not detect any large effusion.  She is able to bring the knee to near full extension and flex to about 120 degrees.  She has no evidence of instability.    She is able to dorsiflex and plantarflex the foot quite easily.  And plantarflexion against my hand resistance does not exacerbate pain.      Radiographs: X-rays taken show no significant space narrowing.  The left knee shows the previous total joint replacement.    Independent visualization of the images was performed.    Impression:     ICD-10-CM    1. Acute pain of right knee M25.561 XR Knee Right G/E 4 Views     MR Knee Right w/o Contrast   2. Tear of medial meniscus of right knee, current, unspecified tear type, initial encounter S83.241A        With the mechanism of injury and physical findings today it is highly suggestive of a acute medial meniscus tear.            Plan:  The above was reviewed with Lani    We will obtain an MRI and proceed accordingly.  I did suggest that she unload her knee with crutches just for her own purposes.  She does have crutches at home and has used them before.    Return to clinic to discuss MRI results    Luís Kang MD                    Again, thank you for allowing me to  participate in the care of your patient.        Sincerely,        Luís Kang MD

## 2018-11-15 NOTE — MR AVS SNAPSHOT
"              After Visit Summary   11/15/2018    Lani Barry    MRN: [id number]           Patient Information     Date Of Birth          1955        Visit Information        Provider Department      11/15/2018 10:10 AM Luís Kang MD [facility]        Today's Diagnoses     Acute pain of right knee    -  1    Tear of medial meniscus of right knee, current, unspecified tear type, initial encounter           Follow-ups after your visit        Future tests that were ordered for you today     Open Future Orders        Priority Expected Expires Ordered    MR Knee Right w/o Contrast Routine  11/15/2019 11/15/2018            Who to contact     If you have questions or need follow up information about today's clinic visit or your schedule please contact [facility] directly at [phone].  Normal or non-critical lab and imaging results will be communicated to you by [employer], letter or phone within 4 business days after the clinic has received the results. If you do not hear from us within 7 days, please contact the clinic through [employer] or phone. If you have a critical or abnormal lab result, we will notify you by phone as soon as possible.  Submit refill requests through [employer] or call your pharmacy and they will forward the refill request to us. Please allow 3 business days for your refill to be completed.          Additional Information About Your Visit        fluid Operationshart Information     [employer] lets you send messages to your doctor, view your test results, renew your prescriptions, schedule appointments and more. To sign up, go to [email or web address] . Click on \"Log in\" on the left side of the screen, which will take you to the Welcome page. Then click on \"Sign up Now\" on the right side of the page.     You will be asked to enter the access code listed below, as well as some personal information. Please follow the directions to create your username and password.   " "  Your access code is: [email or web address]  Expires: 2019 11:38 AM     Your access code will  in 90 days. If you need help or a new code, please call your [facility] clinic or [phone].        Care EveryWhere ID     This is your Care EveryWhere ID. This could be used by other organizations to access your [facility] medical records  [id number]        Your Vitals Were     Pulse Height BMI (Body Mass Index)             75 1.543 m (5' 0.75\") 38.1 kg/m2          Blood Pressure from Last 3 Encounters:   11/15/18 (!) 86/51   18 115/74   18 115/74    Weight from Last 3 Encounters:   11/15/18 90.7 kg (200 lb)   18 90.7 kg (200 lb)   18 90.7 kg (200 lb)               Primary Care Provider Office Phone # Fax #    Cipriano Miguel Arias,  [phone]       [address] [place] [address]        Equal Access to Services     [facility]: Hadii twila ku hadasho Soomaali, waaxda luqadaha, qaybta kaalmada ademary, tona acosta. So [facility] [phone].    ATENCIÓN: Si habla español, tiene a reaves disposición servicios gratuitos de asistencia lingüística. [facility] [phone].    We comply with applicable federal civil rights laws and [place] laws. We do not discriminate on the basis of race, color, national origin, age, disability, sex, sexual orientation, or gender identity.            Thank you!     Thank you for choosing [facility]  for your care. Our goal is always to provide you with excellent care. Hearing back from our patients is one way we can continue to improve our services. Please take a few minutes to complete the written survey that you may receive in the mail after your visit with us. Thank you!             Your Updated Medication List - Protect others around you: Learn how to safely use, store and throw away your medicines at www.disposemymeds.org.          This list is accurate as of 11/15/18 11:38 AM.  Always use your " most recent med list.                   Brand Name Dispense Instructions for use Diagnosis    acetaminophen 325 MG tablet    TYLENOL    100 tablet    Take 3 tablets (975 mg) by mouth every 8 hours as needed for other (mild pain)    S/P cubital tunnel release       ASPIRIN PO      Take 81 mg by mouth        CRESTOR PO      Take 40 mg by mouth        DIPHENHYDRAMINE HCL PO      Take 50 mg by mouth every 6 hours as needed        fluocinolone 0.01 % solution    SYNALAR    60 mL    APPLY TOPICALLY TWICE A DAY    Intrinsic eczema       varenicline 1 MG tablet    CHANTIX    60 tablet    Take 1 tablet (1 mg) by mouth 2 times daily    Tobacco abuse

## 2018-11-20 ENCOUNTER — HOSPITAL ENCOUNTER (OUTPATIENT)
Dept: MRI IMAGING | Facility: CLINIC | Age: 63
Discharge: HOME | End: 2018-11-20
Attending: ORTHOPAEDIC SURGERY | Admitting: ORTHOPAEDIC SURGERY
Payer: MEDICARE

## 2018-11-20 DIAGNOSIS — M25.561 ACUTE PAIN OF RIGHT KNEE: ICD-10-CM

## 2018-11-20 PROCEDURE — 73721 MRI JNT OF LWR EXTRE W/O DYE: CPT | Mod: RT

## 2018-11-27 ENCOUNTER — OFFICE VISIT (OUTPATIENT)
Dept: ORTHOPEDICS | Facility: CLINIC | Age: 63
End: 2018-11-27
Payer: MEDICARE

## 2018-11-27 VITALS
SYSTOLIC BLOOD PRESSURE: 120 MMHG | DIASTOLIC BLOOD PRESSURE: 70 MMHG | HEIGHT: 61 IN | WEIGHT: 200 LBS | BODY MASS INDEX: 37.76 KG/M2

## 2018-11-27 DIAGNOSIS — S83.241A TEAR OF MEDIAL MENISCUS OF RIGHT KNEE, CURRENT, UNSPECIFIED TEAR TYPE, INITIAL ENCOUNTER: Primary | ICD-10-CM

## 2018-11-27 PROCEDURE — 99213 OFFICE O/P EST LOW 20 MIN: CPT | Mod: 25 | Performed by: ORTHOPAEDIC SURGERY

## 2018-11-27 PROCEDURE — 20610 DRAIN/INJ JOINT/BURSA W/O US: CPT | Mod: RT | Performed by: ORTHOPAEDIC SURGERY

## 2018-11-27 RX ORDER — BETAMETHASONE SODIUM PHOSPHATE AND BETAMETHASONE ACETATE 3; 3 MG/ML; MG/ML
6 INJECTION, SUSPENSION INTRA-ARTICULAR; INTRALESIONAL; INTRAMUSCULAR; SOFT TISSUE ONCE
Qty: 2 ML | Refills: 0
Start: 2018-11-27 | End: 2019-03-08

## 2018-11-27 ASSESSMENT — PAIN SCALES - GENERAL: PAINLEVEL: MODERATE PAIN (4)

## 2018-11-27 NOTE — MR AVS SNAPSHOT
"              After Visit Summary   2018    Lani Barry    MRN: [id number]           Patient Information     Date Of Birth          1955        Visit Information        Provider Department      2018 8:40 AM Luís Kang MD [facility]         Follow-ups after your visit        Who to contact     If you have questions or need follow up information about today's clinic visit or your schedule please contact [facility] directly at [phone].  Normal or non-critical lab and imaging results will be communicated to you by [employer], letter or phone within 4 business days after the clinic has received the results. If you do not hear from us within 7 days, please contact the clinic through [employer] or phone. If you have a critical or abnormal lab result, we will notify you by phone as soon as possible.  Submit refill requests through [employer] or call your pharmacy and they will forward the refill request to us. Please allow 3 business days for your refill to be completed.          Additional Information About Your Visit        [employer] Information     [employer] lets you send messages to your doctor, view your test results, renew your prescriptions, schedule appointments and more. To sign up, go to [email or web address] . Click on \"Log in\" on the left side of the screen, which will take you to the Welcome page. Then click on \"Sign up Now\" on the right side of the page.     You will be asked to enter the access code listed below, as well as some personal information. Please follow the directions to create your username and password.     Your access code is: [email or web address]  Expires: 2019 11:38 AM     Your access code will  in 90 days. If you need help or a new code, please call your [facility] or [phone].        Care EveryWhere ID     This is your Care EveryWhere ID. This could be used by other organizations to access your [facility] medical " "records  [id number]        Your Vitals Were     Height BMI (Body Mass Index)                1.543 m (5' 0.75\") 38.1 kg/m2           Blood Pressure from Last 3 Encounters:   11/27/18 120/70   11/15/18 (!) 86/51   09/20/18 115/74    Weight from Last 3 Encounters:   11/27/18 90.7 kg (200 lb)   11/15/18 90.7 kg (200 lb)   09/20/18 90.7 kg (200 lb)              Today, you had the following     No orders found for display       Primary Care Provider Office Phone # Fax #    Cipriano Arias, [phone]       [address] [place] [address]        Equal Access to Services     ELKIN PELAEZ : Angela lewis Sojose, waaxda luqadaha, qaybta kaalmada adeegyada, tona de la torre . So [facility] [phone].    ATENCIÓN: Si habla español, tiene a reaves disposición servicios gratuitos de asistencia lingüística. Llame al [phone].    We comply with applicable federal civil rights laws and [place] laws. We do not discriminate on the basis of race, color, national origin, age, disability, sex, sexual orientation, or gender identity.            Thank you!     Thank you for choosing [facility]  for your care. Our goal is always to provide you with excellent care. Hearing back from our patients is one way we can continue to improve our services. Please take a few minutes to complete the written survey that you may receive in the mail after your visit with us. Thank you!             Your Updated Medication List - Protect others around you: Learn how to safely use, store and throw away your medicines at www.disposemymeds.org.          This list is accurate as of 11/27/18  8:57 AM.  Always use your most recent med list.                   Brand Name Dispense Instructions for use Diagnosis    acetaminophen 325 MG tablet    TYLENOL    100 tablet    Take 3 tablets (975 mg) by mouth every 8 hours as needed for other (mild pain)    S/P cubital tunnel release       " ASPIRIN PO      Take 81 mg by mouth        CRESTOR PO      Take 40 mg by mouth        DIPHENHYDRAMINE HCL PO      Take 50 mg by mouth every 6 hours as needed        fluocinolone 0.01 % solution    SYNALAR    60 mL    APPLY TOPICALLY TWICE A DAY    Intrinsic eczema       varenicline 1 MG tablet    CHANTIX    60 tablet    Take 1 tablet (1 mg) by mouth 2 times daily    Tobacco abuse

## 2018-11-27 NOTE — PROGRESS NOTES
"Office Visit-Follow up  HISTORY OF PRESENT ILLNESS:    Lani Barry is a 63 year old female who is seen in follow up for   Chief Complaint   Patient presents with     RECHECK     Right knee pain.  Onset:  11/12/18.  Symptoms brought on by Jump roping.        Patient presents with:  RECHECK: Right knee pain.  Onset:  11/12/18.  Symptoms brought on by Jump roping.       She is returning to discuss her MRI results.  We do verify that she is about 2 and half weeks after the initial onset of her symptoms.  Present symptoms: The symptoms have improved but not resolved.  She now points to the lateral aspect of the knee as the primary area of discomfort.  After last office visit she did slipped going down some steps and wrenched her back.  She is now having some right leg sciatica symptoms.  She did have a previous 2 level low spine effusion in the distant past.  Treatments tried to this point: No recent treatment on the right knee.    REVIEW OF SYSTEMS    General: negative for, night sweats, dizziness, fatigue  Resp: No shortness of breath and no cough  CV: negative for chest pain, syncope or near-syncope  GI: negative for nausea, vomiting and diarrhea  : negative for dysuria and hematuria  Musculoskeletal: as above  Neurologic: negative for syncope   Hematologic: negative for bleeding disorder    Physical Exam:    Vitals: /70  Ht 1.543 m (5' 0.75\")  Wt 90.7 kg (200 lb)  BMI 38.1 kg/m2  BMI= Body mass index is 38.1 kg/(m^2).  Constitutional: healthy, alert and no acute distress   Psychiatric: mentation appears normal and affect normal/bright  NEURO: no focal deficits  SKIN: no excoriation or erythema. No signs of infection.    GAIT: antalgic    JOINT/EXTREMITIES:     Examination of her right knee shows she has diffuse tenderness.  This includes both medial and lateral joint lines.  She does not tolerate palpation very significantly.  Range of motion is acceptable at near full extension to past 90 " degrees.    IMAGING INTERPRETATION: The MRI was reviewed.  Report was reviewed.  She has tricompartmental chondromalacia.  She is got evidence of a lateral meniscus tear involving primarily the anterior horn.  This is a degenerative type tear.  She also has extrusion of the lateral meniscus.  This means to me that the anterior meniscal tear probably results of the nonfunctional lateral meniscus.  She has cystic changes about the ACL.       Impression:      ICD-10-CM    1. Tear of medial meniscus of right knee, current, unspecified tear type, initial encounter S83.241A Large Joint/Bursa injection and/or drainage (Shoulder, Knee)     Celestone 6 mg Inj. []     betamethasone acet & sod phos (CELESTONE) 6 (3-3) MG/ML SUSP injection       Patient has degenerative knee.  She does have meniscal pathology.  However the type of meniscus tear may not be amenable to arthroscopic debridement alleviating all of her symptoms.  She has a history of having had meniscus tear in the left knee that did not respond arthroscopic procedure and therefore 4 months after the scope had a left total knee replacement.    Plan:   The above was reviewed with Lani and her .     Reviewed the entire clinical scenario with the findings.  Options of either injection therapy, arthroscopic debridement or total joint replacement all reviewed.  I think in her instance the most direct and simplest approach is to inject her knee with cortisone and see if her symptoms settle down enough that she can function.    There are both quite open to this approach.  Therefore after consent the knee was injected by my assistant Antoinette Floyd using 2 cc of Celestone and 5 cc of lidocaine.  Anterolateral approach was used.  She tolerated this well.    Suggest a follow-up in about 4 weeks if her symptoms are not improved.    In the meantime the patient is going to return to her pain physician in [place] that has been helping her with low back issues in the  past.      Return to clinic 4, weeks, or PRN    Luís Kang MD

## 2018-11-27 NOTE — LETTER
"    11/27/2018         RE: Lani Barry  [address] [facility] [address]        Dear Colleague,    Thank you for referring your patient, Lani Barry, to the [facility]. Please see a copy of my visit note below.    Office Visit-Follow up  HISTORY OF PRESENT ILLNESS:    Lani Barry is a 63 year old female who is seen in follow up for   Chief Complaint   Patient presents with     RECHECK     Right knee pain.  Onset:  11/12/18.  Symptoms brought on by Jump roping.        Patient presents with:  RECHECK: Right knee pain.  Onset:  11/12/18.  Symptoms brought on by Jump roping.       She is returning to discuss her MRI results.  We do verify that she is about 2 and half weeks after the initial onset of her symptoms.  Present symptoms: The symptoms have improved but not resolved.  She now points to the lateral aspect of the knee as the primary area of discomfort.  After last office visit she did slipped going down some steps and wrenched her back.  She is now having some right leg sciatica symptoms.  She did have a previous 2 level low spine effusion in the distant past.  Treatments tried to this point: No recent treatment on the right knee.    REVIEW OF SYSTEMS    General: negative for, night sweats, dizziness, fatigue  Resp: No shortness of breath and no cough  CV: negative for chest pain, syncope or near-syncope  GI: negative for nausea, vomiting and diarrhea  : negative for dysuria and hematuria  Musculoskeletal: as above  Neurologic: negative for syncope   Hematologic: negative for bleeding disorder    Physical Exam:    Vitals: /70  Ht 1.543 m (5' 0.75\")  Wt 90.7 kg (200 lb)  BMI 38.1 kg/m2  BMI= Body mass index is 38.1 kg/(m^2).  Constitutional: healthy, alert and no acute distress   Psychiatric: mentation appears normal and affect normal/bright  NEURO: no focal deficits  SKIN: no excoriation or erythema. No signs of infection.    GAIT: antalgic    JOINT/EXTREMITIES: "     Examination of her right knee shows she has diffuse tenderness.  This includes both medial and lateral joint lines.  She does not tolerate palpation very significantly.  Range of motion is acceptable at near full extension to past 90 degrees.    IMAGING INTERPRETATION: The MRI was reviewed.  Report was reviewed.  She has tricompartmental chondromalacia.  She is got evidence of a lateral meniscus tear involving primarily the anterior horn.  This is a degenerative type tear.  She also has extrusion of the lateral meniscus.  This means to me that the anterior meniscal tear probably results of the nonfunctional lateral meniscus.  She has cystic changes about the ACL.       Impression:      ICD-10-CM    1. Tear of medial meniscus of right knee, current, unspecified tear type, initial encounter S83.241A Large Joint/Bursa injection and/or drainage (Shoulder, Knee)     Celestone 6 mg Inj. []     betamethasone acet & sod phos (CELESTONE) 6 (3-3) MG/ML SUSP injection       Patient has degenerative knee.  She does have meniscal pathology.  However the type of meniscus tear may not be amenable to arthroscopic debridement alleviating all of her symptoms.  She has a history of having had meniscus tear in the left knee that did not respond arthroscopic procedure and therefore 4 months after the scope had a left total knee replacement.    Plan:   The above was reviewed with Lani and her .     Reviewed the entire clinical scenario with the findings.  Options of either injection therapy, arthroscopic debridement or total joint replacement all reviewed.  I think in her instance the most direct and simplest approach is to inject her knee with cortisone and see if her symptoms settle down enough that she can function.    There are both quite open to this approach.  Therefore after consent the knee was injected by my assistant Antoinette Floyd using 2 cc of Celestone and 5 cc of lidocaine.  Anterolateral approach was used.   She tolerated this well.    Suggest a follow-up in about 4 weeks if her symptoms are not improved.    In the meantime the patient is going to return to her pain physician in [place] that has been helping her with low back issues in the past.      Return to clinic 4, weeks, or PRN    Luís Kang MD    Again, thank you for allowing me to participate in the care of your patient.        Sincerely,        Luís aKng MD

## 2018-12-06 ENCOUNTER — OFFICE VISIT (OUTPATIENT)
Dept: NEUROSURGERY | Facility: CLINIC | Age: 63
End: 2018-12-06
Payer: MEDICARE

## 2018-12-06 VITALS
HEART RATE: 82 BPM | WEIGHT: 205 LBS | SYSTOLIC BLOOD PRESSURE: 135 MMHG | HEIGHT: 61 IN | BODY MASS INDEX: 38.71 KG/M2 | DIASTOLIC BLOOD PRESSURE: 78 MMHG

## 2018-12-06 DIAGNOSIS — Z98.1 HISTORY OF LUMBAR FUSION: Primary | ICD-10-CM

## 2018-12-06 PROCEDURE — 99213 OFFICE O/P EST LOW 20 MIN: CPT | Performed by: NEUROLOGICAL SURGERY

## 2018-12-06 ASSESSMENT — PAIN SCALES - GENERAL: PAINLEVEL: MODERATE PAIN (5)

## 2018-12-06 NOTE — PATIENT INSTRUCTIONS
Today's Visit    1. Ordered a lumbar MRI AND CT scan. To schedule, please call [phone]. You should schedule this within one week. We will call you with the results. If you don't hear from us 7 days after the scan, please call us.    Please call our clinic with any questions or concerns    Radha WARREN RN ([facility] Nurse)  [facility]  [place] [address]  [place] [address]  T:  [phone]  F:  [phone]

## 2018-12-06 NOTE — NURSING NOTE
"Lani Barry is a 63 year old female who presents for:  Chief Complaint   Patient presents with     Neurologic Problem     LBP, prior 2014 fusion, recently fell        Initial Vitals:  /78 (BP Location: Left arm, Patient Position: Chair, Cuff Size: Adult Large)  Pulse 82  Ht 5' 0.75\" (1.543 m)  Wt 205 lb (93 kg)  BMI 39.05 kg/m2 Estimated body mass index is 39.05 kg/(m^2) as calculated from the following:    Height as of this encounter: 5' 0.75\" (1.543 m).    Weight as of this encounter: 205 lb (93 kg).. Body surface area is 2 meters squared. BP completed using cuff size: large  Moderate Pain (5)    Do you feel safe in your environment?  Yes  Do you need any refills today? No    Nursing Comments:         Loreto Andrews  "

## 2018-12-06 NOTE — LETTER
12/6/2018         RE: Lani Barry  [address] [facility] [address]        Dear Colleague,    Thank you for referring your patient, Lani Barry, to the [facility]. Please see a copy of my visit note below.    62F w/ hx A/P lumbar fusion, presents with back and leg pain.  Underwent A/P lumbar fusion in 2014 in [place], has undergone lumbar MBB every 6 months since then with a pain doctor in [place].  Over the last year, progressive left low back pain, and aching pain radiating to the posterior thigh and calf.  No weakness or sensory changes.    Returns for follow-up.  Has been managing successfully with therapy and pain management.  2 weeks ago, had a fall while going down the stairs.  Since then having 8 out of 10 aching localized low back pain.  Initially was having sharp right posterior leg pain radiating to the calf, but this is subsiding.    Past Medical History:   Diagnosis Date     Arthritis      Cerebral infarction (H)      Morbid obesity (H) 10/25/2017     Past Surgical History:   Procedure Laterality Date     INJECT FACET JOINT N/A 7/10/2017    Procedure: INJECT FACET JOINT;  Lumbar 3-4 Bilateral Facet joint injection;  Surgeon: Misha Rae MD;  Location: [facility] OR     RELEASE ULNAR NERVE (ELBOW) Left 8/30/2016    Procedure: RELEASE ULNAR NERVE (ELBOW);  Surgeon: Steve Parks DO;  Location: [facility] OR     TRANSPOSITION ULNAR NERVE (ELBOW) Left 6/19/2018    Procedure: TRANSPOSITION ULNAR NERVE (ELBOW);  Left ulnar nerve decompression with transposition;  Surgeon: Steve Parks DO;  Location: [facility] OR     Social History     Social History     Marital status:      Spouse name: N/A     Number of children: 3     Years of education: N/A     Occupational History     Not on file.     Social History Main Topics     Smoking status: Current Some Day Smoker     Packs/day: 0.10     Types: Cigars     Smokeless tobacco: Never Used     Alcohol use No     Drug use: No      "Sexual activity: Yes     Partners: Male     Other Topics Concern     Not on file     Social History Narrative     Family History   Problem Relation Age of Onset     Cancer Sister      Cerebrovascular Disease Sister      Diabetes Paternal Uncle         ROS: 10 point ROS neg other than the symptoms noted above in the HPI.    Physical Exam  /78 (BP Location: Left arm, Patient Position: Chair, Cuff Size: Adult Large)  Pulse 82  Ht 1.543 m (5' 0.75\")  Wt 93 kg (205 lb)  BMI 39.05 kg/m2  HEENT:  Normocephalic, atraumatic.  PERRLA.  EOM s intact.  Visual fields full to gross exam  Neck:  Supple, non-tender, without lymphadenopathy.  Heart:  No peripheral edema  Lungs:  No SOB  Abdomen:  Non-distended.   Skin:  Warm and dry.  Extremities:  No edema, cyanosis or clubbing.    NEUROLOGICAL EXAMINATION:     Mental status:  Alert and Oriented x 3, speech is fluent.  Cranial nerves:  II-XII intact.   Motor:    Shoulder Abduction:  Right:  5/5   Left:  5/5  Biceps:                      Right:  5/5   Left:  5/5  Triceps:                     Right:  5/5   Left:  5/5  Wrist Extensors:       Right:  5/5   Left:  5/5  Wrist Flexors:           Right:  5/5   Left:  5/5  interosseus :            Right:  5/5   Left:  5/5   Hip Flexor:                Right: 5/5  Left:  5/5  Hip Adductor:             Right:  5/5  Left:  5/5  Hip Abductor:             Right:  5/5  Left:  5/5  Gastroc Soleus:        Right:  5/5  Left:  5/5  Tib/Ant:                      Right:  5/5  Left:  5/5  EHL:                     Right:  5/5  Left:  5/5  Sensation:  Intact  Reflexes:  Negative Babinski.  Negative Clonus.  Negative James's.  Coordination:  Smooth finger to nose testing.   Negative pronator drift.  Smooth tandem walking.    A/P:  62F w/ hx A/P lumbar fusion, presents with back and leg pain    Will obtain new imaging given the recent trauma and fall  We will order a CT and MRI  If stable, will plan for a new course of physical therapy    Again, " thank you for allowing me to participate in the care of your patient.        Sincerely,        Doug Ashford MD

## 2018-12-10 ENCOUNTER — HOSPITAL ENCOUNTER (OUTPATIENT)
Dept: CT IMAGING | Facility: CLINIC | Age: 63
Discharge: HOME | End: 2018-12-10
Attending: NEUROLOGICAL SURGERY | Admitting: NEUROLOGICAL SURGERY
Payer: MEDICARE

## 2018-12-10 ENCOUNTER — HOSPITAL ENCOUNTER (OUTPATIENT)
Dept: MRI IMAGING | Facility: CLINIC | Age: 63
End: 2018-12-10
Attending: NEUROLOGICAL SURGERY
Payer: MEDICARE

## 2018-12-10 DIAGNOSIS — Z98.1 HISTORY OF LUMBAR FUSION: ICD-10-CM

## 2018-12-10 PROCEDURE — 72158 MRI LUMBAR SPINE W/O & W/DYE: CPT

## 2018-12-10 PROCEDURE — 25500064 ZZH RX 255 OP 636: Performed by: RADIOLOGY

## 2018-12-10 PROCEDURE — A9585 GADOBUTROL INJECTION: HCPCS | Performed by: RADIOLOGY

## 2018-12-10 PROCEDURE — 72131 CT LUMBAR SPINE W/O DYE: CPT

## 2018-12-10 RX ORDER — GADOBUTROL 604.72 MG/ML
10 INJECTION INTRAVENOUS ONCE
Status: COMPLETED | OUTPATIENT
Start: 2018-12-10 | End: 2018-12-10

## 2018-12-10 RX ADMIN — GADOBUTROL 9 ML: 604.72 INJECTION INTRAVENOUS at 12:47

## 2018-12-11 ENCOUNTER — TELEPHONE (OUTPATIENT)
Dept: NEUROSURGERY | Facility: CLINIC | Age: 63
End: 2018-12-11

## 2018-12-11 DIAGNOSIS — Z98.1 HISTORY OF LUMBAR FUSION: Primary | ICD-10-CM

## 2018-12-11 NOTE — TELEPHONE ENCOUNTER
Dr. Ashford reviewed recent imaging. MR and CT lumbar overall shows no acute findings, hardware is stable. Recommend course of PT at this time.     Discussed with patient and she voiced agreement with plan. Placed referral to PT. Advised patient to call back with questions/concerns.

## 2018-12-20 ENCOUNTER — HOSPITAL ENCOUNTER (OUTPATIENT)
Dept: PHYSICAL THERAPY | Facility: OTHER | Age: 63
Setting detail: THERAPIES SERIES
End: 2018-12-20
Attending: NEUROLOGICAL SURGERY
Payer: MEDICARE

## 2018-12-20 DIAGNOSIS — Z98.1 HISTORY OF LUMBAR FUSION: ICD-10-CM

## 2018-12-20 PROCEDURE — 97110 THERAPEUTIC EXERCISES: CPT | Mod: GP | Performed by: PHYSICAL THERAPIST

## 2018-12-20 PROCEDURE — G8978 MOBILITY CURRENT STATUS: HCPCS | Mod: GP,CK | Performed by: PHYSICAL THERAPIST

## 2018-12-20 PROCEDURE — 97161 PT EVAL LOW COMPLEX 20 MIN: CPT | Mod: GP | Performed by: PHYSICAL THERAPIST

## 2018-12-20 PROCEDURE — G8979 MOBILITY GOAL STATUS: HCPCS | Mod: GP,CJ | Performed by: PHYSICAL THERAPIST

## 2018-12-20 NOTE — PROGRESS NOTES
[facility]          OUTPATIENT PHYSICAL THERAPY ORTHOPEDIC EVALUATION  PLAN OF TREATMENT FOR OUTPATIENT REHABILITATION  (COMPLETE FOR INITIAL CLAIMS ONLY)  Patient's Last Name, First Name, M.I.  YOB: 1955  JhonnyLani  [place]    Provider s Name:  [facility]   Medical Record No.  [id number]   Start of Care Date:  12/20/18   Onset Date:  11/15/18   Type:     _X__PT   ___OT   ___SLP Medical Diagnosis:  history of lumbar fusion z98.1     PT Diagnosis:  s/p lumbar fusion 2014 , strain of low back due to fall    Visits from SOC:  1      _________________________________________________________________________________  Plan of Treatment/Functional Goals:  ADL retraining, ROM, strengthening, stretching           Goals  Goal Identifier: 1  Goal Description: instruction in HEP and compliant with it 5 of 7 days   Target Date: 02/20/19    Goal Identifier: 2  Goal Description: patient to return to baseline pain level currently 0-7/10 goal is 0-3/10  Target Date: 02/20/19                                                                      Therapy Frequency:     Predicted Duration of Therapy Intervention:  every other week x 2 months     Sofi De La Torre, PT                 I CERTIFY THE NEED FOR THESE SERVICES FURNISHED UNDER        THIS PLAN OF TREATMENT AND WHILE UNDER MY CARE     (Physician co-signature of this document indicates review and certification of the therapy plan).                       Certification Date From:  12/20/18   Certification Date To:  02/20/19    Referring Provider:  lilli ambrocio MD    Initial Assessment        See Epic Evaluation Start of Care Date: 12/20/18

## 2018-12-20 NOTE — PROGRESS NOTES
12/20/18 1300   General Information   Type of Visit Initial OP Ortho PT Evaluation   Start of Care Date 12/20/18   Referring Physician lilli ambrocio MD   Patient/Family Goals Statement back pain and rehab   Orders Evaluate and Treat   Date of Order 12/11/18   Insurance Type Medicare   Medical Diagnosis history of lumbar fusion z98.1   Surgical/Medical history reviewed Yes   Precautions/Limitations no known precautions/limitations   Body Part(s)   Body Part(s) Lumbar Spine/SI   Presentation and Etiology   Pertinent history of current problem (include personal factors and/or comorbidities that impact the POC) patient reports that she had lumbar fusion 2014 . about mid nov she was walking outside and slipped on snow and landed on buttocks and hit back against stairs , had cat scan and MRI which was normal , right leg pain at first but this pain has resolved . PMH left TKA 2009 and may need right TKA in a year . currently her back will bother when she is sitting will have a poking feeling in right low back . when she move sit to stand will have sharp pain in low back , when she moves to get out of car will have sharp pain . denies numb or tingling .since the fall will have throbbing if she is overally active in the right LE . when she sits and leans back against car seat will have a tingling feeling across low back .   Impairments A. Pain   Functional Limitations perform activities of daily living;perform desired leisure / sports activities   Symptom Location low back and right leg    How/Where did it occur With a fall   Onset date of current episode/exacerbation 11/15/18   Chronicity New   Pain rating (0-10 point scale) Worst (/10);Best (/10)   Best (/10) 0/10   Worst (/10) 7/10   Pain quality A. Sharp;B. Dull;C. Aching   Frequency of pain/symptoms C. With activity   Pain/symptoms exacerbated by J. ADL;K. Home tasks;B. Walking   Pain/symptoms eased by C. Rest   Progression of symptoms since onset: Improved   Prior Level  of Function   Functional Level Prior Comment walking    Current Level of Function   Current Community Support Family/friend caregiver   Patient role/employment history F. Retired   Fall Risk Screen   Fall screen completed by PT   Have you fallen 2 or more times in the past year? No   Have you fallen and had an injury in the past year? Yes   Timed Up and Go score (seconds) less than 13 seconds    Is patient a fall risk? No   Fall screen comments slipped on snow on step    System Outcome Measures   Outcome Measures Low Back Pain (see Oswestry and Antonio)   Lumbar Spine/SI Objective Findings   Flexion ROM full and no pain    Extension ROM moderate pain at end range    Right Side Bending ROM no pain    Left Side Bending ROM pain end range    Hip Screen neg    Lumbar/Hip/Knee/Foot Strength Comments good strength in B LE 4/5   Hamstring Flexibility B 0   SLR neg B   Palpation right low back pain    Planned Therapy Interventions   Planned Therapy Interventions ADL retraining;ROM;strengthening;stretching   Clinical Impression   Criteria for Skilled Therapeutic Interventions Met yes, treatment indicated   PT Diagnosis s/p lumbar fusion 2014 , strain of low back due to fall    Functional limitations due to impairments ONDINA 17.78   Clinical Presentation Stable/Uncomplicated   Clinical Presentation Rationale patient seen s/p lumbar fusion 2014 and recent strain due to fall , presents with pain and decrease core strength , Rx is exercises in clinic and progression of HEP    Clinical Decision Making (Complexity) Low complexity   Predicted Duration of Therapy Intervention (days/wks) every other week x 2 months    Risk & Benefits of therapy have been explained Yes   Patient, Family & other staff in agreement with plan of care Yes   Education Assessment   Preferred Learning Style Listening;Reading;Demonstration   Barriers to Learning No barriers   ORTHO GOALS   PT Ortho Eval Goals 1;2   Ortho Goal 1   Goal Identifier 1   Goal  Description instruction in HEP and compliant with it 5 of 7 days    Target Date 02/20/19   Ortho Goal 2   Goal Identifier 2   Goal Description patient to return to baseline pain level currently 0-7/10 goal is 0-3/10   Target Date 02/20/19   Total Evaluation Time   PT Eval, Low Complexity Minutes (96752) 15   Therapy Certification   Certification date from 12/20/18   Certification date to 02/20/19   Medical Diagnosis history of lumbar fusion z98.1

## 2019-01-06 DIAGNOSIS — L20.84 INTRINSIC ECZEMA: ICD-10-CM

## 2019-01-07 RX ORDER — FLUOCINOLONE ACETONIDE 0.1 MG/ML
SOLUTION TOPICAL
Qty: 60 ML | Refills: 2 | Status: SHIPPED | OUTPATIENT
Start: 2019-01-07 | End: 2021-04-21

## 2019-01-07 NOTE — TELEPHONE ENCOUNTER
"Requested Prescriptions   Pending Prescriptions Disp Refills     fluocinolone (SYNALAR) 0.01 % solution [Pharmacy Med Name: FLUOCINOLONE ACET SOLN 60ML 0.01%] 60 mL 3    Last Written Prescription Date:  9/6/17  Last Fill Quantity: 60 ml,  # refills: 3   Last office visit: 6/13/2018 with prescribing provider:     Future Office Visit:     Sig: APPLY TOPICALLY TWICE A DAY    Topical Steroids and Nonsteroidals Protocol Passed - 1/6/2019  6:57 PM       Passed - Patient is age 6 or older       Passed - Authorizing prescriber's most recent note related to this medication read.    If refill request is for ophthalmic use, please forward request to provider for approval.         Passed - High potency steroid not ordered       Passed - Recent (12 mo) or future (30 days) visit within the authorizing provider's specialty    Patient had office visit in the last 12 months or has a visit in the next 30 days with authorizing provider or within the authorizing provider's specialty.  See \"Patient Info\" tab in inbasket, or \"Choose Columns\" in Meds & Orders section of the refill encounter.           Passed - Medication is active on med list      Prescription approved per [facility] Refill Protocol.    Marlen Belcher RN      "

## 2019-01-14 ENCOUNTER — HOSPITAL ENCOUNTER (OUTPATIENT)
Dept: PHYSICAL THERAPY | Facility: OTHER | Age: 64
Setting detail: THERAPIES SERIES
End: 2019-01-14
Attending: NEUROLOGICAL SURGERY
Payer: MEDICARE

## 2019-01-14 PROCEDURE — 97110 THERAPEUTIC EXERCISES: CPT | Mod: GP | Performed by: PHYSICAL THERAPIST

## 2019-02-19 NOTE — ADDENDUM NOTE
Encounter addended by: Sofi De La Torre, PT on: 2/19/2019 9:14 AM   Actions taken: Episode resolved, Sign clinical note

## 2019-02-19 NOTE — PROGRESS NOTES
Outpatient Physical Therapy Discharge Note     Patient: Lani Barry  : 1955    Beginning/End Dates of Reporting Period:  2018 to 2019    Referring Provider: lilli ambrocio MD     Therapy Diagnosis: lumbar fusion      Client Self Report: back is feeling overall better and HEP is going well     Objective Measurements:  Objective Measure: ONDINA 18 jasson low          patient seen for 2 Rx sessions for exercises in clinic and progression of HEP at last Rx she was completing the following   bridges , hip adduction isometric, SLR and hip abduction  goal 20 , biodex x 10 , bosu x 5 at home single leg balance , step and reach 10x   She cancelled her last Rx on 2019 and as of 2019 she has not followed up with PT      Goals:  Goal Identifier 1   Goal Description instruction in HEP and compliant with it 5 of 7 days    Target Date 19   Date Met      Progress:     Goal Identifier 2   Goal Description patient to return to baseline pain level currently 0-7/10 goal is 0-3/10   Target Date 19   Date Met      Progress:       Progress Toward Goals:   Progress this reporting period: patient is meeting her therapy goals and is going to continue on HEP           Plan:  Discharge from therapy.    Discharge:    Reason for Discharge: Patient chooses to discontinue therapy.  Patient has failed to schedule further appointments.    Equipment Issued: none    Discharge Plan: Patient to continue home program.

## 2019-03-08 ENCOUNTER — OFFICE VISIT (OUTPATIENT)
Dept: FAMILY MEDICINE | Facility: OTHER | Age: 64
End: 2019-03-08
Payer: MEDICARE

## 2019-03-08 VITALS
OXYGEN SATURATION: 96 % | DIASTOLIC BLOOD PRESSURE: 68 MMHG | WEIGHT: 209 LBS | TEMPERATURE: 96.9 F | RESPIRATION RATE: 18 BRPM | SYSTOLIC BLOOD PRESSURE: 110 MMHG | HEART RATE: 78 BPM | BODY MASS INDEX: 39.46 KG/M2 | HEIGHT: 61 IN

## 2019-03-08 DIAGNOSIS — L30.9 ECZEMA, UNSPECIFIED TYPE: ICD-10-CM

## 2019-03-08 DIAGNOSIS — J01.00 ACUTE NON-RECURRENT MAXILLARY SINUSITIS: Primary | ICD-10-CM

## 2019-03-08 DIAGNOSIS — E78.5 HYPERLIPIDEMIA LDL GOAL <130: ICD-10-CM

## 2019-03-08 PROCEDURE — 99214 OFFICE O/P EST MOD 30 MIN: CPT | Performed by: INTERNAL MEDICINE

## 2019-03-08 RX ORDER — NEOMYCIN SULFATE, POLYMYXIN B SULFATE, AND DEXAMETHASONE 3.5; 10000; 1 MG/G; [USP'U]/G; MG/G
0.5 OINTMENT OPHTHALMIC 4 TIMES DAILY
Qty: 1 TUBE | Refills: 1 | Status: SHIPPED | OUTPATIENT
Start: 2019-03-08 | End: 2019-07-01

## 2019-03-08 RX ORDER — LORATADINE 10 MG/1
10 TABLET ORAL DAILY
Qty: 30 TABLET | Refills: 0 | Status: SHIPPED | OUTPATIENT
Start: 2019-03-08 | End: 2019-07-01

## 2019-03-08 RX ORDER — ROSUVASTATIN CALCIUM 40 MG/1
40 TABLET, COATED ORAL DAILY
Qty: 90 TABLET | Refills: 0 | Status: SHIPPED | OUTPATIENT
Start: 2019-03-08 | End: 2020-11-13

## 2019-03-08 RX ORDER — AZITHROMYCIN 250 MG/1
TABLET, FILM COATED ORAL
Qty: 6 TABLET | Refills: 0 | Status: SHIPPED | OUTPATIENT
Start: 2019-03-08 | End: 2019-07-01

## 2019-03-08 ASSESSMENT — MIFFLIN-ST. JEOR: SCORE: 1431.43

## 2019-03-08 ASSESSMENT — PAIN SCALES - GENERAL: PAINLEVEL: NO PAIN (0)

## 2019-03-08 NOTE — PROGRESS NOTES
SUBJECTIVE:   Lani Barry is a 64 year old female who presents to clinic today for the following health issues:      Acute Illness   Acute illness concerns: sinus and ears  Onset: 4-5 days    Fever: no    Chills/Sweats: no    Headache (location?): no    Sinus Pressure:YES    Conjunctivitis:  no    Ear Pain: YES- plugged     Rhinorrhea: YES    Congestion: no    Sore Throat: no     Cough: no    Wheeze: YES    Decreased Appetite: no    Nausea: no    Vomiting: no    Diarrhea:  no    Dysuria/Freq.: no    Fatigue/Achiness: YES    Sick/Strep Exposure: no       Eye(s) Problem  Onset: 1 month    Description:   Location: bilateral  Pain: no  Redness: YES    Accompanying Signs & Symptoms:  Discharge/mattering: YES- right eye  Swelling: no  Visual changes: no  Fever: no  Nasal Congestion: YES  Bothered by bright lights: no    History:   Trauma: no   Foreign body exposure: no    Precipitating factors:   Wearing contacts: no    GERD/Heartburn    Description:     Burning in chest: YES    Progression of Symptoms: worsening    Accompanying Signs & Symptoms:  Does it feel like food gets stuck: no  Nausea: no  Vomiting (bloody?): no  Abdominal Pain: no  Black-Tarry stools: no:  Bloody stools: no    History:   Previous ulcers: no    Precipitating factors:   Caffeine use: YES  Alcohol use: no  NSAID/Aspirin use: YES  Tobacco use: no quit 2 months ago  Worse with spicy foods.    Therapies Tried and outcome:stopped Nexium                     Chief Complaint           The patient presents today with 3 primary concerns.  She has a rash under her eyes.  There is a small erythematous area in the lower eyelids.  It does not affect conjunctiva.  She does have a history of some eczema and is currently using is not her scalp.  I recommended that she never uses the scalp medicine on her eyelids as this is far too strong.  No signs of infection are noted.  I recommended the use of Maxitrol assuming that she will probably get some of this in  her eyes in the first place, this will be less concerning.  Her second concern is that of an upper respiratory tract infection.  Her ears are popping, her sinuses are plugged.  She has facial discomfort bilaterally.  She is had a low-grade fever but has not measured it.  She is taking food and fluid adequately.  Her third concern is that of gastroesophageal reflux.  She has had this for some time and does get good results with Nexium.  She stops the Nexium because 1 of the class action  commercials on TV suggested that she would die.  I suggested to her that this is statistically unlikely and much less likely than the concerns regarding the possibility of esophageal cancer secondary to Bennett's secondary to reflux etc.  She will go back on her Nexium and observe for response.                         PAST, FAMILY,SOCIAL HISTORY:     Medical  History:   has a past medical history of Arthritis, Cerebral infarction (H), and Morbid obesity (H) (10/25/2017).     Surgical History:   has a past surgical history that includes Release ulnar nerve (elbow) (Left, 8/30/2016); Inject facet joint (N/A, 7/10/2017); and Transposition ulnar nerve (elbow) (Left, 6/19/2018).     Social History:   reports that she quit smoking about 2 months ago. Her smoking use included cigars. She smoked 0.10 packs per day. she has never used smokeless tobacco. She reports that she does not drink alcohol or use drugs.     Family History:  family history includes Cancer in her sister; Cerebrovascular Disease in her sister; Diabetes in her paternal uncle.            MEDICATIONS  Current Outpatient Medications   Medication Sig Dispense Refill     ASPIRIN PO Take 81 mg by mouth       azithromycin (ZITHROMAX) 250 MG tablet Take 2 tablets (500 mg) by mouth daily for 1 day, THEN 1 tablet (250 mg) daily for 4 days. 6 tablet 0     fluocinolone (SYNALAR) 0.01 % solution APPLY TOPICALLY TWICE A DAY 60 mL 2     loratadine (CLARITIN) 10 MG tablet Take 1  "tablet (10 mg) by mouth daily 30 tablet 0     neomycin-polymyxin-dexamethasone (MAXITROL) 3.5-02428-8.1 ophthalmic ointment Place 0.5 inches into both eyes 4 times daily 1 Tube 1     rosuvastatin (CRESTOR) 40 MG tablet Take 1 tablet (40 mg) by mouth daily 90 tablet 0     DIPHENHYDRAMINE HCL PO Take 50 mg by mouth every 6 hours as needed           --------------------------------------------------------------------------------------------------------------------                              Review of Systems       LUNGS: Pt denies: cough, excess sputum, hemoptysis, or shortness of breath.   HEART: Pt denies: chest pain, arrhythmia, syncope, tachy or bradyarrhythmia.   GI: Pt denies: nausea, vomiting, diarrhea, constipation, melena, or hematochezia.  He does note excessive belching and some reflux symptoms.  Has had no other major concerns.  Notes that in the past this has resolved with the use of Nexium.   NEURO: Pt denies: seizures, strokes, diplopia, weakness, paraesthesias, or paralysis.   SKIN: Pt denies: itching,  discoloration, or specific lesions of concern. Denies recent hair loss.  Does have the eczema on the back of her scalp which is controlled with the Synalar.  Has a slight erythema under her eyes bilaterally which only measures approximately half a centimeter by half a centimeter.    PSYCH: The patient denies significant depression, anxiety, mood imbalance. Specifically denies any suicidal ideation.                                     Examination      /68 (BP Location: Left arm, Patient Position: Sitting, Cuff Size: Adult Small)   Pulse 78   Temp 96.9  F (36.1  C) (Temporal)   Resp 18   Ht 1.543 m (5' 0.75\")   Wt 94.8 kg (209 lb)   SpO2 96%   BMI 39.82 kg/m     LUNGS: clear bilaterally, airflow is brisk, no intercostal retraction or stridor is noted. No coughing is noted during visit.   HEART:  regular without rubs, clicks, gallops, or murmurs. PMI is nondisplaced. Upstrokes are brisk. " S1,S2 are heard.   GI: Abdomen is soft, without rebound, guarding or tenderness. Bowel sounds are appropriate. No renal bruits are heard.   NEURO: Pt is alert and appropriate. No neurologic lateralization is noted. Cranial nerves 2-12 are intact. Peripheral sensory and motor function are grossly normal.    SKIN:  warm and dry. No additional lesions of concern are noted.    ENT: Pharynx is non-erythemous, thick and yellow PND, significant congestion is noted., there is significant nasal obstruction, TM's not red but significantly retracted, hearing intact bilaterally. No carotid bruits are heard. No JVD seen. Thyroid is not nodular or enlarged.  Positive maxillary tap is noted.                                           Decision Making    1. Acute non-recurrent maxillary sinusitis  Antihistamine, antibiotic, fluids, rest, Tylenol  - loratadine (CLARITIN) 10 MG tablet; Take 1 tablet (10 mg) by mouth daily  Dispense: 30 tablet; Refill: 0  - azithromycin (ZITHROMAX) 250 MG tablet; Take 2 tablets (500 mg) by mouth daily for 1 day, THEN 1 tablet (250 mg) daily for 4 days.  Dispense: 6 tablet; Refill: 0    2. Eczema, unspecified type  Maxitrol ointment to the area below the eyes twice to 4 times daily  - neomycin-polymyxin-dexamethasone (MAXITROL) 3.5-04731-8.1 ophthalmic ointment; Place 0.5 inches into both eyes 4 times daily  Dispense: 1 Tube; Refill: 1    3. Hyperlipidemia LDL goal <130  Restart Crestor and check lab work at physical examination in the summer  - rosuvastatin (CRESTOR) 40 MG tablet; Take 1 tablet (40 mg) by mouth daily  Dispense: 90 tablet; Refill: 0                           FOLLOW UP   I have asked the patient to make an appointment for followup with me in June for physical examination        I have carefully explained the diagnosis and treatment options to the patient.  The patient has displayed an understanding of the above, and all subsequent questions were answered.      Cipriano Arias DO  FACOI    Portions of this note were produced using [employer]  Although every attempt at real-time proof reading has been made, occasional grammar/syntax errors may have been missed.

## 2019-03-12 ENCOUNTER — OFFICE VISIT (OUTPATIENT)
Dept: ORTHOPEDICS | Facility: CLINIC | Age: 64
End: 2019-03-12
Payer: MEDICARE

## 2019-03-12 VITALS
BODY MASS INDEX: 39.46 KG/M2 | HEART RATE: 67 BPM | WEIGHT: 209 LBS | SYSTOLIC BLOOD PRESSURE: 116 MMHG | DIASTOLIC BLOOD PRESSURE: 63 MMHG | HEIGHT: 61 IN

## 2019-03-12 DIAGNOSIS — S83.241D TEAR OF MEDIAL MENISCUS OF RIGHT KNEE, CURRENT, UNSPECIFIED TEAR TYPE, SUBSEQUENT ENCOUNTER: Primary | ICD-10-CM

## 2019-03-12 PROCEDURE — 20610 DRAIN/INJ JOINT/BURSA W/O US: CPT | Mod: RT | Performed by: ORTHOPAEDIC SURGERY

## 2019-03-12 RX ORDER — BETAMETHASONE SODIUM PHOSPHATE AND BETAMETHASONE ACETATE 3; 3 MG/ML; MG/ML
12 INJECTION, SUSPENSION INTRA-ARTICULAR; INTRALESIONAL; INTRAMUSCULAR; SOFT TISSUE ONCE
Status: COMPLETED | OUTPATIENT
Start: 2019-03-12 | End: 2019-03-12

## 2019-03-12 RX ADMIN — BETAMETHASONE SODIUM PHOSPHATE AND BETAMETHASONE ACETATE 12 MG: 3; 3 INJECTION, SUSPENSION INTRA-ARTICULAR; INTRALESIONAL; INTRAMUSCULAR; SOFT TISSUE at 11:03

## 2019-03-12 ASSESSMENT — PAIN SCALES - GENERAL: PAINLEVEL: SEVERE PAIN (6)

## 2019-03-12 ASSESSMENT — MIFFLIN-ST. JEOR: SCORE: 1431.43

## 2019-03-12 NOTE — LETTER
"    3/12/2019         RE: Lani Barry  [address] [facility] [address]        Dear Colleague,    Thank you for referring your patient, Lani Barry, to the [facility]. Please see a copy of my visit note below.    Office Visit-Follow up  HISTORY OF PRESENT ILLNESS:    Lani Barry is a 64 year old female who is seen in follow up for   Chief Complaint   Patient presents with     RECHECK     Right knee pain.  Onset:  11/12/18.  Symptoms brought on by Jump roping.        Patient presents with:  RECHECK: Right knee pain.  Onset:  11/12/18.  Symptoms brought on by Jump roping.       Patient got about 3 solid months of good pain relief with the previous injection.  She recognizes 40 days of no pain and then a slow recurrence of discomfort.  Present symptoms: Similar symptoms as before medial knee discomfort.  Treatments tried to this point: Is on injection was offered after an MRI was done.    REVIEW OF SYSTEMS    General: negative for, night sweats, dizziness, fatigue  Resp: No shortness of breath and no cough  CV: negative for chest pain, syncope or near-syncope  GI: negative for nausea, vomiting and diarrhea  : negative for dysuria and hematuria  Musculoskeletal: as above  Neurologic: negative for syncope   Hematologic: negative for bleeding disorder    Physical Exam:    Vitals: /63 (BP Location: Right arm, Patient Position: Chair, Cuff Size: Adult Large)   Pulse 67   Ht 1.543 m (5' 0.75\")   Wt 94.8 kg (209 lb)   BMI 39.82 kg/m     BMI= Body mass index is 39.82 kg/m .  Constitutional: healthy, alert and no acute distress   Psychiatric: mentation appears normal and affect normal/bright  NEURO: no focal deficits  SKIN: no excoriation or erythema. No signs of infection.    GAIT: Mildly antalgic on the right    JOINT/EXTREMITIES:     Persistent medial knee pain.  No loss of range of motion.    IMAGING INTERPRETATION: Previous MRIs were reviewed.       Impression:      ICD-10-CM    1. " Tear of medial meniscus of right knee, current, unspecified tear type, subsequent encounter S83.241D Large Joint/Bursa injection and/or drainage (Shoulder, Knee)     betamethasone acet & sod phos (CELESTONE) injection 12 mg       Has been functioning well following the cortisone injection with no deterioration of symptoms.  Of note the patient has a daughter who had a bad reaction to the Synvisc type injection and therefore the patient is concerned that she would not tolerate Synvisc.    Plan:   The above was reviewed with Lani and main request is simply to have the knee reinjected..     I would agree that this is the simplest thing to try and keep her functional without doing something more radical.    Therefore after a ChloraPrep skin preparation we used strict sterile technique injected from the anterior lateral approach 2 mL's of Celestone and 5 mL's of lidocaine.  She tolerated this very well.      Return to clinic PRN    Luís Kang MD    Again, thank you for allowing me to participate in the care of your patient.        Sincerely,        Luís Kang MD

## 2019-03-12 NOTE — PROGRESS NOTES
"Office Visit-Follow up  HISTORY OF PRESENT ILLNESS:    Lani Barry is a 64 year old female who is seen in follow up for   Chief Complaint   Patient presents with     RECHECK     Right knee pain.  Onset:  11/12/18.  Symptoms brought on by Jump roping.        Patient presents with:  RECHECK: Right knee pain.  Onset:  11/12/18.  Symptoms brought on by Jump roping.       Patient got about 3 solid months of good pain relief with the previous injection.  She recognizes 40 days of no pain and then a slow recurrence of discomfort.  Present symptoms: Similar symptoms as before medial knee discomfort.  Treatments tried to this point: Is on injection was offered after an MRI was done.    REVIEW OF SYSTEMS    General: negative for, night sweats, dizziness, fatigue  Resp: No shortness of breath and no cough  CV: negative for chest pain, syncope or near-syncope  GI: negative for nausea, vomiting and diarrhea  : negative for dysuria and hematuria  Musculoskeletal: as above  Neurologic: negative for syncope   Hematologic: negative for bleeding disorder    Physical Exam:    Vitals: /63 (BP Location: Right arm, Patient Position: Chair, Cuff Size: Adult Large)   Pulse 67   Ht 1.543 m (5' 0.75\")   Wt 94.8 kg (209 lb)   BMI 39.82 kg/m    BMI= Body mass index is 39.82 kg/m .  Constitutional: healthy, alert and no acute distress   Psychiatric: mentation appears normal and affect normal/bright  NEURO: no focal deficits  SKIN: no excoriation or erythema. No signs of infection.    GAIT: Mildly antalgic on the right    JOINT/EXTREMITIES:     Persistent medial knee pain.  No loss of range of motion.    IMAGING INTERPRETATION: Previous MRIs were reviewed.       Impression:      ICD-10-CM    1. Tear of medial meniscus of right knee, current, unspecified tear type, subsequent encounter S83.241D Large Joint/Bursa injection and/or drainage (Shoulder, Knee)     betamethasone acet & sod phos (CELESTONE) injection 12 mg       Has been " functioning well following the cortisone injection with no deterioration of symptoms.  Of note the patient has a daughter who had a bad reaction to the Synvisc type injection and therefore the patient is concerned that she would not tolerate Synvisc.    Plan:   The above was reviewed with Lani and main request is simply to have the knee reinjected..     I would agree that this is the simplest thing to try and keep her functional without doing something more radical.    Therefore after a ChloraPrep skin preparation we used strict sterile technique injected from the anterior lateral approach 2 mL's of Celestone and 5 mL's of lidocaine.  She tolerated this very well.      Return to clinic PRN    Prior to injection, verified patient identity using patient's name and date of birth.  Due to injection administration, patient instructed to remain in clinic for 15 minutes  afterwards, and to report any adverse reaction to me immediately.    Joint injection was performed.      Drug Amount Wasted:  None.  Vial/Syringe: Multi dose vial  Expiration Date:  8/2020    Luís Kang MD

## 2019-06-23 ASSESSMENT — MIFFLIN-ST. JEOR: SCORE: 1407.25

## 2019-07-01 ENCOUNTER — OFFICE VISIT (OUTPATIENT)
Dept: FAMILY MEDICINE | Facility: OTHER | Age: 64
End: 2019-07-01
Payer: MEDICARE

## 2019-07-01 VITALS
DIASTOLIC BLOOD PRESSURE: 78 MMHG | TEMPERATURE: 97.9 F | RESPIRATION RATE: 14 BRPM | OXYGEN SATURATION: 97 % | WEIGHT: 205 LBS | HEART RATE: 70 BPM | SYSTOLIC BLOOD PRESSURE: 116 MMHG | BODY MASS INDEX: 38.71 KG/M2 | HEIGHT: 61 IN

## 2019-07-01 DIAGNOSIS — E66.01 MORBID OBESITY (H): ICD-10-CM

## 2019-07-01 DIAGNOSIS — E78.5 HYPERLIPIDEMIA LDL GOAL <130: ICD-10-CM

## 2019-07-01 DIAGNOSIS — Z01.818 PREOP GENERAL PHYSICAL EXAM: Primary | ICD-10-CM

## 2019-07-01 LAB
ALBUMIN UR-MCNC: NEGATIVE MG/DL
ANION GAP SERPL CALCULATED.3IONS-SCNC: 5 MMOL/L (ref 3–14)
APPEARANCE UR: CLEAR
BILIRUB UR QL STRIP: NEGATIVE
BUN SERPL-MCNC: 12 MG/DL (ref 7–30)
CALCIUM SERPL-MCNC: 9.6 MG/DL (ref 8.5–10.1)
CHLORIDE SERPL-SCNC: 107 MMOL/L (ref 94–109)
CO2 SERPL-SCNC: 31 MMOL/L (ref 20–32)
COLOR UR AUTO: YELLOW
CREAT SERPL-MCNC: 0.74 MG/DL (ref 0.52–1.04)
ERYTHROCYTE [DISTWIDTH] IN BLOOD BY AUTOMATED COUNT: 12.5 % (ref 10–15)
GFR SERPL CREATININE-BSD FRML MDRD: 85 ML/MIN/{1.73_M2}
GLUCOSE SERPL-MCNC: 92 MG/DL (ref 70–99)
GLUCOSE UR STRIP-MCNC: NEGATIVE MG/DL
HCT VFR BLD AUTO: 44.2 % (ref 35–47)
HGB BLD-MCNC: 14.2 G/DL (ref 11.7–15.7)
HGB UR QL STRIP: NEGATIVE
KETONES UR STRIP-MCNC: NEGATIVE MG/DL
LEUKOCYTE ESTERASE UR QL STRIP: NEGATIVE
MCH RBC QN AUTO: 30.6 PG (ref 26.5–33)
MCHC RBC AUTO-ENTMCNC: 32.1 G/DL (ref 31.5–36.5)
MCV RBC AUTO: 95 FL (ref 78–100)
NITRATE UR QL: NEGATIVE
PH UR STRIP: 5.5 PH (ref 5–7)
PLATELET # BLD AUTO: 279 10E9/L (ref 150–450)
POTASSIUM SERPL-SCNC: 4.6 MMOL/L (ref 3.4–5.3)
RBC # BLD AUTO: 4.64 10E12/L (ref 3.8–5.2)
SODIUM SERPL-SCNC: 143 MMOL/L (ref 133–144)
SOURCE: NORMAL
SP GR UR STRIP: 1.02 (ref 1–1.03)
UROBILINOGEN UR STRIP-ACNC: 0.2 EU/DL (ref 0.2–1)
WBC # BLD AUTO: 6.4 10E9/L (ref 4–11)

## 2019-07-01 PROCEDURE — 81003 URINALYSIS AUTO W/O SCOPE: CPT | Performed by: INTERNAL MEDICINE

## 2019-07-01 PROCEDURE — 93000 ELECTROCARDIOGRAM COMPLETE: CPT | Performed by: INTERNAL MEDICINE

## 2019-07-01 PROCEDURE — 99214 OFFICE O/P EST MOD 30 MIN: CPT | Performed by: INTERNAL MEDICINE

## 2019-07-01 PROCEDURE — 85027 COMPLETE CBC AUTOMATED: CPT | Performed by: INTERNAL MEDICINE

## 2019-07-01 PROCEDURE — 36415 COLL VENOUS BLD VENIPUNCTURE: CPT | Performed by: INTERNAL MEDICINE

## 2019-07-01 PROCEDURE — 80048 BASIC METABOLIC PNL TOTAL CA: CPT | Performed by: INTERNAL MEDICINE

## 2019-07-01 RX ORDER — LORATADINE 10 MG/1
10 TABLET ORAL DAILY
Qty: 30 TABLET | Refills: 0 | Status: SHIPPED | OUTPATIENT
Start: 2019-07-01 | End: 2021-04-21

## 2019-07-01 ASSESSMENT — MIFFLIN-ST. JEOR: SCORE: 1415.66

## 2019-07-01 ASSESSMENT — PAIN SCALES - GENERAL: PAINLEVEL: MILD PAIN (3)

## 2019-07-01 NOTE — PROGRESS NOTES
[facility]  [address] [facility] [address]  [phone]  Dept: [phone]    PRE-OP EVALUATION:  Today's date: 2019    Lani Barry (: 1955) presents for pre-operative evaluation assessment.      Primary Physician: Cipriano Arias  Type of Anesthesia Anticipated: General    Patient has a Health Care Directive or Living Will:  YES     Preop Questions 2019   Who is doing your surgery? Dr Candelario   What are you having done? total knee replacement   Date of Surgery/Procedure: July 10   Facility or Hospital where procedure/surgery will be performed: [facility]   1.  Do you have a history of Heart attack, stroke, stent, coronary bypass surgery, or other heart surgery? No   2.  Do you ever have any pain or discomfort in your chest? No   3.  Do you have a history of  Heart Failure? No   4.   Are you troubled by shortness of breath when:  walking on a level surface, or up a slight hill, or at night? No   5.  Do you currently have a cold, bronchitis or other respiratory infection? No   6.  Do you have a cough, shortness of breath, or wheezing? No   7.  Do you sometimes get pains in the calves of your legs when you walk? YES    8. Do you or anyone in your family have previous history of blood clots? No   9.  Do you or does anyone in your family have a serious bleeding problem such as prolonged bleeding following surgeries or cuts? No   10. Have you ever had problems with anemia or been told to take iron pills? No   11. Have you had any abnormal blood loss such as black, tarry or bloody stools, or abnormal vaginal bleeding? No   12. Have you ever had a blood transfusion? No   13. Have you or any of your relatives ever had problems with anesthesia? YES, sister   14. Do you have sleep apnea, excessive snoring or daytime drowsiness? No   15. Do you have any prosthetic heart valves? No   16. Do you have prosthetic joints? YES - left knee   17. Is there any chance that  you may be pregnant? No         HPI:     HPI related to upcoming procedure: The patient is a pleasant 64-year-old female who is anticipating undergoing total knee arthroplasty on the right.  She has a history of previous arthroplasty on the left about 10 years ago and has been doing well since this time.  She is recently developed increased discomfort and radiographic examination demonstrates that surgery is appropriate.  She is discussed this with her operating surgeon and is aware of the risks and benefits and would like to pursue surgery.      See problem list for active medical problems.  Problems all longstanding and stable, except as noted/documented.  See ROS for pertinent symptoms related to these conditions.      MEDICAL HISTORY:     Patient Active Problem List    Diagnosis Date Noted     S/P cubital tunnel release 05/18/2018     Priority: Medium     8/30/2016 left side by Dr. Parks       Numbness and tingling in left hand 05/18/2018     Priority: Medium     Advanced directives, counseling/discussion 10/25/2017     Priority: Medium     Will bring in a copy       Morbid obesity (H) 10/25/2017     Priority: Medium     Cubital tunnel syndrome, left 08/18/2016     Priority: Medium     Lesion of left ulnar nerve 08/16/2016     Priority: Medium     Gastroesophageal reflux disease without esophagitis 08/16/2016     Priority: Medium     Hyperlipidemia LDL goal <130 08/16/2016     Priority: Medium      Past Medical History:   Diagnosis Date     Arthritis      Cerebral infarction (H)      Morbid obesity (H) 10/25/2017     Past Surgical History:   Procedure Laterality Date     INJECT FACET JOINT N/A 7/10/2017    Procedure: INJECT FACET JOINT;  Lumbar 3-4 Bilateral Facet joint injection;  Surgeon: Misha Rae MD;  Location: [facility] OR     RELEASE ULNAR NERVE (ELBOW) Left 8/30/2016    Procedure: RELEASE ULNAR NERVE (ELBOW);  Surgeon: Steve Parks DO;  Location: [facility] OR     TRANSPOSITION ULNAR NERVE  (ELBOW) Left 2018    Procedure: TRANSPOSITION ULNAR NERVE (ELBOW);  Left ulnar nerve decompression with transposition;  Surgeon: Steve Parks DO;  Location: [facility] OR     Current Outpatient Medications   Medication Sig Dispense Refill     ASPIRIN PO Take 81 mg by mouth       esomeprazole (NEXIUM) 20 MG DR capsule Take 20 mg by mouth every morning (before breakfast) Take 30-60 minutes before eating.       fluocinolone (SYNALAR) 0.01 % solution APPLY TOPICALLY TWICE A DAY 60 mL 2     loratadine (CLARITIN) 10 MG tablet Take 1 tablet (10 mg) by mouth daily 30 tablet 0     rosuvastatin (CRESTOR) 40 MG tablet Take 1 tablet (40 mg) by mouth daily 90 tablet 0     DIPHENHYDRAMINE HCL PO Take 50 mg by mouth every 6 hours as needed       OTC products: None, except as noted above    Allergies   Allergen Reactions     Bees      Seasonal Allergies       Latex Allergy: NO    Social History     Tobacco Use     Smoking status: Former Smoker     Packs/day: 0.10     Types: Cigars     Last attempt to quit: 2019     Years since quittin.4     Smokeless tobacco: Never Used   Substance Use Topics     Alcohol use: No     Alcohol/week: 0.0 oz     History   Drug Use No       REVIEW OF SYSTEMS:   CONSTITUTIONAL: NEGATIVE for fever, chills, change in weight  INTEGUMENTARY/SKIN: NEGATIVE for worrisome rashes, moles or lesions  EYES: NEGATIVE for vision changes or irritation  ENT/MOUTH: NEGATIVE for ear, mouth and throat problems  RESP: NEGATIVE for significant cough or SOB  BREAST: NEGATIVE for masses, tenderness or discharge  CV: NEGATIVE for chest pain, palpitations or peripheral edema  GI: NEGATIVE for nausea, abdominal pain, heartburn, or change in bowel habits  : NEGATIVE for frequency, dysuria, or hematuria  MUSCULOSKELETAL: Positive for right knee pain, crepitance, and the sensation that is going to catch and she will fall.  NEURO: NEGATIVE for weakness, dizziness or paresthesias  ENDOCRINE: NEGATIVE for  "temperature intolerance, skin/hair changes  HEME: NEGATIVE for bleeding problems  PSYCHIATRIC: NEGATIVE for changes in mood or affect    EXAM:   /78 (BP Location: Right arm, Patient Position: Sitting, Cuff Size: Adult Large)   Pulse 70   Temp 97.9  F (36.6  C) (Temporal)   Resp 14   Ht 1.547 m (5' 0.9\")   Wt 93 kg (205 lb)   SpO2 97%   BMI 38.86 kg/m      GENERAL APPEARANCE: healthy, alert and no distress     EYES: EOMI, PERRL     HENT: ear canals and TM's normal and nose and mouth without ulcers or lesions     NECK: no adenopathy, no asymmetry, masses, or scars and thyroid normal to palpation     RESP: lungs clear to auscultation - no rales, rhonchi or wheezes     CV: regular rates and rhythm, normal S1 S2, no S3 or S4 and no murmur, click or rub     ABDOMEN:  soft, nontender, no HSM or masses and bowel sounds normal     MS: Crepitance in the right knee is noted.  No signs of overlying infection or inflammation are noted.     SKIN: no suspicious lesions or rashes     NEURO: Normal strength and tone, sensory exam grossly normal, mentation intact and speech normal     PSYCH: mentation appears normal. 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    REVISED CARDIAC RISK INDEX  The patient has the following serious cardiovascular risks " for perioperative complications such as (MI, PE, VFib and 3  AV Block):  No serious cardiac risks  INTERPRETATION: 1 risks: Class II (low risk - 0.9% complication rate)    The patient has the following additional risks for perioperative complications:  No identified additional risks      ICD-10-CM    1. Preop general physical exam Z01.818    2. Acute non-recurrent maxillary sinusitis J01.00        RECOMMENDATIONS:         --Patient is to take all scheduled medications on the day of surgery.        APPROVAL GIVEN to proceed with proposed procedure, without further diagnostic evaluation       Signed Electronically by: Cipriano Arias DO    Copy of this evaluation report is provided to requesting physician.    [place] Preop Guidelines    Revised Cardiac Risk Index

## 2019-07-01 NOTE — LETTER
July 2, 2019      Lani Barry  [id number] [facility] [address]        Dear ,    We are writing to inform you of your test results.    The urine sample is normal.   Including blood sugar and kidney function.   Cell count is normal without evidence of anemia or leukemia.     Feel free to contact me via the office or My Chart if you have any questions regarding the above.     Resulted Orders   CBC with platelets   Result Value Ref Range    WBC 6.4 4.0 - 11.0 10e9/L    RBC Count 4.64 3.8 - 5.2 10e12/L    Hemoglobin 14.2 11.7 - 15.7 g/dL    Hematocrit 44.2 35.0 - 47.0 %    MCV 95 78 - 100 fl    MCH 30.6 26.5 - 33.0 pg    MCHC 32.1 31.5 - 36.5 g/dL    RDW 12.5 10.0 - 15.0 %    Platelet Count 279 150 - 450 10e9/L   Basic metabolic panel   Result Value Ref Range    Sodium 143 133 - 144 mmol/L    Potassium 4.6 3.4 - 5.3 mmol/L    Chloride 107 94 - 109 mmol/L    Carbon Dioxide 31 20 - 32 mmol/L    Anion Gap 5 3 - 14 mmol/L    Glucose 92 70 - 99 mg/dL    Urea Nitrogen 12 7 - 30 mg/dL    Creatinine 0.74 0.52 - 1.04 mg/dL    GFR Estimate 85 >60 mL/min/[1.73_m2]      Comment:      Non  GFR Calc  Starting 12/18/2018, serum creatinine based estimated GFR (eGFR) will be   calculated using the Chronic Kidney Disease Epidemiology Collaboration   (CKD-EPI) equation.      GFR Estimate If Black >90 >60 mL/min/[1.73_m2]      Comment:       GFR Calc  Starting 12/18/2018, serum creatinine based estimated GFR (eGFR) will be   calculated using the Chronic Kidney Disease Epidemiology Collaboration   (CKD-EPI) equation.      Calcium 9.6 8.5 - 10.1 mg/dL   *UA reflex to Microscopic and Culture ([facility] (except [place] and [place])   Result Value Ref Range    Color Urine Yellow     Appearance Urine Clear     Glucose Urine Negative NEG^Negative mg/dL    Bilirubin Urine Negative NEG^Negative    Ketones Urine Negative NEG^Negative mg/dL    Specific Gravity Urine 1.020 1.003 - 1.035     Blood Urine Negative NEG^Negative    pH Urine 5.5 5.0 - 7.0 pH    Protein Albumin Urine Negative NEG^Negative mg/dL    Urobilinogen Urine 0.2 0.2 - 1.0 EU/dL    Nitrite Urine Negative NEG^Negative    Leukocyte Esterase Urine Negative NEG^Negative    Source Unspecified Urine        If you have any questions or concerns, please call the clinic at the number listed above.       Sincerely,        Cipriano Arias, DO

## 2019-07-02 NOTE — RESULT ENCOUNTER NOTE
Dear Lani, your recent test results are attached.  The urine sample is normal.  Including blood sugar and kidney function.  Cell count is normal without evidence of anemia or leukemia.    Feel free to contact me via the office or My Chart if you have any questions regarding the above.

## 2019-07-05 NOTE — ADDENDUM NOTE
Encounter addended by: Tasha Pham, OT on: 7/5/2019 12:00 PM   Actions taken: Flowsheet data copied forward, Flowsheet accepted

## 2019-07-08 NOTE — ADDENDUM NOTE
Encounter addended by: Tasha Pham, OT on: 7/8/2019 3:01 PM   Actions taken: Flowsheet data copied forward, Flowsheet accepted

## 2019-07-10 ENCOUNTER — SURGERY - HEALTHEAST (OUTPATIENT)
Dept: SURGERY | Facility: CLINIC | Age: 64
End: 2019-07-10

## 2019-07-10 ENCOUNTER — ANESTHESIA - HEALTHEAST (OUTPATIENT)
Dept: SURGERY | Facility: CLINIC | Age: 64
End: 2019-07-10

## 2019-07-10 ASSESSMENT — MIFFLIN-ST. JEOR: SCORE: 1400.45

## 2019-07-12 ENCOUNTER — MEDICAL CORRESPONDENCE (OUTPATIENT)
Dept: HEALTH INFORMATION MANAGEMENT | Facility: CLINIC | Age: 64
End: 2019-07-12

## 2019-07-26 ENCOUNTER — OFFICE VISIT (OUTPATIENT)
Dept: FAMILY MEDICINE | Facility: OTHER | Age: 64
End: 2019-07-26
Payer: MEDICARE

## 2019-07-26 VITALS
OXYGEN SATURATION: 96 % | HEART RATE: 80 BPM | DIASTOLIC BLOOD PRESSURE: 70 MMHG | BODY MASS INDEX: 39.24 KG/M2 | RESPIRATION RATE: 16 BRPM | SYSTOLIC BLOOD PRESSURE: 118 MMHG | TEMPERATURE: 99 F | WEIGHT: 207 LBS

## 2019-07-26 DIAGNOSIS — Z98.890 H/O ARTHROSCOPIC KNEE SURGERY: Primary | ICD-10-CM

## 2019-07-26 DIAGNOSIS — E78.5 HYPERLIPIDEMIA LDL GOAL <130: ICD-10-CM

## 2019-07-26 PROCEDURE — 99214 OFFICE O/P EST MOD 30 MIN: CPT | Performed by: INTERNAL MEDICINE

## 2019-07-26 RX ORDER — HYDROCODONE BITARTRATE AND ACETAMINOPHEN 5; 325 MG/1; MG/1
1-2 TABLET ORAL 2 TIMES DAILY
Qty: 20 TABLET | Refills: 0 | Status: SHIPPED | OUTPATIENT
Start: 2019-07-26 | End: 2021-04-21

## 2019-07-26 RX ORDER — HYDROCODONE BITARTRATE AND ACETAMINOPHEN 5; 325 MG/1; MG/1
1-2 TABLET ORAL
COMMUNITY
Start: 2019-07-12 | End: 2019-07-26

## 2019-07-26 ASSESSMENT — PAIN SCALES - GENERAL: PAINLEVEL: MODERATE PAIN (4)

## 2019-07-26 NOTE — PROGRESS NOTES
Subjective     Lani Barry is a 64 year old female who presents to clinic today for the following health issues:    HPI   Chief Complaint   Patient presents with     Surgical Followup     post op right knee replacement DOS 7/10/2019, [facility]                      Chief Complaint         The patient is a pleasant 64-year-old female who recently had a total knee arthroplasty at [facility].  She is here today for postsurgical follow-up.  She has already seen her surgeon for follow-up.  She is noted to be doing quite well.  She has minimal pain, her range of motion is increasing and she has good healing.  She does have a concurrent history of hyperlipidemia and is currently back on her Crestor 40 mg daily.  She is not having any significant muscle discomfort.  She is watching her diet closely and trying to limit the fatty intake.                       PAST, FAMILY,SOCIAL HISTORY:     Medical  History:   has a past medical history of Arthritis, Cerebral infarction (H), and Morbid obesity (H) (10/25/2017).     Surgical History:   has a past surgical history that includes Release ulnar nerve (elbow) (Left, 8/30/2016); Inject facet joint (N/A, 7/10/2017); and Transposition ulnar nerve (elbow) (Left, 6/19/2018).     Social History:   reports that she quit smoking about 6 months ago. Her smoking use included cigars. She smoked 0.10 packs per day. She has never used smokeless tobacco. She reports that she does not drink alcohol or use drugs.     Family History:  family history includes Cancer in her sister; Cerebrovascular Disease in her sister; Diabetes in her paternal uncle.            MEDICATIONS  Current Outpatient Medications   Medication Sig Dispense Refill     ASPIRIN PO Take 325 mg by mouth 2 times daily        esomeprazole (NEXIUM) 20 MG DR capsule Take 20 mg by mouth every morning (before breakfast) Take 30-60 minutes before eating.       fluocinolone (SYNALAR) 0.01 % solution APPLY TOPICALLY  TWICE A DAY 60 mL 2     HYDROcodone-acetaminophen (NORCO) 5-325 MG tablet Take 1-2 tablets by mouth 2 times daily 20 tablet 0     loratadine (CLARITIN) 10 MG tablet Take 1 tablet (10 mg) by mouth daily 30 tablet 0     rosuvastatin (CRESTOR) 40 MG tablet Take 1 tablet (40 mg) by mouth daily 90 tablet 0     DIPHENHYDRAMINE HCL PO Take 50 mg by mouth every 6 hours as needed           --------------------------------------------------------------------------------------------------------------------                              Review of Systems       LUNGS: Pt denies: cough, excess sputum, hemoptysis, or shortness of breath.   HEART: Pt denies: chest pain, arrhythmia, syncope, tachy or bradyarrhythmia.   GI: Pt denies: nausea, vomiting, diarrhea, constipation, melena, or hematochezia.   NEURO: Pt denies: seizures, strokes, diplopia, weakness, paraesthesias, or paralysis.   SKIN: Pt denies: itching, rashes, discoloration, or specific lesions of concern. Denies recent hair loss.   PSYCH: The patient denies significant depression, anxiety, mood imbalance. Specifically denies any suicidal ideation.                                     Examination    /70 (BP Location: Left arm, Patient Position: Sitting, Cuff Size: Adult Large)   Pulse 80   Temp 99  F (37.2  C) (Temporal)   Resp 16   Wt 93.9 kg (207 lb)   SpO2 96%   BMI 39.24 kg/m       Constitutional: The patient appears to be in no acute distress. The patient appears to be adequately hydrated. No acute respiratory or hemodynamic distress is noted at this time.   LUNGS: clear bilaterally, airflow is brisk, no intercostal retraction or stridor is noted. No coughing is noted during visit.   HEART:  regular without rubs, clicks, gallops, or murmurs. PMI is nondisplaced. Upstrokes are brisk. S1,S2 are heard.   GI: Abdomen is soft, without rebound, guarding or tenderness. Bowel sounds are appropriate. No renal bruits are heard.   NEURO: Pt is alert and appropriate.  "No neurologic lateralization is noted. Cranial nerves 2-12 are intact. Peripheral sensory and motor function are grossly normal.    SKIN:  warm and dry. No erythema, or rashes are noted. No specific lesions of concern are noted.    PSYCH: The patient appears grossly appropriate. Maintains good eye contact, does not have any jittery or atypical motion. Displays appropriate affect.   MS: Minimal crepitance is noted in the extremities.  Good healing in the right knee as noted.  Scar is present and there is no signs of infection.  Is a smooth metallic glide.  No deformity is present. Muscle strength is appropriate and equal bilaterally. No acute joint erythema or swelling is present.                                           Decision Making    1. H/O arthroscopic knee surgery  Would recommend continuing the pain medication on a very sparing basis.  This will be the only prescription for this that she receives.  She notes that she is rarely using it.  - HYDROcodone-acetaminophen (NORCO) 5-325 MG tablet; Take 1-2 tablets by mouth 2 times daily  Dispense: 20 tablet; Refill: 0    2. Hyperlipidemia LDL goal <130  Continue statin therapy and follow-up with cholesterol levels in the near future.                             FOLLOW UP   I have asked the patient to make an appointment for followup with me in 3 months for \"Annual Medicare Wellness Visit\"        I have carefully explained the diagnosis and treatment options to the patient.  The patient has displayed an understanding of the above, and all subsequent questions were answered.      DO ADOLFO Barth    Portions of this note were produced using [employer]  Although every attempt at real-time proof reading has been made, occasional grammar/syntax errors may have been missed.             "

## 2019-07-30 ENCOUNTER — HOSPITAL ENCOUNTER (OUTPATIENT)
Dept: PHYSICAL THERAPY | Facility: OTHER | Age: 64
Setting detail: THERAPIES SERIES
End: 2019-07-30
Attending: ORTHOPAEDIC SURGERY
Payer: MEDICARE

## 2019-07-30 PROCEDURE — 97110 THERAPEUTIC EXERCISES: CPT | Mod: GP | Performed by: PHYSICAL THERAPIST

## 2019-07-30 PROCEDURE — 97161 PT EVAL LOW COMPLEX 20 MIN: CPT | Mod: GP | Performed by: PHYSICAL THERAPIST

## 2019-07-30 NOTE — PROGRESS NOTES
07/30/19 1434   General Information   Type of Visit Initial OP Ortho PT Evaluation   Start of Care Date 07/30/19   Referring Physician lauren SUÁREZ, Dr Candelario   Patient/Family Goals Statement knee rehab   Orders Evaluate and Treat   Date of Order 07/12/19   Certification Required? Yes   Medical Diagnosis chronic pain of right knee M25.561 right TKA 7/10/2019   Surgical/Medical history reviewed Yes   Precautions/Limitations no known precautions/limitations   Weight-Bearing Status - RLE full weight-bearing   Body Part(s)   Body Part(s) Knee   Presentation and Etiology   Pertinent history of current problem (include personal factors and/or comorbidities that impact the POC) patient seen s/p right TKA 7/10/219 and overall is doing pretty good , PMH lumbar fusion 2014 and left 2009 TKA    Impairments A. Pain;D. Decreased ROM;F. Decreased strength and endurance;H. Impaired gait   Functional Limitations perform activities of daily living;perform desired leisure / sports activities   Symptom Location right knee    Onset date of current episode/exacerbation 07/10/19   Chronicity New   Pain rating (0-10 point scale) Best (/10);Worst (/10)   Best (/10) 2/10   Worst (/10) 6/10   Pain quality A. Sharp;B. Dull;C. Aching   Frequency of pain/symptoms C. With activity   Pain/symptoms exacerbated by J. ADL;G. Certain positions;B. Walking   Pain/symptoms eased by C. Rest;H. Cold   Progression of symptoms since onset: Improved   Prior Level of Function   Functional Level Prior Comment none   Current Level of Function   Current Community Support Family/friend caregiver   Patient role/employment history F. Retired   Fall Risk Screen   Fall screen completed by PT   Have you fallen 2 or more times in the past year? No   Have you fallen and had an injury in the past year? No   Is patient a fall risk? No   Fall screen comments slipped on snow on step    Knee Objective Findings   Integumentary  mod swelling and slight drainage    Knee ROM  Comment 0 to    Knee/Hip Strength Comments has full AROM    Planned Therapy Interventions   Planned Therapy Interventions ADL retraining;ROM;strengthening;stretching;balance training;gait training   Planned Modality Interventions   Planned Modality Interventions Comments modalities as needed    Clinical Impression   Criteria for Skilled Therapeutic Interventions Met yes, treatment indicated   PT Diagnosis right TKA 7/10/19   Functional limitations due to impairments LEFS 35/80   Clinical Presentation Stable/Uncomplicated   Clinical Presentation Rationale patient seen s/p right TKA 7/10/2019 present with limited ROM , strength , gait and pain , Rx is exercises in clinic and HEP    Clinical Decision Making (Complexity) Low complexity   Predicted Duration of Therapy Intervention (days/wks) 2x week x 1 month , 1x week x 1-2 months    Risk & Benefits of therapy have been explained Yes   Patient, Family & other staff in agreement with plan of care Yes   Education Assessment   Preferred Learning Style Listening;Reading;Demonstration   Barriers to Learning No barriers   ORTHO GOALS   PT Ortho Eval Goals 1;2;3   Ortho Goal 1   Goal Identifier 1   Goal Description instruction in HEP and compliant with it 5 of 7 days    Target Date 10/30/19   Ortho Goal 2   Goal Identifier 2   Goal Description patient to have reduction in pain level currently 2-6/10 goal is 0-3/10   Target Date 10/30/19   Ortho Goal 3   Goal Identifier 3   Goal Description patient to have improved tolerance to activity currently LEFS 35 / 80 goal is 50/80   Target Date 10/30/19   Total Evaluation Time   PT Eval, Low Complexity Minutes (75488) 15   Therapy Certification   Certification date from 07/30/19   Certification date to 10/30/19   Medical Diagnosis chronic pain of right knee M25.561 right TKA 7/10/2019

## 2019-07-30 NOTE — PROGRESS NOTES
[facility]          OUTPATIENT PHYSICAL THERAPY ORTHOPEDIC EVALUATION  PLAN OF TREATMENT FOR OUTPATIENT REHABILITATION  (COMPLETE FOR INITIAL CLAIMS ONLY)  Patient's Last Name, First Name, M.I.  YOB: 1955  Lani Barry    Provider s Name:  [facility]   Medical Record No.  [id number]   Start of Care Date:  07/30/19   Onset Date:  07/10/19   Type:     _X__PT   ___OT   ___SLP Medical Diagnosis:  chronic pain of right knee M25.561 right TKA 7/10/2019     PT Diagnosis:  right TKA 7/10/19   Visits from SOC:  1      _________________________________________________________________________________  Plan of Treatment/Functional Goals:  ADL retraining, ROM, strengthening, stretching, balance training, gait training        modalities as needed   Goals  Goal Identifier: 1  Goal Description: instruction in HEP and compliant with it 5 of 7 days   Target Date: 10/30/19    Goal Identifier: 2  Goal Description: patient to have reduction in pain level currently 2-6/10 goal is 0-3/10  Target Date: 10/30/19    Goal Identifier: 3  Goal Description: patient to have improved tolerance to activity currently LEFS 35 / 80 goal is 50/80  Target Date: 10/30/19          Therapy Frequency:     Predicted Duration of Therapy Intervention:  2x week x 1 month , 1x week x 1-2 months     Sofi De La Torre, PT                 I CERTIFY THE NEED FOR THESE SERVICES FURNISHED UNDER        THIS PLAN OF TREATMENT AND WHILE UNDER MY CARE .             Physician Signature               Date    X_____________________________________________________                             Certification Date From:  07/30/19   Certification Date To:  10/30/19    Referring Provider:  Dr Andree Rivera    Initial Assessment        See Epic Evaluation Start of Care Date: 07/30/19

## 2019-08-01 ENCOUNTER — HOSPITAL ENCOUNTER (OUTPATIENT)
Dept: PHYSICAL THERAPY | Facility: OTHER | Age: 64
Setting detail: THERAPIES SERIES
End: 2019-08-01
Attending: ORTHOPAEDIC SURGERY
Payer: MEDICARE

## 2019-08-01 PROCEDURE — 97110 THERAPEUTIC EXERCISES: CPT | Mod: GP | Performed by: PHYSICAL THERAPIST

## 2019-08-06 ENCOUNTER — HOSPITAL ENCOUNTER (OUTPATIENT)
Dept: PHYSICAL THERAPY | Facility: OTHER | Age: 64
Setting detail: THERAPIES SERIES
End: 2019-08-06
Attending: ORTHOPAEDIC SURGERY
Payer: MEDICARE

## 2019-08-06 PROCEDURE — 97110 THERAPEUTIC EXERCISES: CPT | Mod: GP | Performed by: PHYSICAL THERAPIST

## 2019-08-08 ENCOUNTER — HOSPITAL ENCOUNTER (OUTPATIENT)
Dept: PHYSICAL THERAPY | Facility: OTHER | Age: 64
Setting detail: THERAPIES SERIES
End: 2019-08-08
Attending: ORTHOPAEDIC SURGERY
Payer: MEDICARE

## 2019-08-08 PROCEDURE — 97110 THERAPEUTIC EXERCISES: CPT | Mod: GP | Performed by: PHYSICAL THERAPIST

## 2019-08-12 ENCOUNTER — HOSPITAL ENCOUNTER (OUTPATIENT)
Dept: PHYSICAL THERAPY | Facility: OTHER | Age: 64
Setting detail: THERAPIES SERIES
End: 2019-08-12
Attending: ORTHOPAEDIC SURGERY
Payer: MEDICARE

## 2019-08-12 PROCEDURE — 97110 THERAPEUTIC EXERCISES: CPT | Mod: GP | Performed by: PHYSICAL THERAPIST

## 2019-08-15 ENCOUNTER — HOSPITAL ENCOUNTER (OUTPATIENT)
Dept: PHYSICAL THERAPY | Facility: OTHER | Age: 64
Setting detail: THERAPIES SERIES
End: 2019-08-15
Attending: ORTHOPAEDIC SURGERY
Payer: MEDICARE

## 2019-08-15 PROCEDURE — 97110 THERAPEUTIC EXERCISES: CPT | Mod: GP | Performed by: PHYSICAL THERAPIST

## 2019-08-27 ENCOUNTER — HOSPITAL ENCOUNTER (OUTPATIENT)
Dept: PHYSICAL THERAPY | Facility: OTHER | Age: 64
Setting detail: THERAPIES SERIES
End: 2019-08-27
Attending: ORTHOPAEDIC SURGERY
Payer: MEDICARE

## 2019-08-27 PROCEDURE — 97110 THERAPEUTIC EXERCISES: CPT | Mod: GP | Performed by: PHYSICAL THERAPIST

## 2019-09-09 NOTE — PROGRESS NOTES
Outpatient Physical Therapy Discharge Note     Patient: Lani Barry  : 1955    Beginning/End Dates of Reporting Period:  2019 to 2019    Referring Provider: markell leong MD     Therapy Diagnosis: right TKA      Client Self Report: just makayla stiff     Objective Measurements:  Objective Measure: pain 2-6/10 LEFS 35/80 AROM 0 to 108 at eval   Details: currently pain 0-3/0  LEFS 65 AROM  0-120       patient seen for 7 Rx sessions in clinic for exercises and HEP at last Rx she was comleting the following   SLR, , QS, hip adduction isometric, hip abduction , prone knee flexion 10x goal 30 biodex x 15 level 5   , heelraise 30  and mini squatts B 30, bosu 5 minutes at bar, standing TKE with blue theraband      Goals:  Goal Identifier 1   Goal Description instruction in HEP and compliant with it 5 of 7 days    Target Date 10/30/19   Date Met      Progress:     Goal Identifier 2   Goal Description patient to have reduction in pain level currently 2-6/10 goal is 0-3/10   Target Date 10/30/19   Date Met      Progress:     Goal Identifier 3   Goal Description patient to have improved tolerance to activity currently LEFS 35 / 80 goal is 50/80   Target Date 10/30/19   Date Met      Progress:       Progress Toward Goals:   Progress this reporting period: patient is very compliant with HEP and is meeting all her therapy goals           Plan:  Discharge from therapy.    Discharge:    Reason for Discharge: Patient has met all goals.    Equipment Issued: theraband    Discharge Plan: Patient to continue home program.

## 2019-10-01 ENCOUNTER — TELEPHONE (OUTPATIENT)
Dept: FAMILY MEDICINE | Facility: OTHER | Age: 64
End: 2019-10-01

## 2019-10-01 NOTE — TELEPHONE ENCOUNTER
Lani Barry is a 64 year old female     PRESENTING PROBLEM:  Pt states that she has dealt with acid reflux for many years and taking 40 mg Nexium daily has kept everything at bay.     She even trialed going off the medication for a while, but 8 months ago symptoms returned and she restarted her medication.     Pt calls today with worsening acid reflux, stating that she is waking up at night burping up stomach acid and now her throat is sore. Pt reports feeling fine during the day, symptoms seem to worsen at night. Pt has tried many other medications, prescriptions and OTC and Nexium is the only med that has been working. She is wondering if this is something she can take more often than once daily or if there is any other medication to add.     DENIES shortness of breath, pain in shoulders, neck or chest. Lightheadedness, vomiting or severe pain.     Discussed not lying down soon after eating, eating smaller meals, elevating head while laying down and avoiding spicy foods, fatty foods or citrus fruits.     Offered clinic appt, but she is requesting a message to provider first. Ok to leave a detailed message on voicemail if pt doesn't answer.   Will route to provider to review.     Evelyn Mckeon, RN, BSN

## 2019-10-01 NOTE — TELEPHONE ENCOUNTER
The patient can take the Nexium in the morning and Zantac (over-the-counter 75 mg) twice daily.  If this does not bring about adequate improvement of her symptoms, she should set up an appointment.    Juana

## 2019-10-02 ENCOUNTER — HOSPITAL ENCOUNTER (OUTPATIENT)
Dept: MAMMOGRAPHY | Facility: CLINIC | Age: 64
Discharge: HOME | End: 2019-10-02
Attending: INTERNAL MEDICINE | Admitting: INTERNAL MEDICINE
Payer: MEDICARE

## 2019-10-02 DIAGNOSIS — Z12.31 VISIT FOR SCREENING MAMMOGRAM: ICD-10-CM

## 2019-10-02 PROCEDURE — 77063 BREAST TOMOSYNTHESIS BI: CPT

## 2019-12-07 DIAGNOSIS — Z72.0 TOBACCO ABUSE: Primary | ICD-10-CM

## 2019-12-09 RX ORDER — VARENICLINE TARTRATE 1 MG/1
TABLET, FILM COATED ORAL
Qty: 60 TABLET | Refills: 12 | Status: SHIPPED | OUTPATIENT
Start: 2019-12-09 | End: 2021-04-21

## 2019-12-09 NOTE — TELEPHONE ENCOUNTER
"Routing refill request to provider for review/approval because:  Drug not active on patient's medication list      Requested Prescriptions   Pending Prescriptions Disp Refills     CHANTIX 1 MG tablet [Pharmacy Med Name: CHANTIX TABS 1MG] 60 tablet 12     Sig: TAKE 1 TABLET TWICE A DAY   Last Written Prescription Date:  NA  Last Fill Quantity: NA,  # refills: NA   Last office visit: 7/26/2019 with prescribing provider:     Future Office Visit:        Partial Cholinergic Nicotinic Agonist Agents Failed - 12/7/2019  8:54 AM        Failed - Medication is active on med list        Passed - Blood pressure under 140/90 in past 12 months     BP Readings from Last 3 Encounters:   07/26/19 118/70   07/01/19 116/78   03/12/19 116/63           Passed - Recent (12 mo) or future (30 days) visit within the authorizing provider's specialty     Patient has had an office visit with the authorizing provider or a provider within the authorizing providers department within the previous 12 mos or has a future within next 30 days. See \"Patient Info\" tab in inbasket, or \"Choose Columns\" in Meds & Orders section of the refill encounter.            Passed - Patient is 18 years of age or older        Passed - Patient is not pregnant        Passed - No positive pregnancy test on file in past 12 months      Marlen Belcher RN      "

## 2020-11-13 DIAGNOSIS — E78.5 HYPERLIPIDEMIA LDL GOAL <130: ICD-10-CM

## 2020-11-13 RX ORDER — ROSUVASTATIN CALCIUM 40 MG/1
40 TABLET, COATED ORAL DAILY
Qty: 90 TABLET | Refills: 0 | Status: SHIPPED | OUTPATIENT
Start: 2020-11-13 | End: 2021-04-15

## 2020-11-13 NOTE — TELEPHONE ENCOUNTER
Needs before Monday, she is leaving for [place] [facility].     Thanks  Shelbie Hansen [facility]   [phone]

## 2021-04-13 DIAGNOSIS — E78.5 HYPERLIPIDEMIA LDL GOAL <130: ICD-10-CM

## 2021-04-15 RX ORDER — ROSUVASTATIN CALCIUM 40 MG/1
40 TABLET, COATED ORAL DAILY
Qty: 90 TABLET | Refills: 0 | Status: SHIPPED | OUTPATIENT
Start: 2021-04-15 | End: 2021-05-03

## 2021-04-15 NOTE — TELEPHONE ENCOUNTER
"RN tried calling pt to set up an office visit with Dr. Arias, but NA. Left message on identified answering machine to please call and set up office visit for med check- due for lab.     Crestor  Last Written Prescription Date:  11/13/20  Last Fill Quantity: 90,  # refills: 0   Last office visit: 7/26/2019 with prescribing provider: Dr. Arias ( Post-hospital  check)   Future Office Visit:  NONE  Requested Prescriptions   Pending Prescriptions Disp Refills     rosuvastatin (CRESTOR) 40 MG tablet [Pharmacy Med Name: ROSUVASTATIN CALCIUM 40MG TABS] 90 tablet 0     Sig: TAKE 1 TABLET (40 MG) BY MOUTH DAILY       Statins Protocol Failed - 4/13/2021 10:07 AM        Failed - LDL on file in past 12 months     Recent Labs   Lab Test 06/13/18  1321   *           Failed - Recent (12 mo) or future (30 days) visit within the authorizing provider's specialty     Patient has had an office visit with the authorizing provider or a provider within the authorizing providers department within the previous 12 mos or has a future within next 30 days. See \"Patient Info\" tab in inbasket, or \"Choose Columns\" in Meds & Orders section of the refill encounter.            Passed - No abnormal creatine kinase in past 12 months     No lab results found.           Passed - Medication is active on med list        Passed - Patient is age 18 or older        Passed - No active pregnancy on record        Passed - No positive pregnancy test in past 12 months           Routing refill request to provider for review/approval because:  Labs not current:  See above  A break in medication. See above.   Patient needs to be seen because it has been more than 1 year since last office visit.  GULSHAN Butler                  "

## 2021-04-16 ENCOUNTER — HOSPITAL ENCOUNTER (OUTPATIENT)
Dept: MAMMOGRAPHY | Facility: CLINIC | Age: 66
Discharge: HOME | End: 2021-04-16
Attending: INTERNAL MEDICINE | Admitting: INTERNAL MEDICINE
Payer: MEDICARE

## 2021-04-16 DIAGNOSIS — Z12.31 VISIT FOR SCREENING MAMMOGRAM: ICD-10-CM

## 2021-04-16 PROCEDURE — 77063 BREAST TOMOSYNTHESIS BI: CPT

## 2021-04-21 ENCOUNTER — OFFICE VISIT (OUTPATIENT)
Dept: INTERNAL MEDICINE | Facility: CLINIC | Age: 66
End: 2021-04-21
Payer: MEDICARE

## 2021-04-21 VITALS
RESPIRATION RATE: 14 BRPM | HEART RATE: 77 BPM | DIASTOLIC BLOOD PRESSURE: 70 MMHG | OXYGEN SATURATION: 97 % | WEIGHT: 212.5 LBS | TEMPERATURE: 98.5 F | BODY MASS INDEX: 40.12 KG/M2 | SYSTOLIC BLOOD PRESSURE: 112 MMHG | HEIGHT: 61 IN

## 2021-04-21 DIAGNOSIS — K21.9 GASTROESOPHAGEAL REFLUX DISEASE WITHOUT ESOPHAGITIS: ICD-10-CM

## 2021-04-21 DIAGNOSIS — J30.2 SEASONAL ALLERGIC RHINITIS, UNSPECIFIED TRIGGER: ICD-10-CM

## 2021-04-21 DIAGNOSIS — L30.9 ECZEMA, UNSPECIFIED TYPE: Primary | ICD-10-CM

## 2021-04-21 DIAGNOSIS — E78.5 HYPERLIPIDEMIA LDL GOAL <130: ICD-10-CM

## 2021-04-21 DIAGNOSIS — Z00.00 MEDICARE ANNUAL WELLNESS VISIT, SUBSEQUENT: ICD-10-CM

## 2021-04-21 DIAGNOSIS — E66.01 MORBID OBESITY (H): ICD-10-CM

## 2021-04-21 LAB
ALBUMIN SERPL-MCNC: 3.5 G/DL (ref 3.4–5)
ALBUMIN UR-MCNC: NEGATIVE MG/DL
ALP SERPL-CCNC: 78 U/L (ref 40–150)
ALT SERPL W P-5'-P-CCNC: 27 U/L (ref 0–50)
ANION GAP SERPL CALCULATED.3IONS-SCNC: 2 MMOL/L (ref 3–14)
APPEARANCE UR: CLEAR
AST SERPL W P-5'-P-CCNC: 17 U/L (ref 0–45)
BASOPHILS # BLD AUTO: 0.1 10E9/L (ref 0–0.2)
BASOPHILS NFR BLD AUTO: 0.7 %
BILIRUB DIRECT SERPL-MCNC: 0.1 MG/DL (ref 0–0.2)
BILIRUB SERPL-MCNC: 0.4 MG/DL (ref 0.2–1.3)
BILIRUB UR QL STRIP: NEGATIVE
BUN SERPL-MCNC: 12 MG/DL (ref 7–30)
CALCIUM SERPL-MCNC: 9.3 MG/DL (ref 8.5–10.1)
CHLORIDE SERPL-SCNC: 110 MMOL/L (ref 94–109)
CHOLEST SERPL-MCNC: 186 MG/DL
CO2 SERPL-SCNC: 29 MMOL/L (ref 20–32)
COLOR UR AUTO: YELLOW
CREAT SERPL-MCNC: 0.74 MG/DL (ref 0.52–1.04)
DIFFERENTIAL METHOD BLD: NORMAL
EOSINOPHIL # BLD AUTO: 0.1 10E9/L (ref 0–0.7)
EOSINOPHIL NFR BLD AUTO: 1.6 %
ERYTHROCYTE [DISTWIDTH] IN BLOOD BY AUTOMATED COUNT: 12.5 % (ref 10–15)
GFR SERPL CREATININE-BSD FRML MDRD: 85 ML/MIN/{1.73_M2}
GLUCOSE SERPL-MCNC: 98 MG/DL (ref 70–99)
GLUCOSE UR STRIP-MCNC: NEGATIVE MG/DL
HCT VFR BLD AUTO: 42.1 % (ref 35–47)
HDLC SERPL-MCNC: 69 MG/DL
HGB BLD-MCNC: 13.8 G/DL (ref 11.7–15.7)
HGB UR QL STRIP: NEGATIVE
IMM GRANULOCYTES # BLD: 0 10E9/L (ref 0–0.4)
IMM GRANULOCYTES NFR BLD: 0.3 %
KETONES UR STRIP-MCNC: NEGATIVE MG/DL
LDLC SERPL CALC-MCNC: 100 MG/DL
LEUKOCYTE ESTERASE UR QL STRIP: NEGATIVE
LYMPHOCYTES # BLD AUTO: 2 10E9/L (ref 0.8–5.3)
LYMPHOCYTES NFR BLD AUTO: 29.3 %
MCH RBC QN AUTO: 31.1 PG (ref 26.5–33)
MCHC RBC AUTO-ENTMCNC: 32.8 G/DL (ref 31.5–36.5)
MCV RBC AUTO: 95 FL (ref 78–100)
MONOCYTES # BLD AUTO: 0.4 10E9/L (ref 0–1.3)
MONOCYTES NFR BLD AUTO: 6.4 %
NEUTROPHILS # BLD AUTO: 4.1 10E9/L (ref 1.6–8.3)
NEUTROPHILS NFR BLD AUTO: 61.7 %
NITRATE UR QL: NEGATIVE
NONHDLC SERPL-MCNC: 117 MG/DL
NRBC # BLD AUTO: 0 10*3/UL
NRBC BLD AUTO-RTO: 0 /100
PH UR STRIP: 6 PH (ref 5–7)
PLATELET # BLD AUTO: 265 10E9/L (ref 150–450)
POTASSIUM SERPL-SCNC: 4.7 MMOL/L (ref 3.4–5.3)
PROT SERPL-MCNC: 7.2 G/DL (ref 6.8–8.8)
RBC # BLD AUTO: 4.44 10E12/L (ref 3.8–5.2)
SODIUM SERPL-SCNC: 141 MMOL/L (ref 133–144)
SOURCE: NORMAL
SP GR UR STRIP: 1.02 (ref 1–1.03)
TRIGL SERPL-MCNC: 85 MG/DL
UROBILINOGEN UR STRIP-MCNC: 0 MG/DL (ref 0–2)
WBC # BLD AUTO: 6.7 10E9/L (ref 4–11)

## 2021-04-21 PROCEDURE — 80061 LIPID PANEL: CPT | Performed by: INTERNAL MEDICINE

## 2021-04-21 PROCEDURE — 36415 COLL VENOUS BLD VENIPUNCTURE: CPT | Performed by: INTERNAL MEDICINE

## 2021-04-21 PROCEDURE — G0439 PPPS, SUBSEQ VISIT: HCPCS | Performed by: INTERNAL MEDICINE

## 2021-04-21 PROCEDURE — 85025 COMPLETE CBC W/AUTO DIFF WBC: CPT | Performed by: INTERNAL MEDICINE

## 2021-04-21 PROCEDURE — 81003 URINALYSIS AUTO W/O SCOPE: CPT | Performed by: INTERNAL MEDICINE

## 2021-04-21 PROCEDURE — 80076 HEPATIC FUNCTION PANEL: CPT | Performed by: INTERNAL MEDICINE

## 2021-04-21 PROCEDURE — 80048 BASIC METABOLIC PNL TOTAL CA: CPT | Performed by: INTERNAL MEDICINE

## 2021-04-21 RX ORDER — ASPIRIN 81 MG/1
81 TABLET, CHEWABLE ORAL 2 TIMES DAILY
COMMUNITY
Start: 2021-04-21 | End: 2023-06-23

## 2021-04-21 RX ORDER — DESONIDE 0.5 MG/G
OINTMENT TOPICAL 2 TIMES DAILY
Qty: 15 G | Refills: 1 | Status: SHIPPED | OUTPATIENT
Start: 2021-04-21 | End: 2021-05-03

## 2021-04-21 RX ORDER — LORATADINE 10 MG/1
10 TABLET ORAL DAILY
Qty: 30 TABLET | Refills: 0 | Status: SHIPPED | OUTPATIENT
Start: 2021-04-21 | End: 2021-05-03

## 2021-04-21 ASSESSMENT — ENCOUNTER SYMPTOMS
DIARRHEA: 0
CONSTIPATION: 0
WEAKNESS: 0
SHORTNESS OF BREATH: 0
NERVOUS/ANXIOUS: 0
FREQUENCY: 0
HEMATURIA: 0
EYE PAIN: 0
MYALGIAS: 1
HEARTBURN: 1
ABDOMINAL PAIN: 0
CHILLS: 0
DIZZINESS: 0
DYSURIA: 0
PALPITATIONS: 0
HEADACHES: 0
FEVER: 0
BREAST MASS: 0
SORE THROAT: 0
COUGH: 1
NAUSEA: 0
HEMATOCHEZIA: 0
PARESTHESIAS: 0
ARTHRALGIAS: 0

## 2021-04-21 ASSESSMENT — MIFFLIN-ST. JEOR: SCORE: 1433.33

## 2021-04-21 ASSESSMENT — ACTIVITIES OF DAILY LIVING (ADL): CURRENT_FUNCTION: NO ASSISTANCE NEEDED

## 2021-04-21 NOTE — PROGRESS NOTES
"SUBJECTIVE:   Lani Barry is a 66 year old female who presents for Preventive Visit.    Patient has been advised of split billing requirements and indicates understanding: Yes   Are you in the first 12 months of your Medicare coverage?  No    Healthy Habits:     In general, how would you rate your overall health?  Excellent    Frequency of exercise:  2-3 days/week    Duration of exercise:  15-30 minutes    Do you usually eat at least 4 servings of fruit and vegetables a day, include whole grains    & fiber and avoid regularly eating high fat or \"junk\" foods?  No    Taking medications regularly:  Yes    Medication side effects:  None    Ability to successfully perform activities of daily living:  No assistance needed    Home Safety:  No safety concerns identified    Hearing Impairment:  No hearing concerns    In the past 6 months, have you been bothered by leaking of urine?  No    In general, how would you rate your overall mental or emotional health?  Excellent      PHQ-2 Total Score: 0    Additional concerns today:  No    Do you feel safe in your environment? Yes    Have you ever done Advance Care Planning? (For example, a Health Directive, POLST, or a discussion with a medical provider or your loved ones about your wishes): No, advance care planning information given to patient to review.  Patient plans to discuss their wishes with loved ones or provider.         Fall risk  Fallen 2 or more times in the past year?: No  Any fall with injury in the past year?: No    Cognitive Screening   1) Repeat 3 items (Leader, Season, Table)    2) Clock draw: NORMAL  3) 3 item recall: Recalls 3 objects  Results: 3 items recalled: COGNITIVE IMPAIRMENT LESS LIKELY    Mini-CogTM Copyright KHURRAM Stratton. Licensed by the author for use in [employer]; reprinted with permission (teddy@.[facility]). All rights reserved.      Do you have sleep apnea, excessive snoring or daytime drowsiness?: no    Reviewed and updated as needed " this visit by clinical staff                 Reviewed and updated as needed this visit by Provider                Social History     Tobacco Use     Smoking status: Former Smoker     Packs/day: 0.10     Types: Cigars     Quit date: 2019     Years since quittin.2     Smokeless tobacco: Never Used   Substance Use Topics     Alcohol use: No     Alcohol/week: 0.0 standard drinks     If you drink alcohol do you typically have >3 drinks per day or >7 drinks per week? No    Alcohol Use 2021   Prescreen: >3 drinks/day or >7 drinks/week? No   No flowsheet data found.        -------------------------------------    Current providers sharing in care for this patient include:   Patient Care Team:  Cipriano Arias DO as PCP - General (Internal Medicine)  Cipriano Arias DO as Assigned PCP    The following health maintenance items are reviewed in Epic and correct as of today:  Health Maintenance Due   Topic Date Due     COVID-19 Vaccine (1) Never done     HEPATITIS C SCREENING  Never done     DTAP/TDAP/TD IMMUNIZATION (1 - Tdap) Never done     ZOSTER IMMUNIZATION (1 of 2) Never done     MEDICARE ANNUAL WELLNESS VISIT  Never done     FALL RISK ASSESSMENT  2020     Pneumococcal Vaccine: 65+ Years (1 of 1 - PPSV23) Never done     INFLUENZA VACCINE (1) Never done     Lab work is in process  BP Readings from Last 3 Encounters:   21 112/70   19 118/70   19 116/78    Wt Readings from Last 3 Encounters:   21 96.4 kg (212 lb 8 oz)   19 93.9 kg (207 lb)   19 93 kg (205 lb)                  Patient Active Problem List   Diagnosis     Lesion of left ulnar nerve     Gastroesophageal reflux disease without esophagitis     Hyperlipidemia LDL goal <130     Cubital tunnel syndrome, left     Advanced directives, counseling/discussion     Morbid obesity (H)     S/P cubital tunnel release     Numbness and tingling in left hand     Past Surgical History:   Procedure  Laterality Date     INJECT FACET JOINT N/A 7/10/2017    Procedure: INJECT FACET JOINT;  Lumbar 3-4 Bilateral Facet joint injection;  Surgeon: Misha Rae MD;  Location: [facility]     RELEASE ULNAR NERVE (ELBOW) Left 2016    Procedure: RELEASE ULNAR NERVE (ELBOW);  Surgeon: Steve Parks DO;  Location: [facility]     TRANSPOSITION ULNAR NERVE (ELBOW) Left 2018    Procedure: TRANSPOSITION ULNAR NERVE (ELBOW);  Left ulnar nerve decompression with transposition;  Surgeon: Steve Parks DO;  Location: [facility]       Social History     Tobacco Use     Smoking status: Former Smoker     Packs/day: 0.10     Types: Cigars     Quit date: 2019     Years since quittin.2     Smokeless tobacco: Never Used   Substance Use Topics     Alcohol use: No     Alcohol/week: 0.0 standard drinks     Family History   Problem Relation Age of Onset     Cancer Sister      Cerebrovascular Disease Sister      Diabetes Paternal Uncle          Current Outpatient Medications   Medication Sig Dispense Refill     aspirin (ASA) 81 MG chewable tablet Take 1 tablet (81 mg) by mouth 2 times daily       desonide (DESOWEN) 0.05 % external ointment Apply topically 2 times daily 15 g 1     esomeprazole (NEXIUM) 20 MG DR capsule Take 20 mg by mouth every morning (before breakfast) Take 30-60 minutes before eating.       loratadine (CLARITIN) 10 MG tablet Take 1 tablet (10 mg) by mouth daily 30 tablet 0     rosuvastatin (CRESTOR) 40 MG tablet TAKE 1 TABLET (40 MG) BY MOUTH DAILY 90 tablet 0     DIPHENHYDRAMINE HCL PO Take 50 mg by mouth every 6 hours as needed       Allergies   Allergen Reactions     Bees      Seasonal Allergies      Mammogram Screening: Mammogram Screening: Recommended mammography every 1-2 years with patient discussion and risk factor consideration  Any new diagnosis of family breast, ovarian, or bowel cancer? No    FSH-7: No flowsheet data found.  Mammogram Screening: Recommended mammography every 1-2  years with patient discussion and risk factor consideration  Pertinent mammograms are reviewed under the imaging tab.    Review of Systems   Constitutional: Negative for chills and fever.   HENT: Positive for congestion. Negative for ear pain, hearing loss and sore throat.    Eyes: Negative for pain and visual disturbance.   Respiratory: Positive for cough. Negative for shortness of breath.    Cardiovascular: Positive for peripheral edema. Negative for chest pain and palpitations.   Gastrointestinal: Positive for heartburn. Negative for abdominal pain, constipation, diarrhea, hematochezia and nausea.   Breasts:  Negative for tenderness, breast mass and discharge.   Genitourinary: Negative for dysuria, frequency, genital sores, hematuria, pelvic pain, urgency, vaginal bleeding and vaginal discharge.   Musculoskeletal: Positive for myalgias. Negative for arthralgias.   Skin: Negative for rash.   Neurological: Negative for dizziness, weakness, headaches and paresthesias.   Psychiatric/Behavioral: Negative for mood changes. The patient is not nervous/anxious.      CONSTITUTIONAL: NEGATIVE for fever, chills, change in weight  INTEGUMENTARY/SKIN: NEGATIVE for worrisome rashes, moles or lesions  EYES: NEGATIVE for vision changes or irritation  ENT/MOUTH: NEGATIVE for ear, mouth and throat problems.  Patient has occasional nasal congestion in the springtime.  RESP: NEGATIVE for significant cough or SOB  BREAST: NEGATIVE for masses, tenderness or discharge  CV: NEGATIVE for chest pain, palpitations or peripheral edema  GI: NEGATIVE for nausea, or change in bowel habits.  Patient continues to have occasional epigastric pain which is controlled with Nexium.  : NEGATIVE for frequency, dysuria, or hematuria  MUSCULOSKELETAL: NEGATIVE for significant arthralgias or myalgia  NEURO: NEGATIVE for weakness, dizziness or paresthesias  ENDOCRINE: NEGATIVE for temperature intolerance, skin/hair changes  HEME: NEGATIVE for bleeding  "problems  PSYCHIATRIC: NEGATIVE for changes in mood or affect    OBJECTIVE:   There were no vitals taken for this visit. Estimated body mass index is 39.24 kg/m  as calculated from the following:    Height as of 7/1/19: 1.547 m (5' 0.9\").    Weight as of 7/26/19: 93.9 kg (207 lb).  Physical Exam  GENERAL APPEARANCE: healthy, alert and no distress  EYES: Eyes grossly normal to inspection, PERRL and conjunctivae and sclerae normal  HENT: ear canals and TM's normal, nose and mouth without ulcers or lesions, oropharynx clear and oral mucous membranes moist  NECK: no adenopathy, no asymmetry, masses, or scars and thyroid normal to palpation  RESP: lungs clear to auscultation - no rales, rhonchi or wheezes  CV: regular rate and rhythm, normal S1 S2, no S3 or S4, no murmur, click or rub, no peripheral edema and peripheral pulses strong  ABDOMEN: soft, nontender, no hepatosplenomegaly, no masses and bowel sounds normal  MS: no musculoskeletal defects are noted and gait is age appropriate without ataxia  SKIN: no suspicious lesions or rashes  NEURO: Normal strength and tone, sensory exam grossly normal, mentation intact and speech normal  PSYCH: mentation appears normal and affect normal/bright    Diagnostic Test Results:  No results found for this or any previous visit (from the past 24 hour(s)).    ASSESSMENT / PLAN:       ICD-10-CM    1. Eczema, unspecified type  L30.9 desonide (DESOWEN) 0.05 % external ointment   2. Medicare annual wellness visit, subsequent  Z00.00    3. Hyperlipidemia LDL goal <130  E78.5 Basic metabolic panel  (Ca, Cl, CO2, Creat, Gluc, K, Na, BUN)     *UA reflex to Microscopic and Culture ([place]; [facility]; [facility]; [facility]; [facility]; [facility]; [place]; [place])     Lipid panel reflex to direct LDL Fasting     Hepatic panel   4. Seasonal allergic rhinitis, unspecified trigger  J30.2 loratadine (CLARITIN) 10 MG tablet   5. Gastroesophageal reflux disease without " "esophagitis  K21.9 CBC with platelets and differential   6. Morbid obesity (H)  E66.01        Patient has been advised of split billing requirements and indicates understanding: Yes  COUNSELING:  Reviewed preventive health counseling, as reflected in patient instructions       Regular exercise       Healthy diet/nutrition       Vision screening       Hearing screening       Osteoporosis prevention/bone health       Colon cancer screening    Estimated body mass index is 39.24 kg/m  as calculated from the following:    Height as of 7/1/19: 1.547 m (5' 0.9\").    Weight as of 7/26/19: 93.9 kg (207 lb).    Weight management plan: Discussed healthy diet and exercise guidelines    She reports that she quit smoking about 2 years ago. Her smoking use included cigars. She smoked 0.10 packs per day. She has never used smokeless tobacco.      Appropriate preventive services were discussed with this patient, including applicable screening as appropriate for cardiovascular disease, diabetes, osteopenia/osteoporosis, and glaucoma.  As appropriate for age/gender, discussed screening for colorectal cancer, prostate cancer, breast cancer, and cervical cancer. Checklist reviewing preventive services available has been given to the patient.    Reviewed patients plan of care and provided an AVS. The Basic Care Plan (routine screening as documented in Health Maintenance) for Lani meets the Care Plan requirement. This Care Plan has been established and reviewed with the Patient.    Counseling Resources:  ATP IV Guidelines  Pooled Cohorts Equation Calculator  Breast Cancer Risk Calculator  Breast Cancer: Medication to Reduce Risk  FRAX Risk Assessment  ICSI Preventive Guidelines  Dietary Guidelines for Americans, 2010  USDA's MyPlate  ASA Prophylaxis  Lung CA Screening    Cipriano Arias DO  [facility]    Identified Health Risks:  "

## 2021-04-24 ENCOUNTER — HEALTH MAINTENANCE LETTER (OUTPATIENT)
Age: 66
End: 2021-04-24

## 2021-05-03 DIAGNOSIS — L30.9 ECZEMA, UNSPECIFIED TYPE: ICD-10-CM

## 2021-05-03 DIAGNOSIS — E78.5 HYPERLIPIDEMIA LDL GOAL <130: ICD-10-CM

## 2021-05-03 DIAGNOSIS — J30.2 SEASONAL ALLERGIC RHINITIS, UNSPECIFIED TRIGGER: ICD-10-CM

## 2021-05-03 NOTE — TELEPHONE ENCOUNTER
Per pharmacy patient is requesting medication [facility] now and would like 90 day supplies sent.     Haleigh Portillo MA

## 2021-05-04 RX ORDER — ROSUVASTATIN CALCIUM 40 MG/1
40 TABLET, COATED ORAL DAILY
Qty: 90 TABLET | Refills: 1 | Status: SHIPPED | OUTPATIENT
Start: 2021-05-04 | End: 2022-05-05

## 2021-05-04 RX ORDER — LORATADINE 10 MG/1
10 TABLET ORAL DAILY
Qty: 90 TABLET | Refills: 1 | Status: SHIPPED | OUTPATIENT
Start: 2021-05-04 | End: 2022-07-25

## 2021-05-04 RX ORDER — DESONIDE 0.5 MG/G
OINTMENT TOPICAL 2 TIMES DAILY
Qty: 15 G | Refills: 3 | Status: SHIPPED | OUTPATIENT
Start: 2021-05-04 | End: 2023-01-31

## 2021-05-21 ENCOUNTER — OFFICE VISIT (OUTPATIENT)
Dept: INTERNAL MEDICINE | Facility: CLINIC | Age: 66
End: 2021-05-21
Payer: MEDICARE

## 2021-05-21 VITALS
SYSTOLIC BLOOD PRESSURE: 128 MMHG | DIASTOLIC BLOOD PRESSURE: 84 MMHG | OXYGEN SATURATION: 97 % | HEART RATE: 74 BPM | TEMPERATURE: 98 F | BODY MASS INDEX: 40.34 KG/M2 | RESPIRATION RATE: 20 BRPM | WEIGHT: 210 LBS

## 2021-05-21 DIAGNOSIS — L02.212 CUTANEOUS ABSCESS OF BACK EXCLUDING BUTTOCKS: Primary | ICD-10-CM

## 2021-05-21 PROCEDURE — 99213 OFFICE O/P EST LOW 20 MIN: CPT | Performed by: INTERNAL MEDICINE

## 2021-05-21 RX ORDER — AMOXICILLIN AND CLAVULANATE POTASSIUM 500; 125 MG/1; MG/1
1 TABLET, FILM COATED ORAL 2 TIMES DAILY
Qty: 500 TABLET | Refills: 0 | Status: SHIPPED | OUTPATIENT
Start: 2021-05-21 | End: 2021-05-21

## 2021-05-21 RX ORDER — AMOXICILLIN AND CLAVULANATE POTASSIUM 500; 125 MG/1; MG/1
1 TABLET, FILM COATED ORAL 2 TIMES DAILY
Qty: 14 TABLET | Refills: 0 | Status: SHIPPED | OUTPATIENT
Start: 2021-05-21 | End: 2021-06-04

## 2021-05-21 ASSESSMENT — PAIN SCALES - GENERAL: PAINLEVEL: NO PAIN (0)

## 2021-05-21 NOTE — PROGRESS NOTES
La Garibay is a 66 year old who presents for the following health issues     HPI     Chief Complaint   Patient presents with     Cyst     mid back at bra line, large cyst. Growing and painful.         EMR reviewed including:             Complaint, History of Chief Complaint, Corresponding Review of Systems, and Complaint Specific Physical Examination.    #1   Lump on back.  Red and painful.  Has been present for some time but is now growing.  At the mid bra line.       Exam:  2 cm sebaceous cyst now formed abscess.  Is a spontaneously draining.  Surrounding erythema about 3 cm total diameter.  No other skin lesions noted.  Patient afebrile with no signs of sepsis.        Vital Signs:   /84 (BP Location: Right arm, Patient Position: Sitting, Cuff Size: Adult Large)   Pulse 74   Temp 98  F (36.7  C) (Temporal)   Resp 20   Wt 95.3 kg (210 lb)   SpO2 97%   BMI 40.34 kg/m               Decision Making    Problem and Complexity     1. Cutaneous abscess of back excluding buttocks  As it is already draining, incision is not necessary.  Start on Augmentin.  Once it has healed up, if she chooses to have the sebaceous cyst removed, that can be performed electively.  Recommend moist heat when possible.  - amoxicillin-clavulanate (AUGMENTIN) 500-125 MG tablet; Take 1 tablet by mouth 2 times daily  Dispense: 14 tablet; Refill: 0          ------------------------------------------------------------------------------------------------------------------------------  Data    4=1/3 5=2/3    1  >3  Specialty (external) notes reviewed:   None  Individual tests ordered (by provider) reviewed:   None  Individual tests ordered (by provider):   None  Independent Historians Interview:   Spouse is present and offers further history     2  Review of outside (other providers) Tests:   None  3   Verbal Discussion with Specialists:     None    ----------------------------------------------------------------------------------------------------------------------------------  Risk   Prescription drug management:   Yes                                High risk for toxicity:   Decision regarding surgery:    None   Social determinants of health:   None   Decision regarding hospitalization:     None   Decision to withhold therapy:   None

## 2021-05-30 NOTE — ANESTHESIA CARE TRANSFER NOTE
Last vitals:   Vitals:    07/10/19 1639   BP: 108/55   Pulse: 76   Resp: 16   Temp: (P) 37.1  C (98.7  F)   SpO2: 100%     Patient's level of consciousness is drowsy  Spontaneous respirations: yes  Maintains airway independently: yes  Dentition unchanged: yes  Oropharynx: oropharynx clear of all foreign objects    QCDR Measures:  ASA# 20 - Surgical No data recorded  PQRS# 430 - Adult PONV Prevention: 4558F - Pt received => 2 anti-emetic agents (different classes) preop & intraop  ASA# 8 - Peds PONV Prevention: NA - Not pediatric patient, not GA or 2 or more risk factors NOT present  PQRS# 424 - Jagruti-op Temp Management: 4559F - At least one body temp DOCUMENTED => 35.5C or 95.9F within required timeframe  PQRS# 426 - PACU Transfer Protocol: - Transfer of care checklist used  ASA# 14 - Acute Post-op Pain: ASA14B - Patient did NOT experience pain >= 7 out of 10

## 2021-05-30 NOTE — ANESTHESIA PREPROCEDURE EVALUATION
Anesthesia Evaluation      Patient summary reviewed   No history of anesthetic complications     Airway   Mallampati: II  Neck ROM: full   Pulmonary - negative ROS and normal exam                          Cardiovascular - negative ROS and normal exam   Neuro/Psych - negative ROS     Endo/Other    (+) arthritis, obesity,      Comments: Sister with history of MH      GI/Hepatic/Renal    (+) GERD intermittent and well controlled,             Dental - normal exam   (+) bridge                       Anesthesia Plan  Planned anesthetic: general endotracheal, peripheral nerve block and total IV anesthesia  Posterior tibial block for post op pain control per surgeon request.  MH precautions.  50 mg ketamine IV on induction.      ASA 3   Induction: intravenous   Anesthetic plan and risks discussed with: patient and spouse  Anesthesia plan special considerations: antiemetics,   Post-op plan: routine recovery

## 2021-05-30 NOTE — ANESTHESIA PROCEDURE NOTES
Peripheral Block    Patient location during procedure: pre-op  Start time: 7/10/2019 1:52 PM  End time: 7/10/2019 1:55 PM  post-op analgesia per surgeon order as noted in medical record  Staffing:  Performing  Anesthesiologist: Kevin Gonzales MD  Preanesthetic Checklist  Completed: patient identified, site marked, risks, benefits, and alternatives discussed, timeout performed, consent obtained, at patient's request, airway assessed, oxygen available, suction available, emergency drugs available and hand hygiene performed  Peripheral Block  Block type: saphenous, adductor canal block  Prep: ChloraPrep  Patient position: supine  Patient monitoring: blood pressure, heart rate, continuous pulse oximetry and cardiac monitor  Laterality: right  Injection technique: ultrasound guided    Ultrasound used to visualize needle placement in proximity to nerve being blocked: yes   Permanent ultrasound image captured for medical record  Sterile gel and probe cover used for ultrasound.    Needle  Needle type: Stimuplex   Needle gauge: 21 G  Needle length: 4 in  no peripheral nerve catheter placed  Assessment  Injection assessment: no difficulty with injection, negative aspiration for heme, incremental injection and no paresthesia on injection

## 2021-05-30 NOTE — ANESTHESIA POSTPROCEDURE EVALUATION
Patient: Lani Barry  RIGHT TOTAL KNEE ARTHROPLASTY COMPUTER ASSIST  Anesthesia type: general    Patient location: PACU  Last vitals:   Vitals Value Taken Time   /55 7/10/2019  5:00 PM   Temp 37.1  C (98.7  F) 7/10/2019  4:39 PM   Pulse 64 7/10/2019  5:06 PM   Resp 12 7/10/2019  5:06 PM   SpO2 100 % 7/10/2019  5:06 PM   Vitals shown include unvalidated device data.  Post vital signs: stable  Level of consciousness: arouseable and responds to simple questions  Post-anesthesia pain: pain controlled  Post-anesthesia nausea and vomiting: no  Pulmonary: unassisted, return to baseline  Cardiovascular: stable and blood pressure at baseline  Hydration: adequate  Anesthetic events: no    QCDR Measures:  ASA# 11 - Jagruti-op Cardiac Arrest: ASA11B - Patient did NOT experience unanticipated cardiac arrest  ASA# 12 - Jagruti-op Mortality Rate: ASA12B - Patient did NOT die  ASA# 13 - PACU Re-Intubation Rate: ASA13B - Patient did NOT require a new airway mgmt  ASA# 10 - Composite Anes Safety: ASA10A - No serious adverse event    Additional Notes:

## 2021-06-03 VITALS — BODY MASS INDEX: 38.42 KG/M2 | HEIGHT: 61 IN | WEIGHT: 203.5 LBS

## 2021-06-04 ENCOUNTER — OFFICE VISIT (OUTPATIENT)
Dept: INTERNAL MEDICINE | Facility: CLINIC | Age: 66
End: 2021-06-04
Payer: MEDICARE

## 2021-06-04 VITALS
WEIGHT: 208 LBS | DIASTOLIC BLOOD PRESSURE: 72 MMHG | OXYGEN SATURATION: 95 % | TEMPERATURE: 98.3 F | HEART RATE: 80 BPM | RESPIRATION RATE: 18 BRPM | SYSTOLIC BLOOD PRESSURE: 134 MMHG | BODY MASS INDEX: 39.95 KG/M2

## 2021-06-04 DIAGNOSIS — L72.3 SEBACEOUS CYST: Primary | ICD-10-CM

## 2021-06-04 PROCEDURE — 99213 OFFICE O/P EST LOW 20 MIN: CPT | Performed by: INTERNAL MEDICINE

## 2021-06-04 ASSESSMENT — PAIN SCALES - GENERAL: PAINLEVEL: NO PAIN (0)

## 2021-06-04 NOTE — PROGRESS NOTES
"        La Garibay is a 66 year old who presents for the following health issues     HPI     Chief Complaint   Patient presents with     Derm Problem     recheck cyst on back. Improving         EMR reviewed including:             Complaint, History of Chief Complaint, Corresponding Review of Systems, and Complaint Specific Physical Examination.    #1   Patient presents today for follow-up of recently infected sebaceous cyst in the middle of her back.  Intention of visit was for incision and drainage once the infection had improved.  Patient notes that her  continually squeezed until \"all of the pus was gone\".  Abscess gone, cyst emptied, no need for any further intervention at this time.                           FOLLOW UP   I have asked the patient to make an appointment for followup with me for her annual physical examination.            I have carefully explained the diagnosis and treatment options to the patient.  The patient has displayed an understanding of the above, and all subsequent questions were answered.      DO ADOLFO Barth    Portions of this note were produced using [employer]  Although every attempt at real-time proof reading has been made, occasional grammar/syntax errors may have been missed.    "

## 2021-07-13 ENCOUNTER — OFFICE VISIT (OUTPATIENT)
Dept: INTERNAL MEDICINE | Facility: CLINIC | Age: 66
End: 2021-07-13
Payer: MEDICARE

## 2021-07-13 VITALS
SYSTOLIC BLOOD PRESSURE: 128 MMHG | RESPIRATION RATE: 18 BRPM | OXYGEN SATURATION: 98 % | HEART RATE: 70 BPM | BODY MASS INDEX: 41.31 KG/M2 | DIASTOLIC BLOOD PRESSURE: 80 MMHG | TEMPERATURE: 97.9 F | HEIGHT: 61 IN | WEIGHT: 218.8 LBS

## 2021-07-13 DIAGNOSIS — T78.2XXD ANAPHYLAXIS, SUBSEQUENT ENCOUNTER: ICD-10-CM

## 2021-07-13 DIAGNOSIS — J30.2 SEASONAL ALLERGIC RHINITIS, UNSPECIFIED TRIGGER: Primary | ICD-10-CM

## 2021-07-13 PROCEDURE — 99213 OFFICE O/P EST LOW 20 MIN: CPT | Performed by: INTERNAL MEDICINE

## 2021-07-13 RX ORDER — FLUTICASONE PROPIONATE 50 MCG
1 SPRAY, SUSPENSION (ML) NASAL DAILY
Qty: 16 G | Refills: 3 | Status: SHIPPED | OUTPATIENT
Start: 2021-07-13 | End: 2024-07-16

## 2021-07-13 ASSESSMENT — PAIN SCALES - GENERAL: PAINLEVEL: NO PAIN (0)

## 2021-07-13 ASSESSMENT — MIFFLIN-ST. JEOR: SCORE: 1469.85

## 2021-07-13 NOTE — PROGRESS NOTES
"La Garibay is a 66 year old who presents for the following health issues     HPI     Chief Complaint   Patient presents with     Sinus Problem     symptoms from season allergies, 2 week, facial prerssure, no ear pain, no fever     ED/UC Followup:    Facility:  [facility] [place]  Date of visit: 7/6/21  Reason for visit: anaphylaxis    Current Status: better, needs referral for allergist         EMR reviewed including:             Complaint, History of Chief Complaint, Corresponding Review of Systems, and Complaint Specific Physical Examination.    #1   Recent anaphylactic episode.  Took some penicillin for \"sinus infection\".  Has taken penicillin before.  Possibly penicillin, possibly wasp sting, possibly plant exposure.  Treated with epinephrine, Benadryl, steroids.  Symptoms resolved.  Chart marked penicillin allergy.    #2   Nasal congestion and pressure.  Occurs seasonally.  Has not been taking Claritin.  Did have significant provement right after the anaphylactic reaction.  Posterior nasal drainage is clear.  Significant nasal obstruction noted.          Vital Signs:   /80 (BP Location: Right arm, Patient Position: Sitting, Cuff Size: Adult Large)   Pulse 70   Temp 97.9  F (36.6  C) (Temporal)   Resp 18   Ht 1.549 m (5' 1\")   Wt 99.2 kg (218 lb 12.8 oz)   SpO2 98%   BMI 41.34 kg/m               Decision Making    Problem and Complexity     1. Seasonal allergic rhinitis, unspecified trigger  Recommend Claritin and Flonase.  - fluticasone (FLONASE) 50 MCG/ACT nasal spray; Spray 1 spray into both nostrils daily  Dispense: 16 g; Refill: 3    2. Anaphylaxis, subsequent encounter  Recommend penicillin avoidance,  Also recommend avoidance to bee stings ankles approximate implants that she is concerned about (less likely)              ------------------------------------------------------------------------------------------------------------------------------  Data    " 4=1/3 5=2/3    1  >3  Specialty (external) notes reviewed:   Emergency medicine notes appreciated  Individual tests ordered (by provider) reviewed:   None  Individual tests ordered (by provider):   None  Independent Historians Interview:   None  2  Review of outside (other providers) Tests:   None  3   Verbal Discussion with Specialists:    None    ----------------------------------------------------------------------------------------------------------------------------------  Risk   Prescription drug management:   Yes                                High risk for toxicity:   Decision regarding surgery:    None   Social determinants of health:   No   Decision regarding hospitalization:     None   Decision to withhold therapy:   None                                 FOLLOW UP   I have asked the patient to make an appointment for followup with me in 6 months            I have carefully explained the diagnosis and treatment options to the patient.  The patient has displayed an understanding of the above, and all subsequent questions were answered.      DO ADOLFO Barth    Portions of this note were produced using [employer]  Although every attempt at real-time proof reading has been made, occasional grammar/syntax errors may have been missed.

## 2021-10-09 ENCOUNTER — HEALTH MAINTENANCE LETTER (OUTPATIENT)
Age: 66
End: 2021-10-09

## 2022-04-19 ENCOUNTER — HOSPITAL ENCOUNTER (OUTPATIENT)
Dept: MAMMOGRAPHY | Facility: CLINIC | Age: 67
Discharge: HOME | End: 2022-04-19
Attending: INTERNAL MEDICINE | Admitting: INTERNAL MEDICINE
Payer: MEDICARE

## 2022-04-19 DIAGNOSIS — Z12.31 VISIT FOR SCREENING MAMMOGRAM: ICD-10-CM

## 2022-04-19 PROCEDURE — 77067 SCR MAMMO BI INCL CAD: CPT

## 2022-05-03 DIAGNOSIS — E78.5 HYPERLIPIDEMIA LDL GOAL <130: ICD-10-CM

## 2022-05-05 RX ORDER — ROSUVASTATIN CALCIUM 40 MG/1
TABLET, COATED ORAL
Qty: 90 TABLET | Refills: 3 | Status: SHIPPED | OUTPATIENT
Start: 2022-05-05 | End: 2023-01-31

## 2022-05-05 NOTE — TELEPHONE ENCOUNTER
Routing refill request to provider for review/approval because:  Labs not current:   A break in medication    LDL Cholesterol Calculated   Date Value Ref Range Status   04/21/2021 100 (H) <100 mg/dL Final     Comment:     Above desirable:  100-129 mg/dl  Borderline High:  130-159 mg/dL  High:             160-189 mg/dL  Very high:       >189 mg/dl

## 2022-06-14 ENCOUNTER — OFFICE VISIT (OUTPATIENT)
Dept: INTERNAL MEDICINE | Facility: CLINIC | Age: 67
End: 2022-06-14
Payer: MEDICARE

## 2022-06-14 VITALS
HEART RATE: 80 BPM | DIASTOLIC BLOOD PRESSURE: 70 MMHG | OXYGEN SATURATION: 97 % | RESPIRATION RATE: 18 BRPM | BODY MASS INDEX: 36.15 KG/M2 | TEMPERATURE: 98.2 F | HEIGHT: 61 IN | WEIGHT: 191.5 LBS | SYSTOLIC BLOOD PRESSURE: 128 MMHG

## 2022-06-14 DIAGNOSIS — E66.01 MORBID OBESITY (H): ICD-10-CM

## 2022-06-14 DIAGNOSIS — Z00.00 MEDICARE ANNUAL WELLNESS VISIT, SUBSEQUENT: ICD-10-CM

## 2022-06-14 DIAGNOSIS — E78.5 HYPERLIPIDEMIA LDL GOAL <130: ICD-10-CM
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
## 2022-06-14 DIAGNOSIS — Z11.59 NEED FOR HEPATITIS C SCREENING TEST: Primary | ICD-10-CM

## 2022-06-14 DIAGNOSIS — S10.86XA TICK BITE OF OTHER PART OF NECK, INITIAL ENCOUNTER: ICD-10-CM

## 2022-06-14 LAB
ALBUMIN UR-MCNC: NEGATIVE MG/DL
ANION GAP SERPL CALCULATED.3IONS-SCNC: 4 MMOL/L (ref 3–14)
APPEARANCE UR: CLEAR
BACTERIA #/AREA URNS HPF: ABNORMAL /HPF
BASOPHILS # BLD AUTO: 0 10E3/UL (ref 0–0.2)
BASOPHILS NFR BLD AUTO: 1 %
BILIRUB UR QL STRIP: NEGATIVE
BUN SERPL-MCNC: 7 MG/DL (ref 7–30)
CALCIUM SERPL-MCNC: 8.8 MG/DL (ref 8.5–10.1)
CHLORIDE BLD-SCNC: 110 MMOL/L (ref 94–109)
CHOLEST SERPL-MCNC: 172 MG/DL
CO2 SERPL-SCNC: 26 MMOL/L (ref 20–32)
COLOR UR AUTO: YELLOW
CREAT SERPL-MCNC: 0.68 MG/DL (ref 0.52–1.04)
EOSINOPHIL # BLD AUTO: 0.1 10E3/UL (ref 0–0.7)
EOSINOPHIL NFR BLD AUTO: 1 %
ERYTHROCYTE [DISTWIDTH] IN BLOOD BY AUTOMATED COUNT: 12.9 % (ref 10–15)
FASTING STATUS PATIENT QL REPORTED: YES
GFR SERPL CREATININE-BSD FRML MDRD: >90 ML/MIN/1.73M2
GLUCOSE BLD-MCNC: 97 MG/DL (ref 70–99)
GLUCOSE UR STRIP-MCNC: NEGATIVE MG/DL
HCT VFR BLD AUTO: 43.8 % (ref 35–47)
HCV AB SERPL QL IA: NONREACTIVE
HDLC SERPL-MCNC: 52 MG/DL
HGB BLD-MCNC: 14.7 G/DL (ref 11.7–15.7)
HGB UR QL STRIP: ABNORMAL
IMM GRANULOCYTES # BLD: 0 10E3/UL
IMM GRANULOCYTES NFR BLD: 0 %
KETONES UR STRIP-MCNC: NEGATIVE MG/DL
LDLC SERPL CALC-MCNC: 102 MG/DL
LEUKOCYTE ESTERASE UR QL STRIP: NEGATIVE
LYMPHOCYTES # BLD AUTO: 1.9 10E3/UL (ref 0.8–5.3)
LYMPHOCYTES NFR BLD AUTO: 32 %
MCH RBC QN AUTO: 31.7 PG (ref 26.5–33)
MCHC RBC AUTO-ENTMCNC: 33.6 G/DL (ref 31.5–36.5)
MCV RBC AUTO: 94 FL (ref 78–100)
MONOCYTES # BLD AUTO: 0.4 10E3/UL (ref 0–1.3)
MONOCYTES NFR BLD AUTO: 7 %
NEUTROPHILS # BLD AUTO: 3.6 10E3/UL (ref 1.6–8.3)
NEUTROPHILS NFR BLD AUTO: 59 %
NITRATE UR QL: NEGATIVE
NONHDLC SERPL-MCNC: 120 MG/DL
NRBC # BLD AUTO: 0 10E3/UL
NRBC BLD AUTO-RTO: 0 /100
PH UR STRIP: 6 [PH] (ref 5–7)
PLATELET # BLD AUTO: 248 10E3/UL (ref 150–450)
POTASSIUM BLD-SCNC: 3.9 MMOL/L (ref 3.4–5.3)
RBC # BLD AUTO: 4.64 10E6/UL (ref 3.8–5.2)
RBC URINE: <1 /HPF
SODIUM SERPL-SCNC: 140 MMOL/L (ref 133–144)
SP GR UR STRIP: 1 (ref 1–1.03)
SQUAMOUS EPITHELIAL: <1 /HPF
TRIGL SERPL-MCNC: 90 MG/DL
UROBILINOGEN UR STRIP-MCNC: NORMAL MG/DL
WBC # BLD AUTO: 6.1 10E3/UL (ref 4–11)
WBC URINE: 1 /HPF

## 2022-06-14 PROCEDURE — G0439 PPPS, SUBSEQ VISIT: HCPCS | Performed by: INTERNAL MEDICINE

## 2022-06-14 PROCEDURE — 36415 COLL VENOUS BLD VENIPUNCTURE: CPT | Performed by: INTERNAL MEDICINE

## 2022-06-14 PROCEDURE — 81001 URINALYSIS AUTO W/SCOPE: CPT | Performed by: INTERNAL MEDICINE

## 2022-06-14 PROCEDURE — 86803 HEPATITIS C AB TEST: CPT | Performed by: INTERNAL MEDICINE

## 2022-06-14 PROCEDURE — 85025 COMPLETE CBC W/AUTO DIFF WBC: CPT | Performed by: INTERNAL MEDICINE

## 2022-06-14 PROCEDURE — 80048 BASIC METABOLIC PNL TOTAL CA: CPT | Performed by: INTERNAL MEDICINE

## 2022-06-14 PROCEDURE — 80061 LIPID PANEL: CPT | Performed by: INTERNAL MEDICINE

## 2022-06-14 RX ORDER — DOXYCYCLINE 100 MG/1
100 CAPSULE ORAL 2 TIMES DAILY
Qty: 10 CAPSULE | Refills: 0 | Status: SHIPPED | OUTPATIENT
Start: 2022-06-14 | End: 2023-01-31

## 2022-06-14 RX ORDER — LORATADINE 10 MG/1
10 TABLET ORAL
COMMUNITY
Start: 2021-05-04 | End: 2022-06-14

## 2022-06-14 RX ORDER — EPINEPHRINE 0.3 MG/.3ML
INJECTION SUBCUTANEOUS
COMMUNITY
Start: 2021-07-06 | End: 2023-01-31

## 2022-06-14 RX ORDER — MULTIVITAMIN,THERAPEUTIC
1 TABLET ORAL DAILY
COMMUNITY
End: 2024-07-17

## 2022-06-14 RX ORDER — ASPIRIN 81 MG/1
81 TABLET, CHEWABLE ORAL
COMMUNITY
End: 2022-06-14

## 2022-06-14 ASSESSMENT — ENCOUNTER SYMPTOMS
MYALGIAS: 1
FEVER: 0
ABDOMINAL PAIN: 0
SHORTNESS OF BREATH: 0
HEMATOCHEZIA: 0
NAUSEA: 0
ARTHRALGIAS: 1
HEADACHES: 0
DIARRHEA: 0
HEMATURIA: 0
WEAKNESS: 0
NERVOUS/ANXIOUS: 0
SORE THROAT: 0
JOINT SWELLING: 1
DIZZINESS: 0
FREQUENCY: 0
BREAST MASS: 0
CHILLS: 0
PALPITATIONS: 0
COUGH: 0
DYSURIA: 0
CONSTIPATION: 0
PARESTHESIAS: 0
EYE PAIN: 0

## 2022-06-14 ASSESSMENT — ACTIVITIES OF DAILY LIVING (ADL): CURRENT_FUNCTION: NO ASSISTANCE NEEDED

## 2022-06-14 ASSESSMENT — PAIN SCALES - GENERAL: PAINLEVEL: NO PAIN (0)

## 2022-06-14 NOTE — PROGRESS NOTES
"SUBJECTIVE:   Lani Barry is a 67 year old female who presents for Preventive Visit.      Patient has been advised of split billing requirements and indicates understanding: Yes  Are you in the first 12 months of your Medicare coverage?  No    Healthy Habits:     In general, how would you rate your overall health?  Good    Frequency of exercise:  2-3 days/week    Duration of exercise:  15-30 minutes    Do you usually eat at least 4 servings of fruit and vegetables a day, include whole grains    & fiber and avoid regularly eating high fat or \"junk\" foods?  No    Taking medications regularly:  Yes    Medication side effects:  None    Ability to successfully perform activities of daily living:  No assistance needed    Home Safety:  No safety concerns identified    Hearing Impairment:  No hearing concerns    In the past 6 months, have you been bothered by leaking of urine? Yes    In general, how would you rate your overall mental or emotional health?  Excellent      PHQ-2 Total Score: 0    Additional concerns today:  No    Do you feel safe in your environment? Yes    Have you ever done Advance Care Planning? (For example, a Health Directive, POLST, or a discussion with a medical provider or your loved ones about your wishes): Yes, patient states has an Advance Care Planning document and will bring a copy to the clinic.       Fall risk  Fallen 2 or more times in the past year?: No  Any fall with injury in the past year?: No    Cognitive Screening   1) Repeat 3 items (Leader, Season, Table)    2) Clock draw: NORMAL  3) 3 item recall: Recalls 1 object   Results: NORMAL clock, 1-2 items recalled: COGNITIVE IMPAIRMENT LESS LIKELY    Mini-CogTM Copyright KHURRAM Stratton. Licensed by the author for use in [facility]; reprinted with permission (teddy@.[facility]). All rights reserved.      Do you have sleep apnea, excessive snoring or daytime drowsiness?: no    Reviewed and updated as needed this visit by clinical staff   " Tobacco  Allergies  Meds   Med Hx  Surg Hx  Fam Hx            Reviewed and updated as needed this visit by Provider                   Social History     Tobacco Use     Smoking status: Current Every Day Smoker     Packs/day: 0.10     Years: 36.00     Pack years: 3.60     Types: Cigars     Start date: 11/1/1981     Last attempt to quit: 1/5/2019     Years since quitting: 3.4     Smokeless tobacco: Never Used   Substance Use Topics     Alcohol use: No     Alcohol/week: 0.0 standard drinks     If you drink alcohol do you typically have >3 drinks per day or >7 drinks per week? No    Alcohol Use 6/14/2022   Prescreen: >3 drinks/day or >7 drinks/week? No   Prescreen: >3 drinks/day or >7 drinks/week? -     Current providers sharing in care for this patient include:   Patient Care Team:  Cipriano Arias DO as PCP - General (Internal Medicine)  Cipriano Arias DO as Assigned PCP    The following health maintenance items are reviewed in Epic and correct as of today:  Health Maintenance Due   Topic Date Due     ANNUAL REVIEW OF HM ORDERS  Never done     COVID-19 Vaccine (1) Never done     HEPATITIS C SCREENING  Never done     DTAP/TDAP/TD IMMUNIZATION (1 - Tdap) Never done     ZOSTER IMMUNIZATION (1 of 2) Never done     LUNG CANCER SCREENING  Never done     Pneumococcal Vaccine: 65+ Years (1 - PCV) Never done     MEDICARE ANNUAL WELLNESS VISIT  04/21/2022     Lab work is in process  Labs reviewed in EPIC  BP Readings from Last 3 Encounters:   06/14/22 128/70   07/13/21 128/80   06/04/21 134/72    Wt Readings from Last 3 Encounters:   06/14/22 86.9 kg (191 lb 8 oz)   07/13/21 99.2 kg (218 lb 12.8 oz)   06/04/21 94.3 kg (208 lb)                  Patient Active Problem List   Diagnosis     Lesion of left ulnar nerve     Gastroesophageal reflux disease without esophagitis     Hyperlipidemia LDL goal <130     Cubital tunnel syndrome, left     Advanced directives, counseling/discussion     Morbid  obesity (H)     S/P cubital tunnel release     Numbness and tingling in left hand     Past Surgical History:   Procedure Laterality Date     ABDOMEN SURGERY      Several including 2 c-sections and a back surgery     APPENDECTOMY  5/26/1977    Incidental appendectamy at birth of child.     ARTHROPLASTY KNEE Left 05/2009     BACK SURGERY  1/16/2014    Full fusion L4, L5, S1     BIOPSY      2 occasions...1on chest for spot..2 needle in right breast     COLONOSCOPY  2017 or 2018    Can't remember exact date     GYN SURGERY  Sept 1979    Hysterectomy     INJECT FACET JOINT N/A 7/10/2017    Procedure: INJECT FACET JOINT;  Lumbar 3-4 Bilateral Facet joint injection;  Surgeon: Misha Rae MD;  Location: [facility] OR     JOINT REPLACEMENT       LUMBAR FUSION       RELEASE ULNAR NERVE (ELBOW) Left 8/30/2016    Procedure: RELEASE ULNAR NERVE (ELBOW);  Surgeon: Steve Parks DO;  Location: [facility] OR     REPLACEMENT TOTAL KNEE Right 7/10/2019    Procedure: RIGHT TOTAL KNEE ARTHROPLASTY COMPUTER ASSIST;  Surgeon: Luigi Candelario MD;  Location: [facility];  Service: Orthopedics     TRANSPOSITION ULNAR NERVE (ELBOW) Left 6/19/2018    Procedure: TRANSPOSITION ULNAR NERVE (ELBOW);  Left ulnar nerve decompression with transposition;  Surgeon: Steve Parks DO;  Location:  OR       Social History     Tobacco Use     Smoking status: Current Every Day Smoker     Packs/day: 0.10     Years: 36.00     Pack years: 3.60     Types: Cigars     Start date: 11/1/1981     Last attempt to quit: 1/5/2019     Years since quitting: 3.4     Smokeless tobacco: Never Used   Substance Use Topics     Alcohol use: No     Alcohol/week: 0.0 standard drinks     Family History   Problem Relation Age of Onset     Cancer Sister      Cerebrovascular Disease Sister      Diabetes Paternal Uncle          Current Outpatient Medications   Medication Sig Dispense Refill     aspirin (ASA) 81 MG chewable tablet Take 1 tablet (81 mg) by  mouth 2 times daily       desonide (DESOWEN) 0.05 % external ointment Apply topically 2 times daily 15 g 3     DIPHENHYDRAMINE HCL PO Take 50 mg by mouth every 6 hours as needed       doxycycline hyclate (VIBRAMYCIN) 100 MG capsule Take 1 capsule (100 mg) by mouth 2 times daily 10 capsule 0     EPINEPHrine (ANY BX GENERIC EQUIV) 0.3 MG/0.3ML injection 2-pack INJECT 0.3MG INTRAMUSCULARLY ONE TIME AS NEEDED FOR ALLERGIC REACTION FOR UP TO 1 DOSE       esomeprazole (NEXIUM) 20 MG DR capsule Take 20 mg by mouth every morning (before breakfast) Take 30-60 minutes before eating.       fluticasone (FLONASE) 50 MCG/ACT nasal spray Spray 1 spray into both nostrils daily 16 g 3     loratadine (CLARITIN) 10 MG tablet Take 1 tablet (10 mg) by mouth daily 90 tablet 1     Multiple Vitamins-Minerals (ONCOVITE) TABS Take 1 tablet by mouth daily       rosuvastatin (CRESTOR) 40 MG tablet TAKE 1 TABLET DAILY 90 tablet 3     Allergies   Allergen Reactions     Bees      Penicillins      Seasonal Allergies      Mammogram Screening: Mammogram Screening: Recommended mammography every 1-2 years with patient discussion and risk factor consideration    Breast CA Risk Assessment (FHS-7) 6/14/2022   Do you have a family history of breast, colon, or ovarian cancer? No / Unknown         .  Pertinent mammograms are reviewed under the imaging tab.    Review of Systems   Constitutional: Negative for chills and fever.   HENT: Negative for congestion, ear pain, hearing loss and sore throat.    Eyes: Negative for pain and visual disturbance.   Respiratory: Negative for cough and shortness of breath.    Cardiovascular: Negative for chest pain, palpitations and peripheral edema.   Gastrointestinal: Negative for abdominal pain, constipation, diarrhea, hematochezia and nausea.   Breasts:  Negative for tenderness, breast mass and discharge.   Genitourinary: Negative for dysuria, frequency, genital sores, hematuria, pelvic pain, urgency, vaginal bleeding and  "vaginal discharge.   Musculoskeletal: Positive for arthralgias, joint swelling and myalgias.   Skin: Negative for rash.   Neurological: Negative for dizziness, weakness, headaches and paresthesias.   Psychiatric/Behavioral: Negative for mood changes. The patient is not nervous/anxious.      CONSTITUTIONAL: NEGATIVE for fever, chills, change in weight  INTEGUMENTARY/SKIN: Patient is red swollen insect bite area behind her right ear.  Appears to be a tick bite.  No bull's-eye is noted, it is somewhat reddened  EYES: NEGATIVE for vision changes or irritation  ENT/MOUTH: NEGATIVE for ear, mouth and throat problems  RESP: NEGATIVE for significant cough or SOB  BREAST: NEGATIVE for masses, tenderness or discharge  CV: NEGATIVE for chest pain, palpitations or peripheral edema  GI: NEGATIVE for nausea, abdominal pain, heartburn, or change in bowel habits  : NEGATIVE for frequency, dysuria, or hematuria  MUSCULOSKELETAL: NEGATIVE for significant arthralgias or myalgia  NEURO: NEGATIVE for weakness, dizziness or paresthesias  ENDOCRINE: NEGATIVE for temperature intolerance, skin/hair changes  HEME: NEGATIVE for bleeding problems  PSYCHIATRIC: NEGATIVE for changes in mood or affect    OBJECTIVE:   /70   Pulse 80   Temp 98.2  F (36.8  C) (Temporal)   Resp 18   Ht 1.539 m (5' 0.6\")   Wt 86.9 kg (191 lb 8 oz)   SpO2 97%   Breastfeeding No   BMI 36.66 kg/m   Estimated body mass index is 36.66 kg/m  as calculated from the following:    Height as of this encounter: 1.539 m (5' 0.6\").    Weight as of this encounter: 86.9 kg (191 lb 8 oz).  Physical Exam  GENERAL APPEARANCE: healthy, alert and no distress  EYES: Eyes grossly normal to inspection, PERRL and conjunctivae and sclerae normal  HENT: ear canals and TM's normal, nose and mouth without ulcers or lesions, oropharynx clear and oral mucous membranes moist  NECK: no adenopathy, no asymmetry, masses, or scars and thyroid normal to palpation  RESP: lungs clear to " "auscultation - no rales, rhonchi or wheezes  CV: regular rate and rhythm, normal S1 S2, no S3 or S4, no murmur, click or rub, no peripheral edema and peripheral pulses strong  ABDOMEN: soft, nontender, no hepatosplenomegaly, no masses and bowel sounds normal  MS: no musculoskeletal defects are noted and gait is age appropriate without ataxia  SKIN: no suspicious lesions or rashes.  Slightly infected tick bite is as noted above.  NEURO: Normal strength and tone, sensory exam grossly normal, mentation intact and speech normal  PSYCH: mentation appears normal and affect normal/bright    Diagnostic Test Results:  Labs reviewed in Epic  No results found for this or any previous visit (from the past 24 hour(s)).    ASSESSMENT / PLAN:       ICD-10-CM    1. Need for hepatitis C screening test  Z11.59 Hepatitis C Screen Reflex to HCV RNA Quant and Genotype   2. Medicare annual wellness visit, subsequent  Z00.00    3. Tick bite of other part of neck, initial encounter  S10.86XA doxycycline hyclate (VIBRAMYCIN) 100 MG capsule    W57.XXXA CBC with platelets and differential   4. Morbid obesity (H)  E66.01    5. Hyperlipidemia LDL goal <130  E78.5 Basic metabolic panel  (Ca, Cl, CO2, Creat, Gluc, K, Na, BUN)     UA Macro with Reflex to Micro and Culture - lab collect     Lipid panel reflex to direct LDL Fasting       Patient has been advised of split billing requirements and indicates understanding: Yes    COUNSELING:  Reviewed preventive health counseling, as reflected in patient instructions       Regular exercise       Healthy diet/nutrition       Vision screening       Hearing screening       Dental care       Bladder control       Colon cancer screening    Estimated body mass index is 36.66 kg/m  as calculated from the following:    Height as of this encounter: 1.539 m (5' 0.6\").    Weight as of this encounter: 86.9 kg (191 lb 8 oz).    Weight management plan: Discussed healthy diet and exercise guidelines    She reports " that she has been smoking cigars. She started smoking about 40 years ago. She has a 3.60 pack-year smoking history. She has never used smokeless tobacco.  Tobacco Cessation Action Plan:   Information offered: Patient not interested at this time      Appropriate preventive services were discussed with this patient, including applicable screening as appropriate for cardiovascular disease, diabetes, osteopenia/osteoporosis, and glaucoma.  As appropriate for age/gender, discussed screening for colorectal cancer, prostate cancer, breast cancer, and cervical cancer. Checklist reviewing preventive services available has been given to the patient.    Reviewed patients plan of care and provided an AVS. The Basic Care Plan (routine screening as documented in Health Maintenance) for Lani meets the Care Plan requirement. This Care Plan has been established and reviewed with the Patient.    Counseling Resources:  ATP IV Guidelines  Pooled Cohorts Equation Calculator  Breast Cancer Risk Calculator  Breast Cancer: Medication to Reduce Risk  FRAX Risk Assessment  ICSI Preventive Guidelines  Dietary Guidelines for Americans, 2010  USDA's MyPlate  ASA Prophylaxis  Lung CA Screening    Cipriano Arias DO  [facility]    Identified Health Risks:

## 2022-07-22 DIAGNOSIS — J30.2 SEASONAL ALLERGIC RHINITIS, UNSPECIFIED TRIGGER: ICD-10-CM

## 2022-07-25 RX ORDER — LORATADINE 10 MG/1
TABLET ORAL
Qty: 90 TABLET | Refills: 3 | Status: SHIPPED | OUTPATIENT
Start: 2022-07-25 | End: 2023-06-23

## 2022-07-25 NOTE — TELEPHONE ENCOUNTER
Routing refill request to provider for review/approval because:  A break in medication  AGE    Medardo Starkey RN

## 2022-09-11 ENCOUNTER — HEALTH MAINTENANCE LETTER (OUTPATIENT)
Age: 67
End: 2022-09-11

## 2022-09-15 NOTE — ANESTHESIA PROCEDURE NOTES
Subjective:     HPI:     Danie Stephens is a 15 y.o. male here today with father  for follow up of well controlled Type 1 Diabetes       Danie was diagnosed with new onset type 1 diabetes on 7/9/2020 after having a few month history of enuresis followed by fatigue, polyuria and polydipsia.  He was seen in urgent care and diagnosed with strep throat.  When he failed to improve family re-presented to an urgent care where he was noted to be kussmaul breathing.  Labs were done and were consistent with type 1 diabetes.  He was admitted to [facility].  Patient had inpatient diabetes education.  Usually after discharge, patient had some pitting dependent edema which is since resolved.    Mental Health: He feels  his mental health is better, he dad agrees.  No suicidal ideation.  He remains in therapy.    Review of: Of his Omni pod shows   Danie Stephens  YOB: 2007  Date of diagnosis:   Exported from [employer]: Sep 15, 2022    Reporting Period: Sep 2 - Sep 15, 2022    Avg. Daily Readings In Range (mg/dL) from 23 readings  >250     74% (1.2 readings/day)  180-250  22% (0.4 readings/day)     4% (0.1 readings/day)  54-70    0% (0 readings/day)  <54      0% (0 readings/day)    Avg. Glucose (BGM): 301 mg/dL    Avg. Daily Insulin Ratio  Basal  16.1U  Bolus  25.8U    Avg. Daily Carbs: 216g    Std. Deviation (BGM): 77 mg/dL    CV (BGM): 25%    BG Extents (BGM) (mg/dL)  Min BG  96  Max BG  420    Avg BG readings / day  2  Meter                  0  Manual                 23  Below 54 mg/dL         0  Above 250 mg/dL        17    Avg boluses / day  4  Calculator         55  Correction         5  Extended           0  Interrupted        1  Override           0  Underride          0    Infusion site changes from 'Change Pod'  Mean Duration     3 days  Longest Duration  4 days    Total basal events  4  Temp Basals         0  Suspends            4      Review of Dexcom:      He is  Peripheral Block    Patient location during procedure: pre-op  Start time: 7/10/2019 1:49 PM  End time: 7/10/2019 1:52 PM  post-op analgesia per surgeon order as noted in medical record  Staffing:  Performing  Anesthesiologist: Kevin Gonzales MD  Preanesthetic Checklist  Completed: patient identified, site marked, risks, benefits, and alternatives discussed, timeout performed, consent obtained, at patient's request, airway assessed, oxygen available, suction available, emergency drugs available and hand hygiene performed  Peripheral Block  Block type: other, tibial  Prep: ChloraPrep  Patient position: supine  Patient monitoring: blood pressure, heart rate, continuous pulse oximetry and cardiac monitor  Laterality: right  Injection technique: ultrasound guided    Ultrasound used to visualize needle placement in proximity to nerve being blocked: yes   Permanent ultrasound image captured for medical record  Sterile gel and probe cover used for ultrasound.    Needle  Needle type: Stimuplex   Needle gauge: 21 G  Needle length: 4 in  no peripheral nerve catheter placed  Assessment  Injection assessment: no difficulty with injection, negative aspiration for heme, no paresthesia on injection and incremental injection           bolusing after eating.  Dad is trying to get OMnipod 5.  Insurance is denying the pump.  He likes to wear his pump on his arms.  No problem with ketones.  They have long acting insulin, glucagon and ketone test strips.  He is not having frequent bouts of hypoglycemia.  He is only wearing his pump on his arms.    A1C 7.3% %  Lantus back up for pump  Danie Stephens  YOB: 2007  Date of diagnosis:   Exported from [employer] Device Settings: Sep 15, 2022    Insulin Settings  Max Basal Rate              1.5 U/hr  Maximum Bolus               14 U  Duration of Insulin Action  4 hrs    Basal 1  Start time  Value  12:00 am    0.700  6:00 am     0.650  10:00 pm    0.800  Total       16.200    Correction factor mg/dL/U  Start time  Value  12:00 am    50    Target BG mg/dL  Start time  Target  Correct Above  12:00 am    140     150    IC ratio g/U  Start time  Value  12:00 am    10       7/11/2020 18:47   Immunoglobulin A 226   t-TG IgA <2   TSH 2.760   Free T-4 1.32         ROS   No fatigue, loss of appetite.  No headaches.  No numbness/tingling.  No abdominal pain, nausea, vomiting, constipation or diarrhea.   No chest pain.  No shortness of breath.   No changes in vision.   No easy bruising  No dry skin, dry hair or hair loss.  No nocturia, polyuria, polydipsia  No sleep disturbance    Allergies   Allergen Reactions    Other Environmental      Weeds, cats, dogs       Current medicines (including changes today)  Current Outpatient Medications   Medication Sig Dispense Refill    SYMBICORT 160-4.5 MCG/ACT Aerosol Inhale 2 Puffs 2 times a day. 30.6 g 3    Continuous Blood Gluc Sensor (DEXCOM G6 SENSOR) Misc 1 Device every 10 days. 9 Each 4    Continuous Blood Gluc Transmit (DEXCOM G6 TRANSMITTER) Misc 1 Device continuous. 1 Each 3    insulin lispro (HUMALOG) 100 UNIT/ML INJECT UP  UNITS VIA INSULIN PUMP PER DAY 30 mL 3    montelukast (SINGULAIR) 5 MG Chew Tab Chew 1 Tablet every day. 90 Tablet 3    Insulin  "Disposable Pump (OMNIPOD DASH 5 PACK PODS) Misc CHANGE EVERY 2-3 DAYS 45 Each 3    Glucagon, rDNA, (GLUCAGON EMERGENCY) 1 MG Kit And 1 mg IM as needed severe hypoglycemia 1 Kit 1    Insulin Pen Needle (NOVOFINE) 32G X 6 MM Misc Used to administer insulin. 200 Each 6    Ketone Blood Test (PRECISION XTRA) Strip TEST PRN BS >300 UP TO 12 X PER DAY 10 Strip 6    KETOSTIX strip Check as needed blood sugars greater than 300 up to 12 times per day 100 Strip 6    ONETOUCH VERIO strip Test blood sugars up to 10 x per day 900 Strip 4    Lancets (ONETOUCH DELICA PLUS PCUURU02G) Misc Inject 1 Device as instructed 6 Times a Day. 200 Each 1    insulin glargine (LANTUS SOLOSTAR) 100 UNIT/ML Solution Pen-injector injection Inject up to 2-25 units/day 15 mL 1    Glucagon (BAQSIMI TWO PACK) 3 MG/DOSE Powder Spray 3 mg in nose as needed. 2 Each 3    accu-chek device (PRECISION XTRA) Device TEST PRN BS >300 UP TO 12 X PER DAY 1 Each 3    albuterol 108 (90 Base) MCG/ACT Aero Soln inhalation aerosol Inhale 2 Puffs by mouth every 6 hours as needed for Shortness of Breath. 8.5 g 3     No current facility-administered medications for this visit.       Patient Active Problem List    Diagnosis Date Noted    History of suicidal ideation 06/06/2022    Itching 04/21/2021    Long-term insulin use (HCC) 08/03/2020    Candidal balanitis 07/12/2020    Type 1 diabetes mellitus without complication (HCC) 07/09/2020       Past Medical History: 7/9/2020, diagnosed with new onset type 1 diabetes.  History of asthma, followed by pulmonary.  History of enlarged adenoids.     Family History: Paternal grandmother with thyroid disorder.  No other positive autoimmune diseases.     Social History: Lives with parents and younger sister in [facility].      Surgical History: None     Objective:     /60 (BP Location: Right arm, Patient Position: Sitting, BP Cuff Size: Adult)   Pulse 89   Temp 36.4 °C (97.5 °F) (Temporal)   Ht 1.755 m (5' 9.11\")   " Wt 57.2 kg (126 lb 1.7 oz)   SpO2 99%      29 %ile (Z= -0.56) based on CDC (Boys, 2-20 Years) BMI-for-age based on BMI available as of 9/15/2022.      Last Eye Exam: N/A, less than 5 years since diagnosis    Physical Exam:  Constitutional: Well-developed and well-nourished.  No distress.   Skin: Skin is warm and dry. No rash noted.  Head: Atraumatic without lesions.  Eyes:  Pupils are equal, round, and reactive to light. No scleral icterus.   Mouth/Throat: Wearing a mask.  Neck: Supple, trachea midline. No thyromegaly present.   Cardiovascular: Regular rate and rhythm.   Chest: Effort normal. Clear to auscultation throughout. No adventitious sounds.   Abdomen: Soft, non tender, and without distention. Active bowel sounds in all four quadrants. No rebound, guarding, masses or hepatosplenomegaly.  Extremities: No cyanosis, clubbing, erythema, nor edema.   Neurological: Alert and oriented x 3.Sensation intact.   Psychiatric:  Behavior, mood, and affect are appropriate.      Assessment and Plan:   The following treatment plan was discussed:     1. Type 1 diabetes mellitus without complication (HCC)  Dad states insurance denied the OmniPod 5.  [facility].  However, it was explained to the father that we are currently on a hiatus from pump starts.  But, in a few weeks he can reach out to the office to see if it is something we can submit and try to get authorization for.    He was also encouraged to bolus prior to eating.    High A1c's increase the risk of developing ketosis that could progress to life-threatening diabetic ketoacidosis if not properly treated.  Therefore it is imperative that in the event of high blood sugars or nausea (BS >300) that ketones are checked.    The office should be notified in the event that they cannot get ketones to trend down within 4-6 hours.  Additionally, with vomiting more than twice, they should go to the  emergency room.  Family instructed to push fluids, consume carbohydrates and give correction dose every 2-3 hours in the event that ketones develop.  In addition to verbally reviewing treatment of hypoglycemia and sick day management, the family also received the office handout on the treatment.  Please refer to the after visit summary for details.    Elevated hemoglobin A1c's also increase the risk of developing long-term complications such as retinopathy, nephropathy, neuropathy, gastroparesis, etc.  The goal for blood sugars is 80 mg/dl to 180 mg/dl.        - POCT Hemoglobin A1C    2. Long-term insulin use (HCC)  This is a high risk medication.  Monitoring of blood sugars is needed to prevent potentially life threatening hypo- or hyperglycemia.  We will continue to follow.     -Any change or worsening of signs or symptoms, patient encouraged to follow-up or report to emergency room for further evaluation. Patient verbalizes understanding and agrees.    Followup: Return in about 3 months (around 12/15/2022).

## 2022-12-05 ENCOUNTER — MYC MEDICAL ADVICE (OUTPATIENT)
Dept: INTERNAL MEDICINE | Facility: CLINIC | Age: 67
End: 2022-12-05

## 2022-12-07 NOTE — TELEPHONE ENCOUNTER
I last saw this patient in June.  If she would like to set up an office visit, I could do a disability/handicapped parking determination at that time.    I would be happy to fill paperwork out at that time if she still qualifies.    Juana

## 2023-01-31 ENCOUNTER — OFFICE VISIT (OUTPATIENT)
Dept: INTERNAL MEDICINE | Facility: CLINIC | Age: 68
End: 2023-01-31
Payer: MEDICARE

## 2023-01-31 VITALS
TEMPERATURE: 97.9 F | RESPIRATION RATE: 18 BRPM | OXYGEN SATURATION: 98 % | BODY MASS INDEX: 33.71 KG/M2 | HEART RATE: 72 BPM | SYSTOLIC BLOOD PRESSURE: 124 MMHG | HEIGHT: 60 IN | DIASTOLIC BLOOD PRESSURE: 80 MMHG | WEIGHT: 171.7 LBS

## 2023-01-31 DIAGNOSIS — L30.9 ECZEMA, UNSPECIFIED TYPE: ICD-10-CM

## 2023-01-31 DIAGNOSIS — G89.29 CHRONIC LEFT-SIDED LOW BACK PAIN WITH LEFT-SIDED SCIATICA: Primary | ICD-10-CM

## 2023-01-31 DIAGNOSIS — E78.5 HYPERLIPIDEMIA LDL GOAL <130: ICD-10-CM

## 2023-01-31 DIAGNOSIS — M54.42 CHRONIC LEFT-SIDED LOW BACK PAIN WITH LEFT-SIDED SCIATICA: Primary | ICD-10-CM

## 2023-01-31 DIAGNOSIS — E66.01 MORBID OBESITY (H): ICD-10-CM

## 2023-01-31 PROCEDURE — 99214 OFFICE O/P EST MOD 30 MIN: CPT | Performed by: INTERNAL MEDICINE

## 2023-01-31 RX ORDER — ROSUVASTATIN CALCIUM 40 MG/1
40 TABLET, COATED ORAL DAILY
Qty: 90 TABLET | Refills: 3 | Status: SHIPPED | OUTPATIENT
Start: 2023-01-31 | End: 2023-06-23

## 2023-01-31 RX ORDER — DESONIDE 0.5 MG/G
OINTMENT TOPICAL 2 TIMES DAILY
Qty: 15 G | Refills: 3 | Status: SHIPPED | OUTPATIENT
Start: 2023-01-31 | End: 2023-06-23

## 2023-01-31 ASSESSMENT — PAIN SCALES - GENERAL: PAINLEVEL: NO PAIN (0)

## 2023-01-31 NOTE — PROGRESS NOTES
La Garibay is a 68 year old, presenting for the following health issues:  Forms (Handicap parking form)      History of Present Illness       Reason for visit:  Handycap car tag    She eats 2-3 servings of fruits and vegetables daily.She consumes 0 sweetened beverage(s) daily.She exercises with enough effort to increase her heart rate 30 to 60 minutes per day.  She exercises with enough effort to increase her heart rate 7 days per week. She is missing 2 dose(s) of medications per week.     EMR reviewed including:             Complaint, History of Chief Complaint, Corresponding Review of Systems, and Complaint Specific Physical Examination.    #1   Patient wants handicap permit  Permit criteria reviewed with patient in detail  Patient attests that she has severe back pain with ambulation and cannot walk greater than 200 feet without having to stop and rest.        Exam:  Bilateral total knee arthroplasties  History of prior back surgery  Mild discomfort in the left leg reported.      #2   Follow-up on hyperlipidemia  Continues Crestor regularly  Denies any muscle pain or symptoms of side effects from the medication.  Denies any chest pain or peripheral vascular symptoms.        Exam:   HEART:  regular without rubs, clicks, gallops, or murmurs. PMI is nondisplaced. Upstrokes are brisk. S1,S2 are heard.   LUNGS: clear bilaterally, airflow is brisk, no intercostal retraction or stridor is noted. No coughing is noted during visit.   NEURO: Pt is alert and appropriate. No neurologic lateralization is noted. Cranial nerves 2-12 are intact. Peripheral sensory and motor function are grossly normal.       #3   Eczema  Intermittent primarily on the hands  Uses 0.05% desonide with good results  No current rash present.  Patient wants refill.        Exam:   SKIN:  warm and dry. No erythema, or rashes are noted. No specific lesions of concern are noted.         Patient has been interviewed, applicable history and  "applied review of systems have been performed.    Vital Signs:   /80 (BP Location: Right arm)   Pulse 72   Temp 97.9  F (36.6  C) (Temporal)   Resp 18   Ht 1.527 m (5' 0.1\")   Wt 77.9 kg (171 lb 11.2 oz)   SpO2 98%   BMI 33.42 kg/m        Decision Making    Problem and Complexity     1. Chronic left-sided low back pain with left-sided sciatica  Will fill out the form for the handicap permit.  Patient will follow-up with her back care specialist as needed.    2. Hyperlipidemia LDL goal <130  Continue Crestor, recent laboratory work performed and reviewed  - rosuvastatin (CRESTOR) 40 MG tablet; Take 1 tablet (40 mg) by mouth daily  Dispense: 90 tablet; Refill: 3    3. Eczema, unspecified type  Reviewed the medication  - desonide (DESOWEN) 0.05 % external ointment; Apply topically 2 times daily  Dispense: 15 g; Refill: 3    4. Morbid obesity (H)  Recommend weight loss responsible caloric restriction and exercise                                FOLLOW UP   I have asked the patient to make an appointment for followup with either:  1.  Me,  2.  The patient's preferred provider,  3.  Any available provider  In 6 months or as needed.  Patient requested to come in fasting        Regarding routine vaccinations:  I have reviewed the patient's vaccination schedule and discussed the benefits of prophylactic vaccination in detail.  I recommend the patient contact their pharmacist (not the pharmacy within the clinic) for vaccinations.  Discussed that most insurance companies now favor reimbursement to the pharmacies and it will financially behoove the patient to have vaccinations performed at their pharmacy.        I have carefully explained the diagnosis and treatment options to the patient.  The patient has displayed an understanding of the above, and all subsequent questions were answered.      DO ADOLFO Barth    Portions of this note were produced using [employer]  Although every attempt at " real-time proof reading has been made, occasional grammar/syntax errors may have been missed.

## 2023-02-08 ENCOUNTER — TELEPHONE (OUTPATIENT)
Dept: INTERNAL MEDICINE | Facility: CLINIC | Age: 68
End: 2023-02-08

## 2023-02-08 NOTE — TELEPHONE ENCOUNTER
Forms/Letter Request    Type of form/letter: DMV     Have you been seen for this request: Yes    Do we have the form/letter: Yes    When is form/letter needed by: asap    How would you like the form/letter returned:     Patient Notified form requests are processed in 3-5 business days:Yes    Could we send this information to you in [facility] or would you prefer to receive a phone call?:   Patient would prefer a phone call   Okay to leave a detailed message?: Yes at Home number on file [phone] (home)

## 2023-05-15 ENCOUNTER — PATIENT OUTREACH (OUTPATIENT)
Dept: CARE COORDINATION | Facility: CLINIC | Age: 68
End: 2023-05-15
Payer: MEDICARE

## 2023-05-16 ENCOUNTER — HOSPITAL ENCOUNTER (OUTPATIENT)
Dept: MAMMOGRAPHY | Facility: CLINIC | Age: 68
Discharge: HOME | End: 2023-05-16
Attending: INTERNAL MEDICINE | Admitting: INTERNAL MEDICINE
Payer: MEDICARE

## 2023-05-16 DIAGNOSIS — Z12.31 ENCOUNTER FOR SCREENING MAMMOGRAM FOR MALIGNANT NEOPLASM OF BREAST: ICD-10-CM

## 2023-05-16 PROCEDURE — 77067 SCR MAMMO BI INCL CAD: CPT

## 2023-06-16 ASSESSMENT — ENCOUNTER SYMPTOMS: BREAST MASS: 0

## 2023-06-16 ASSESSMENT — ACTIVITIES OF DAILY LIVING (ADL): CURRENT_FUNCTION: NO ASSISTANCE NEEDED

## 2023-06-19 NOTE — TELEPHONE ENCOUNTER
Called patient to let her know we are still waiting for her records from her last RFA. Advised her that per Dr. Wong she may schedule her RFA. Provided scheduling number to patient.     Alison ANDRADEN-RN Care Coordinator  [facility]         show

## 2023-06-23 ENCOUNTER — MYC MEDICAL ADVICE (OUTPATIENT)
Dept: INTERNAL MEDICINE | Facility: CLINIC | Age: 68
End: 2023-06-23

## 2023-06-23 ENCOUNTER — OFFICE VISIT (OUTPATIENT)
Dept: INTERNAL MEDICINE | Facility: CLINIC | Age: 68
End: 2023-06-23
Payer: MEDICARE

## 2023-06-23 VITALS
HEART RATE: 60 BPM | SYSTOLIC BLOOD PRESSURE: 116 MMHG | WEIGHT: 163 LBS | RESPIRATION RATE: 10 BRPM | OXYGEN SATURATION: 98 % | TEMPERATURE: 98.3 F | DIASTOLIC BLOOD PRESSURE: 76 MMHG | HEIGHT: 61 IN | BODY MASS INDEX: 30.78 KG/M2

## 2023-06-23 DIAGNOSIS — E78.5 HYPERLIPIDEMIA LDL GOAL <130: ICD-10-CM

## 2023-06-23 DIAGNOSIS — J30.2 SEASONAL ALLERGIC RHINITIS, UNSPECIFIED TRIGGER: Primary | ICD-10-CM

## 2023-06-23 DIAGNOSIS — E66.01 MORBID OBESITY (H): ICD-10-CM

## 2023-06-23 DIAGNOSIS — J30.2 SEASONAL ALLERGIC RHINITIS, UNSPECIFIED TRIGGER: ICD-10-CM

## 2023-06-23 DIAGNOSIS — Z00.00 MEDICARE ANNUAL WELLNESS VISIT, SUBSEQUENT: Primary | ICD-10-CM

## 2023-06-23 LAB
ALBUMIN SERPL BCG-MCNC: 4.1 G/DL (ref 3.5–5.2)
ALBUMIN UR-MCNC: NEGATIVE MG/DL
ALP SERPL-CCNC: 70 U/L (ref 35–104)
ALT SERPL W P-5'-P-CCNC: 12 U/L (ref 0–50)
ANION GAP SERPL CALCULATED.3IONS-SCNC: 11 MMOL/L (ref 7–15)
APPEARANCE UR: CLEAR
AST SERPL W P-5'-P-CCNC: 13 U/L (ref 0–45)
BILIRUB DIRECT SERPL-MCNC: <0.2 MG/DL (ref 0–0.3)
BILIRUB SERPL-MCNC: 0.3 MG/DL
BILIRUB UR QL STRIP: NEGATIVE
BUN SERPL-MCNC: 10.3 MG/DL (ref 8–23)
CALCIUM SERPL-MCNC: 9.7 MG/DL (ref 8.8–10.2)
CHLORIDE SERPL-SCNC: 106 MMOL/L (ref 98–107)
CHOLEST SERPL-MCNC: 304 MG/DL
COLOR UR AUTO: YELLOW
CREAT SERPL-MCNC: 0.76 MG/DL (ref 0.51–0.95)
DEPRECATED HCO3 PLAS-SCNC: 25 MMOL/L (ref 22–29)
ERYTHROCYTE [DISTWIDTH] IN BLOOD BY AUTOMATED COUNT: 13 % (ref 10–15)
GFR SERPL CREATININE-BSD FRML MDRD: 85 ML/MIN/1.73M2
GLUCOSE SERPL-MCNC: 100 MG/DL (ref 70–99)
GLUCOSE UR STRIP-MCNC: NEGATIVE MG/DL
HCT VFR BLD AUTO: 45.5 % (ref 35–47)
HDLC SERPL-MCNC: 54 MG/DL
HGB BLD-MCNC: 14.8 G/DL (ref 11.7–15.7)
HGB UR QL STRIP: NEGATIVE
KETONES UR STRIP-MCNC: 5 MG/DL
LDLC SERPL CALC-MCNC: 222 MG/DL
LEUKOCYTE ESTERASE UR QL STRIP: NEGATIVE
MCH RBC QN AUTO: 31.5 PG (ref 26.5–33)
MCHC RBC AUTO-ENTMCNC: 32.5 G/DL (ref 31.5–36.5)
MCV RBC AUTO: 97 FL (ref 78–100)
NITRATE UR QL: NEGATIVE
NONHDLC SERPL-MCNC: 250 MG/DL
PH UR STRIP: 5 [PH] (ref 5–7)
PLATELET # BLD AUTO: 262 10E3/UL (ref 150–450)
POTASSIUM SERPL-SCNC: 4.9 MMOL/L (ref 3.4–5.3)
PROT SERPL-MCNC: 6.7 G/DL (ref 6.4–8.3)
RBC # BLD AUTO: 4.7 10E6/UL (ref 3.8–5.2)
SODIUM SERPL-SCNC: 142 MMOL/L (ref 136–145)
SP GR UR STRIP: 1.02 (ref 1–1.03)
TRIGL SERPL-MCNC: 141 MG/DL
UROBILINOGEN UR STRIP-MCNC: 2 MG/DL
WBC # BLD AUTO: 8.6 10E3/UL (ref 4–11)

## 2023-06-23 PROCEDURE — 36415 COLL VENOUS BLD VENIPUNCTURE: CPT | Performed by: INTERNAL MEDICINE

## 2023-06-23 PROCEDURE — 81003 URINALYSIS AUTO W/O SCOPE: CPT | Performed by: INTERNAL MEDICINE

## 2023-06-23 PROCEDURE — 85027 COMPLETE CBC AUTOMATED: CPT | Performed by: INTERNAL MEDICINE

## 2023-06-23 PROCEDURE — 80053 COMPREHEN METABOLIC PANEL: CPT | Performed by: INTERNAL MEDICINE

## 2023-06-23 PROCEDURE — 80061 LIPID PANEL: CPT | Performed by: INTERNAL MEDICINE

## 2023-06-23 PROCEDURE — G0439 PPPS, SUBSEQ VISIT: HCPCS | Performed by: INTERNAL MEDICINE

## 2023-06-23 PROCEDURE — 82248 BILIRUBIN DIRECT: CPT | Performed by: INTERNAL MEDICINE

## 2023-06-23 ASSESSMENT — ENCOUNTER SYMPTOMS: BREAST MASS: 0

## 2023-06-23 ASSESSMENT — ACTIVITIES OF DAILY LIVING (ADL): CURRENT_FUNCTION: NO ASSISTANCE NEEDED

## 2023-06-23 NOTE — PROGRESS NOTES
"SUBJECTIVE:   Lani is a 68 year old who presents for Preventive Visit.      6/23/2023    10:51 AM   Additional Questions   Roomed by Mercedez rivas     Are you in the first 12 months of your Medicare coverage?  No    Healthy Habits:    In general, how would you rate your overall health?  Good    Frequency of exercise:  2-3 days/week    Duration of exercise:  15-30 minutes    Do you usually eat at least 4 servings of fruit and vegetables a day, include whole grains    & fiber and avoid regularly eating high fat or \"junk\" foods?  Yes    Taking medications regularly:  Yes    Barriers to taking medications:  None    Medication side effects:  None    Ability to successfully perform activities of daily living:  No assistance needed    Home Safety:  No safety concerns identified    Hearing Impairment:  No hearing concerns    In the past 6 months, have you been bothered by leaking of urine?  No    In general, how would you rate your overall mental or emotional health?  Excellent      PHQ-2 Total Score:    Additional concerns today:  Yes        Have you ever done Advance Care Planning? (For example, a Health Directive, POLST, or a discussion with a medical provider or your loved ones about your wishes): No, advance care planning information given to patient to review.  Patient plans to discuss their wishes with loved ones or provider.         Fall risk  Fallen 2 or more times in the past year?: No  Any fall with injury in the past year?: No    Cognitive Screening   1) Repeat 3 items (Leader, Season, Table)    2) Clock draw: NORMAL  3) 3 item recall: Recalls 3 objects  Results: 3 items recalled: COGNITIVE IMPAIRMENT LESS LIKELY    Mini-CogTM Copyright KHURRAM Stratton. Licensed by the author for use in [facility]; reprinted with permission (teddy@.[facility]). All rights reserved.      Do you have sleep apnea, excessive snoring or daytime drowsiness?: no    Reviewed and updated as needed this visit by clinical staff   Tobacco   " Meds              Reviewed and updated as needed this visit by Provider                 Social History     Tobacco Use    Smoking status: Every Day     Packs/day: 0.10     Years: 36.00     Pack years: 3.60     Types: Cigars, Cigarettes     Start date: 1981     Last attempt to quit: 2019     Years since quittin.4    Smokeless tobacco: Never   Substance Use Topics    Alcohol use: No     Alcohol/week: 0.0 standard drinks of alcohol             2023    10:14 AM   Alcohol Use   Prescreen: >3 drinks/day or >7 drinks/week? No     Do you have a current opioid prescription? No  Do you use any other controlled substances or medications that are not prescribed by a provider? None          -------------------------------------    Current providers sharing in care for this patient include:   Patient Care Team:  Cipriano Arias DO as PCP - General (Internal Medicine)  Cipriano Arias DO as Assigned PCP    The following health maintenance items are reviewed in Epic and correct as of today:  Health Maintenance   Topic Date Due    COVID-19 Vaccine (1) Never done    Pneumococcal Vaccine: 65+ Years (1 - PCV) Never done    DTAP/TDAP/TD IMMUNIZATION (1 - Tdap) Never done    ZOSTER IMMUNIZATION (1 of 2) Never done    LUNG CANCER SCREENING  Never done    ANNUAL REVIEW OF HM ORDERS  2023    NICOTINE/TOBACCO CESSATION COUNSELING Q 1 YR  2023    MEDICARE ANNUAL WELLNESS VISIT  2023    INFLUENZA VACCINE (Season Ended) 2023    FALL RISK ASSESSMENT  2024    MAMMO SCREENING  2025    COLORECTAL CANCER SCREENING  05/10/2027    LIPID  2027    ADVANCE CARE PLANNING  2027    DEXA  2028    HEPATITIS C SCREENING  Completed    PHQ-2 (once per calendar year)  Completed    IPV IMMUNIZATION  Aged Out    MENINGITIS IMMUNIZATION  Aged Out     Lab work is in process  Labs reviewed in EPIC  BP Readings from Last 3 Encounters:   23 116/76   23 124/80    06/14/22 128/70    Wt Readings from Last 3 Encounters:   06/23/23 73.9 kg (163 lb)   01/31/23 77.9 kg (171 lb 11.2 oz)   06/14/22 86.9 kg (191 lb 8 oz)                  Patient Active Problem List   Diagnosis    Lesion of left ulnar nerve    Gastroesophageal reflux disease without esophagitis    Hyperlipidemia LDL goal <130    Cubital tunnel syndrome, left    Advanced directives, counseling/discussion    Morbid obesity (H)    S/P cubital tunnel release    Numbness and tingling in left hand     Past Surgical History:   Procedure Laterality Date    ABDOMEN SURGERY      Several including 2 c-sections and a back surgery    APPENDECTOMY  5/26/1977    Incidental appendectamy at birth of child.    ARTHROPLASTY KNEE Left 05/2009    BACK SURGERY  1/16/2014    Full fusion L4, L5, S1    BIOPSY      2 occasions...1on chest for spot..2 needle in right breast    COLONOSCOPY  2017 or 2018    Can't remember exact date    GYN SURGERY  Sept 1979    Hysterectomy    INJECT FACET JOINT N/A 7/10/2017    Procedure: INJECT FACET JOINT;  Lumbar 3-4 Bilateral Facet joint injection;  Surgeon: Misha Rae MD;  Location:  OR    JOINT REPLACEMENT      LUMBAR FUSION      RELEASE ULNAR NERVE (ELBOW) Left 8/30/2016    Procedure: RELEASE ULNAR NERVE (ELBOW);  Surgeon: Steve Parks DO;  Location:  OR    REPLACEMENT TOTAL KNEE Right 7/10/2019    Procedure: RIGHT TOTAL KNEE ARTHROPLASTY COMPUTER ASSIST;  Surgeon: Luigi Candelario MD;  Location: [facility];  Service: Orthopedics    TRANSPOSITION ULNAR NERVE (ELBOW) Left 6/19/2018    Procedure: TRANSPOSITION ULNAR NERVE (ELBOW);  Left ulnar nerve decompression with transposition;  Surgeon: Steve Parks DO;  Location:  OR       Social History     Tobacco Use    Smoking status: Every Day     Packs/day: 0.10     Years: 36.00     Pack years: 3.60     Types: Cigars, Cigarettes     Start date: 11/1/1981     Last attempt to quit: 1/5/2019     Years since  quittin.6    Smokeless tobacco: Never   Substance Use Topics    Alcohol use: No     Alcohol/week: 0.0 standard drinks of alcohol     Family History   Problem Relation Age of Onset    Cancer Sister     Cerebrovascular Disease Sister     Diabetes Paternal Uncle          Current Outpatient Medications   Medication Sig Dispense Refill    fluticasone (FLONASE) 50 MCG/ACT nasal spray Spray 1 spray into both nostrils daily 16 g 3    Multiple Vitamins-Minerals (ONCOVITE) TABS Take 1 tablet by mouth daily      loratadine (CLARITIN) 10 MG tablet Take 1 tablet (10 mg) by mouth daily 90 tablet 2     Allergies   Allergen Reactions    Bees     Penicillins     Seasonal Allergies      Mammogram Screening: Mammogram Screening: Recommended mammography every 1-2 years with patient discussion and risk factor consideration        2022     9:02 AM   Breast CA Risk Assessment (FHS-7)   Do you have a family history of breast, colon, or ovarian cancer? No / Unknown         Mammogram Screening: Recommended mammography every 1-2 years with patient discussion and risk factor consideration  Pertinent mammograms are reviewed under the imaging tab.    Review of Systems   Breasts:  Negative for tenderness, breast mass and discharge.   Genitourinary: Negative for pelvic pain, vaginal bleeding and vaginal discharge.     CONSTITUTIONAL: NEGATIVE for fever, chills, change in weight  INTEGUMENTARY/SKIN: NEGATIVE for worrisome rashes, moles or lesions  EYES: NEGATIVE for vision changes or irritation  ENT/MOUTH: NEGATIVE for ear, mouth and throat problems  RESP: NEGATIVE for significant cough or SOB  BREAST: NEGATIVE for masses, tenderness or discharge  CV: NEGATIVE for chest pain, palpitations or peripheral edema  GI: NEGATIVE for nausea, abdominal pain, heartburn, or change in bowel habits  : NEGATIVE for frequency, dysuria, or hematuria  MUSCULOSKELETAL: NEGATIVE for significant arthralgias or myalgia  NEURO: NEGATIVE for weakness,  "dizziness or paresthesias  ENDOCRINE: NEGATIVE for temperature intolerance, skin/hair changes  HEME: NEGATIVE for bleeding problems  PSYCHIATRIC: NEGATIVE for changes in mood or affect    OBJECTIVE:   /76   Pulse 60   Temp 98.3  F (36.8  C)   Resp 10   Ht 1.54 m (5' 0.63\")   Wt 73.9 kg (163 lb)   SpO2 98%   BMI 31.18 kg/m   Estimated body mass index is 33.42 kg/m  as calculated from the following:    Height as of 1/31/23: 1.527 m (5' 0.1\").    Weight as of 1/31/23: 77.9 kg (171 lb 11.2 oz).  Physical Exam  GENERAL APPEARANCE: healthy, alert and no distress  EYES: Eyes grossly normal to inspection, PERRL and conjunctivae and sclerae normal  HENT: ear canals and TM's normal, nose and mouth without ulcers or lesions, oropharynx clear and oral mucous membranes moist  NECK: no adenopathy, no asymmetry, masses, or scars and thyroid normal to palpation  RESP: lungs clear to auscultation - no rales, rhonchi or wheezes  CV: regular rate and rhythm, normal S1 S2, no S3 or S4, no murmur, click or rub, no peripheral edema and peripheral pulses strong  ABDOMEN: soft, nontender, no hepatosplenomegaly, no masses and bowel sounds normal  MS: no musculoskeletal defects are noted and gait is age appropriate without ataxia  SKIN: no suspicious lesions or rashes  NEURO: Normal strength and tone, sensory exam grossly normal, mentation intact and speech normal  PSYCH: mentation appears normal and affect normal/bright    Diagnostic Test Results:  Labs reviewed in Epic  Results for orders placed or performed in visit on 06/23/23   CBC with platelets     Status: Normal   Result Value Ref Range    WBC Count 8.6 4.0 - 11.0 10e3/uL    RBC Count 4.70 3.80 - 5.20 10e6/uL    Hemoglobin 14.8 11.7 - 15.7 g/dL    Hematocrit 45.5 35.0 - 47.0 %    MCV 97 78 - 100 fL    MCH 31.5 26.5 - 33.0 pg    MCHC 32.5 31.5 - 36.5 g/dL    RDW 13.0 10.0 - 15.0 %    Platelet Count 262 150 - 450 10e3/uL   Basic metabolic panel  (Ca, Cl, CO2, Creat, Gluc, " K, Na, BUN)     Status: Abnormal   Result Value Ref Range    Sodium 142 136 - 145 mmol/L    Potassium 4.9 3.4 - 5.3 mmol/L    Chloride 106 98 - 107 mmol/L    Carbon Dioxide (CO2) 25 22 - 29 mmol/L    Anion Gap 11 7 - 15 mmol/L    Urea Nitrogen 10.3 8.0 - 23.0 mg/dL    Creatinine 0.76 0.51 - 0.95 mg/dL    Calcium 9.7 8.8 - 10.2 mg/dL    Glucose 100 (H) 70 - 99 mg/dL    GFR Estimate 85 >60 mL/min/1.73m2   UA Macroscopic with reflex to Microscopic and Culture     Status: Abnormal    Specimen: Urine, NOS   Result Value Ref Range    Color Urine Yellow Colorless, Straw, Light Yellow, Yellow    Appearance Urine Clear Clear    Glucose Urine Negative Negative mg/dL    Bilirubin Urine Negative Negative    Ketones Urine 5 (A) Negative mg/dL    Specific Gravity Urine 1.021 1.003 - 1.035    Blood Urine Negative Negative    pH Urine 5.0 5.0 - 7.0    Protein Albumin Urine Negative Negative mg/dL    Urobilinogen Urine 2.0 Normal, 2.0 mg/dL    Nitrite Urine Negative Negative    Leukocyte Esterase Urine Negative Negative    Narrative    Microscopic not indicated   Hepatic panel (Albumin, ALT, AST, Bili, Alk Phos, TP)     Status: Normal   Result Value Ref Range    Protein Total 6.7 6.4 - 8.3 g/dL    Albumin 4.1 3.5 - 5.2 g/dL    Bilirubin Total 0.3 <=1.2 mg/dL    Alkaline Phosphatase 70 35 - 104 U/L    AST 13 0 - 45 U/L    ALT 12 0 - 50 U/L    Bilirubin Direct <0.20 0.00 - 0.30 mg/dL   Lipid panel reflex to direct LDL Fasting     Status: Abnormal   Result Value Ref Range    Cholesterol 304 (H) <200 mg/dL    Triglycerides 141 <150 mg/dL    Direct Measure HDL 54 >=50 mg/dL    LDL Cholesterol Calculated 222 (H) <=100 mg/dL    Non HDL Cholesterol 250 (H) <130 mg/dL    Narrative    Cholesterol  Desirable:  <200 mg/dL    Triglycerides  Normal:  Less than 150 mg/dL  Borderline High:  150-199 mg/dL  High:  200-499 mg/dL  Very High:  Greater than or equal to 500 mg/dL    Direct Measure HDL  Female:  Greater than or equal to 50 mg/dL   Male:   "Greater than or equal to 40 mg/dL    LDL Cholesterol  Desirable:  <100mg/dL  Above Desirable:  100-129 mg/dL   Borderline High:  130-159 mg/dL   High:  160-189 mg/dL   Very High:  >= 190 mg/dL    Non HDL Cholesterol  Desirable:  130 mg/dL  Above Desirable:  130-159 mg/dL  Borderline High:  160-189 mg/dL  High:  190-219 mg/dL  Very High:  Greater than or equal to 220 mg/dL       ASSESSMENT / PLAN:       ICD-10-CM    1. Medicare annual wellness visit, subsequent  Z00.00 CBC with platelets     UA Macroscopic with reflex to Microscopic and Culture     CBC with platelets     UA Macroscopic with reflex to Microscopic and Culture      2. Hyperlipidemia LDL goal <130  E78.5 Basic metabolic panel  (Ca, Cl, CO2, Creat, Gluc, K, Na, BUN)     Hepatic panel (Albumin, ALT, AST, Bili, Alk Phos, TP)     Lipid panel reflex to direct LDL Fasting     Basic metabolic panel  (Ca, Cl, CO2, Creat, Gluc, K, Na, BUN)     Hepatic panel (Albumin, ALT, AST, Bili, Alk Phos, TP)     Lipid panel reflex to direct LDL Fasting      3. Seasonal allergic rhinitis, unspecified trigger  J30.2       4. Morbid obesity (H)  E66.01           Patient has been advised of split billing requirements and indicates understanding: Yes      COUNSELING:  Reviewed preventive health counseling, as reflected in patient instructions       Regular exercise       Healthy diet/nutrition       Vision screening       Hearing screening       Dental care       Bladder control       Colon cancer screening      BMI:   Estimated body mass index is 33.42 kg/m  as calculated from the following:    Height as of 1/31/23: 1.527 m (5' 0.1\").    Weight as of 1/31/23: 77.9 kg (171 lb 11.2 oz).   Weight management plan: Discussed healthy diet and exercise guidelines      She reports that she has been smoking cigars. She started smoking about 41 years ago. She has a 3.60 pack-year smoking history. She has never used smokeless tobacco.  Nicotine/Tobacco Cessation Plan:   Information " offered: Patient not interested at this time      Appropriate preventive services were discussed with this patient, including applicable screening as appropriate for cardiovascular disease, diabetes, osteopenia/osteoporosis, and glaucoma.  As appropriate for age/gender, discussed screening for colorectal cancer, prostate cancer, breast cancer, and cervical cancer. Checklist reviewing preventive services available has been given to the patient.    Reviewed patients plan of care and provided an AVS. The Basic Care Plan (routine screening as documented in Health Maintenance) for Lani meets the Care Plan requirement. This Care Plan has been established and reviewed with the Patient.          Cipriano Arias [facility]    Identified Health Risks:  I have reviewed Opioid Use Disorder and Substance Use Disorder risk factors and made any needed referrals.

## 2023-06-25 ENCOUNTER — MYC MEDICAL ADVICE (OUTPATIENT)
Dept: INTERNAL MEDICINE | Facility: CLINIC | Age: 68
End: 2023-06-25
Payer: MEDICARE

## 2023-06-26 RX ORDER — LORATADINE 10 MG/1
10 TABLET ORAL DAILY
Qty: 90 TABLET | Refills: 2 | Status: SHIPPED | OUTPATIENT
Start: 2023-06-26 | End: 2024-07-16

## 2023-06-26 NOTE — TELEPHONE ENCOUNTER
Claritin  Routing refill request to provider for review/approval because:  Patient My Chart as follows:  (Provider approval to place medication back on current list, pending orders supplied)  Lani GUERRERO [facility] Smart Plate Messages (supporting Cipriano Arias DO) 3 days ago     Oops..I told Dr Arias I don't take Claritin but I actually do...sorry I thought he was referring to Chrestor.  Could I please get a reversal on that?  If he could renew my prescription through [facility] I would be grateful  Thank you.  Lani    My Chart message sent.  Marlen Belcher RN

## 2023-07-29 ENCOUNTER — HEALTH MAINTENANCE LETTER (OUTPATIENT)
Age: 68
End: 2023-07-29

## 2023-12-13 ENCOUNTER — HOSPITAL ENCOUNTER (EMERGENCY)
Facility: CLINIC | Age: 68
Discharge: HOME | End: 2023-12-13
Attending: PHYSICIAN ASSISTANT | Admitting: PHYSICIAN ASSISTANT
Payer: MEDICARE

## 2023-12-13 ENCOUNTER — APPOINTMENT (OUTPATIENT)
Dept: CT IMAGING | Facility: CLINIC | Age: 68
End: 2023-12-13
Attending: PHYSICIAN ASSISTANT
Payer: MEDICARE

## 2023-12-13 VITALS
TEMPERATURE: 97.5 F | BODY MASS INDEX: 30.6 KG/M2 | OXYGEN SATURATION: 99 % | SYSTOLIC BLOOD PRESSURE: 119 MMHG | RESPIRATION RATE: 16 BRPM | HEART RATE: 67 BPM | WEIGHT: 160 LBS | DIASTOLIC BLOOD PRESSURE: 84 MMHG

## 2023-12-13 DIAGNOSIS — R55 VAGAL REACTION: ICD-10-CM

## 2023-12-13 DIAGNOSIS — R10.31 BILATERAL LOWER ABDOMINAL CRAMPING: ICD-10-CM

## 2023-12-13 DIAGNOSIS — R10.32 BILATERAL LOWER ABDOMINAL CRAMPING: ICD-10-CM

## 2023-12-13 LAB
ALBUMIN SERPL BCG-MCNC: 3.8 G/DL (ref 3.5–5.2)
ALP SERPL-CCNC: 66 U/L (ref 40–150)
ALT SERPL W P-5'-P-CCNC: 12 U/L (ref 0–50)
ANION GAP SERPL CALCULATED.3IONS-SCNC: 12 MMOL/L (ref 7–15)
AST SERPL W P-5'-P-CCNC: 17 U/L (ref 0–45)
BASOPHILS # BLD AUTO: 0.1 10E3/UL (ref 0–0.2)
BASOPHILS NFR BLD AUTO: 1 %
BILIRUB SERPL-MCNC: 0.2 MG/DL
BUN SERPL-MCNC: 8.1 MG/DL (ref 8–23)
CALCIUM SERPL-MCNC: 9.3 MG/DL (ref 8.8–10.2)
CHLORIDE SERPL-SCNC: 105 MMOL/L (ref 98–107)
CREAT SERPL-MCNC: 0.71 MG/DL (ref 0.51–0.95)
CRP SERPL-MCNC: <3 MG/L
DEPRECATED HCO3 PLAS-SCNC: 22 MMOL/L (ref 22–29)
EGFRCR SERPLBLD CKD-EPI 2021: >90 ML/MIN/1.73M2
EOSINOPHIL # BLD AUTO: 0.1 10E3/UL (ref 0–0.7)
EOSINOPHIL NFR BLD AUTO: 1 %
ERYTHROCYTE [DISTWIDTH] IN BLOOD BY AUTOMATED COUNT: 12.7 % (ref 10–15)
GLUCOSE SERPL-MCNC: 104 MG/DL (ref 70–99)
HCT VFR BLD AUTO: 41.3 % (ref 35–47)
HGB BLD-MCNC: 13.7 G/DL (ref 11.7–15.7)
IMM GRANULOCYTES # BLD: 0 10E3/UL
IMM GRANULOCYTES NFR BLD: 0 %
LYMPHOCYTES # BLD AUTO: 2.5 10E3/UL (ref 0.8–5.3)
LYMPHOCYTES NFR BLD AUTO: 26 %
MCH RBC QN AUTO: 31.3 PG (ref 26.5–33)
MCHC RBC AUTO-ENTMCNC: 33.2 G/DL (ref 31.5–36.5)
MCV RBC AUTO: 94 FL (ref 78–100)
MONOCYTES # BLD AUTO: 0.3 10E3/UL (ref 0–1.3)
MONOCYTES NFR BLD AUTO: 3 %
NEUTROPHILS # BLD AUTO: 6.7 10E3/UL (ref 1.6–8.3)
NEUTROPHILS NFR BLD AUTO: 69 %
NRBC # BLD AUTO: 0 10E3/UL
NRBC BLD AUTO-RTO: 0 /100
PLATELET # BLD AUTO: 258 10E3/UL (ref 150–450)
POTASSIUM SERPL-SCNC: 3.7 MMOL/L (ref 3.4–5.3)
PROT SERPL-MCNC: 6.1 G/DL (ref 6.4–8.3)
RBC # BLD AUTO: 4.38 10E6/UL (ref 3.8–5.2)
SODIUM SERPL-SCNC: 139 MMOL/L (ref 135–145)
WBC # BLD AUTO: 9.7 10E3/UL (ref 4–11)

## 2023-12-13 PROCEDURE — 80053 COMPREHEN METABOLIC PANEL: CPT | Performed by: PHYSICIAN ASSISTANT

## 2023-12-13 PROCEDURE — 250N000009 HC RX 250: Performed by: PHYSICIAN ASSISTANT

## 2023-12-13 PROCEDURE — 99285 EMERGENCY DEPT VISIT HI MDM: CPT | Mod: 25

## 2023-12-13 PROCEDURE — 86140 C-REACTIVE PROTEIN: CPT | Performed by: PHYSICIAN ASSISTANT

## 2023-12-13 PROCEDURE — 250N000011 HC RX IP 250 OP 636: Performed by: PHYSICIAN ASSISTANT

## 2023-12-13 PROCEDURE — 85004 AUTOMATED DIFF WBC COUNT: CPT | Performed by: PHYSICIAN ASSISTANT

## 2023-12-13 PROCEDURE — 74177 CT ABD & PELVIS W/CONTRAST: CPT | Mod: MG

## 2023-12-13 PROCEDURE — 36415 COLL VENOUS BLD VENIPUNCTURE: CPT | Performed by: PHYSICIAN ASSISTANT

## 2023-12-13 PROCEDURE — G1010 CDSM STANSON: HCPCS

## 2023-12-13 PROCEDURE — 99284 EMERGENCY DEPT VISIT MOD MDM: CPT | Performed by: PHYSICIAN ASSISTANT

## 2023-12-13 RX ORDER — IOPAMIDOL 755 MG/ML
500 INJECTION, SOLUTION INTRAVASCULAR ONCE
Status: COMPLETED | OUTPATIENT
Start: 2023-12-13 | End: 2023-12-13

## 2023-12-13 RX ADMIN — IOPAMIDOL 80 ML: 755 INJECTION, SOLUTION INTRAVENOUS at 16:14

## 2023-12-13 RX ADMIN — SODIUM CHLORIDE 60 ML: 9 INJECTION, SOLUTION INTRAVENOUS at 16:14

## 2023-12-13 ASSESSMENT — ACTIVITIES OF DAILY LIVING (ADL)
ADLS_ACUITY_SCORE: 35
ADLS_ACUITY_SCORE: 35

## 2023-12-13 NOTE — ED PROVIDER NOTES
History     Chief Complaint   Patient presents with    Abdominal Pain       HPI  Lani Barry is a 68 year old female who presents to the emergency department complaining of abdominal pain. The patient reports an hour prior to arrival she developed a severe cramp in her lower abdomen.  She went to the bathroom thinking she had to have a bowel movement.  When she got there and was sitting, she broke out into a cold sweat and her whole body started tingling.  She developed nausea at that time.  Those symptoms slowly went away on their own and the abdominal pain has also since improved but comes and goes.  She has not had any bowel movement since symptoms started and denies passing gas.  She did have a small BM this morning.  Had 2 cups of coffee today but nothing else to eat or drink.  She has not had any vomiting.  She has not had any fevers.  Denies any chest pain, shortness of breath, or headache with this episode.        Allergies:  Allergies   Allergen Reactions    Bees     Penicillins     Seasonal Allergies        Problem List:    Patient Active Problem List    Diagnosis Date Noted    S/P cubital tunnel release 05/18/2018     Priority: Medium     8/30/2016 left side by Dr. Parks      Numbness and tingling in left hand 05/18/2018     Priority: Medium    Advanced directives, counseling/discussion 10/25/2017     Priority: Medium     Will bring in a copy      Morbid obesity (H) 10/25/2017     Priority: Medium    Cubital tunnel syndrome, left 08/18/2016     Priority: Medium    Lesion of left ulnar nerve 08/16/2016     Priority: Medium    Gastroesophageal reflux disease without esophagitis 08/16/2016     Priority: Medium    Hyperlipidemia LDL goal <130 08/16/2016     Priority: Medium        Past Medical History:    Past Medical History:   Diagnosis Date    Arthritis     Cerebral infarction (H)     Morbid obesity (H) 10/25/2017       Past Surgical History:    Past Surgical History:   Procedure Laterality Date     ABDOMEN SURGERY      Several including 2 c-sections and a back surgery    APPENDECTOMY  1977    Incidental appendectamy at birth of child.    ARTHROPLASTY KNEE Left 2009    BACK SURGERY  2014    Full fusion L4, L5, S1    BIOPSY      2 occasions...1on chest for spot..2 needle in right breast    COLONOSCOPY   or     Can't remember exact date    GYN SURGERY  1979    Hysterectomy    INJECT FACET JOINT N/A 7/10/2017    Procedure: INJECT FACET JOINT;  Lumbar 3-4 Bilateral Facet joint injection;  Surgeon: Misha Rae MD;  Location: [facility] OR    JOINT REPLACEMENT      LUMBAR FUSION      RELEASE ULNAR NERVE (ELBOW) Left 2016    Procedure: RELEASE ULNAR NERVE (ELBOW);  Surgeon: Steve Parks DO;  Location: [facility] OR    REPLACEMENT TOTAL KNEE Right 7/10/2019    Procedure: RIGHT TOTAL KNEE ARTHROPLASTY COMPUTER ASSIST;  Surgeon: Luigi Candelario MD;  Location: [facility] OR;  Service: Orthopedics    TRANSPOSITION ULNAR NERVE (ELBOW) Left 2018    Procedure: TRANSPOSITION ULNAR NERVE (ELBOW);  Left ulnar nerve decompression with transposition;  Surgeon: Steve Parks DO;  Location: [facility] OR       Family History:    Family History   Problem Relation Age of Onset    Cancer Sister     Cerebrovascular Disease Sister     Diabetes Paternal Uncle        Social History:  Marital Status:   [2]  Social History     Tobacco Use    Smoking status: Every Day     Packs/day: 0.10     Years: 36.00     Additional pack years: 0.00     Total pack years: 3.60     Types: Cigars, Cigarettes     Start date: 1981     Last attempt to quit: 2019     Years since quittin.9    Smokeless tobacco: Never   Vaping Use    Vaping Use: Never used   Substance Use Topics    Alcohol use: No     Alcohol/week: 0.0 standard drinks of alcohol    Drug use: No        Medications:    fluticasone (FLONASE) 50 MCG/ACT nasal spray  loratadine (CLARITIN) 10 MG tablet  Multiple Vitamins-Minerals  (ONCOVITE) TABS          Review of Systems   All other systems reviewed and are negative.        Physical Exam   BP: 119/84  Pulse: 67  Temp: 97.5  F (36.4  C)  Resp: 16  Weight: 72.6 kg (160 lb)  SpO2: 99 %      Physical Exam  Vitals and nursing note reviewed.   Constitutional:       General: She is not in acute distress.     Appearance: Normal appearance. She is well-developed. She is not ill-appearing, toxic-appearing or diaphoretic.   HENT:      Head: Normocephalic and atraumatic.   Eyes:      Conjunctiva/sclera: Conjunctivae normal.      Pupils: Pupils are equal, round, and reactive to light.   Cardiovascular:      Rate and Rhythm: Normal rate and regular rhythm.      Heart sounds: Normal heart sounds.   Pulmonary:      Effort: Pulmonary effort is normal. No respiratory distress.      Breath sounds: Normal breath sounds.   Abdominal:      General: Bowel sounds are normal. There is no distension.      Palpations: Abdomen is soft.      Tenderness: There is abdominal tenderness (through lower abdomen). There is no right CVA tenderness or left CVA tenderness.   Musculoskeletal:         General: No deformity.      Cervical back: Neck supple.   Skin:     General: Skin is warm and dry.   Neurological:      General: No focal deficit present.      Mental Status: She is alert and oriented to person, place, and time. Mental status is at baseline.      Coordination: Coordination normal.   Psychiatric:         Mood and Affect: Mood normal.           ED Course           Procedures      Results for orders placed or performed during the hospital encounter of 12/13/23 (from the past 24 hour(s))   CBC with platelets differential    Narrative    The following orders were created for panel order CBC with platelets differential.  Procedure                               Abnormality         Status                     ---------                               -----------         ------                     CBC with platelets and  gallo.[[id number]]                      Final result                 Please view results for these tests on the individual orders.   Comprehensive metabolic panel   Result Value Ref Range    Sodium 139 135 - 145 mmol/L    Potassium 3.7 3.4 - 5.3 mmol/L    Carbon Dioxide (CO2) 22 22 - 29 mmol/L    Anion Gap 12 7 - 15 mmol/L    Urea Nitrogen 8.1 8.0 - 23.0 mg/dL    Creatinine 0.71 0.51 - 0.95 mg/dL    GFR Estimate >90 >60 mL/min/1.73m2    Calcium 9.3 8.8 - 10.2 mg/dL    Chloride 105 98 - 107 mmol/L    Glucose 104 (H) 70 - 99 mg/dL    Alkaline Phosphatase 66 40 - 150 U/L    AST 17 0 - 45 U/L    ALT 12 0 - 50 U/L    Protein Total 6.1 (L) 6.4 - 8.3 g/dL    Albumin 3.8 3.5 - 5.2 g/dL    Bilirubin Total 0.2 <=1.2 mg/dL   CRP inflammation   Result Value Ref Range    CRP Inflammation <3.00 <5.00 mg/L   CBC with platelets and differential   Result Value Ref Range    WBC Count 9.7 4.0 - 11.0 10e3/uL    RBC Count 4.38 3.80 - 5.20 10e6/uL    Hemoglobin 13.7 11.7 - 15.7 g/dL    Hematocrit 41.3 35.0 - 47.0 %    MCV 94 78 - 100 fL    MCH 31.3 26.5 - 33.0 pg    MCHC 33.2 31.5 - 36.5 g/dL    RDW 12.7 10.0 - 15.0 %    Platelet Count 258 150 - 450 10e3/uL    % Neutrophils 69 %    % Lymphocytes 26 %    % Monocytes 3 %    % Eosinophils 1 %    % Basophils 1 %    % Immature Granulocytes 0 %    NRBCs per 100 WBC 0 <1 /100    Absolute Neutrophils 6.7 1.6 - 8.3 10e3/uL    Absolute Lymphocytes 2.5 0.8 - 5.3 10e3/uL    Absolute Monocytes 0.3 0.0 - 1.3 10e3/uL    Absolute Eosinophils 0.1 0.0 - 0.7 10e3/uL    Absolute Basophils 0.1 0.0 - 0.2 10e3/uL    Absolute Immature Granulocytes 0.0 <=0.4 10e3/uL    Absolute NRBCs 0.0 10e3/uL   CT Abdomen Pelvis w Contrast    Narrative    CT ABDOMEN PELVIS W CONTRAST 12/13/2023 4:28 PM    CLINICAL HISTORY: lower abd pain    TECHNIQUE: CT scan of the abdomen and pelvis was performed following  injection of IV contrast. Multiplanar reformats were obtained. Dose  reduction techniques were used.  CONTRAST: Isovue  370, 80mL    COMPARISON: None.    FINDINGS:   LOWER CHEST: Incidental juxtapleural 3 mm left lower lobe nodule  (series 3 image 34). No focal airspace consolidation or pleural  effusion. Calcified granulomas also noted. Likely small hiatal hernia.    HEPATOBILIARY: Multiple subcentimeter likely hepatic cysts, no  specific follow-up recommended. No evidence of biliary obstruction.    PANCREAS: Normal.    SPLEEN: Normal.    ADRENAL GLANDS: Normal.    KIDNEYS/BLADDER: No hydronephrosis. Urinary bladder is unremarkable.    BOWEL: Diverticulosis in the colon. No acute inflammatory change. No  obstruction.     PELVIC ORGANS: Hysterectomy.    ADDITIONAL FINDINGS: Moderate calcified atherosclerosis. No suspicious  lymphadenopathy. No significant ascites.    MUSCULOSKELETAL: No acute bony abnormality. Anterior and posterior  fusion hardware noted in the lumbar spine.      Impression    IMPRESSION:   1.  No acute abnormality in the abdomen or pelvis.  2.  Incidental 3 mm left lower lobe pulmonary nodule, consider  follow-up described below.    REFERENCE:  Guidelines for Management of Incidental Pulmonary Nodules Detected on  CT Images: From the Fleischner Society 2017.   Guidelines apply to incidental nodules in patients who are 35 years or  older.  Guidelines do not apply to lung cancer screening, patients with  immunosuppression, or patients with known primary cancer.    SINGLE NODULE  Nodule size <6 mm  Low-risk patients: No follow-up needed.  High-risk patients: Optional follow-up at 12 months.    BETTY OCHOA MD         SYSTEM ID:  [id number]       Medications   iopamidol (ISOVUE-370) solution 500 mL (80 mLs Intravenous $Given 12/13/23 1614)   sodium chloride 0.9 % bag 100mL for CT scan flush use (60 mLs Intravenous $Given 12/13/23 1614)         Assessments & Plan (with Medical Decision Making)  Lani Barry is a 68 year old female who presented to the ED after experiencing severe lower abdominal pain with subsequent  "episode of diaphoresis, nausea, and diffuse body tingling.  No syncopal episode, no associated headache or blurred vision.  No chest pain or shortness of breath.  By the time she was evaluated she did report feeling improved, still with lower abdominal discomfort however.  Vital signs on arrival were within normal limits.  She did have tenderness throughout her lower abdomen without rebound or guarding.  Based on her symptomology, I suspect she likely had a vagal episode, likely in response to pain.  Given her tenderness on exam and description of severe abdominal pain, will complete workup to rule out intra-abdominal pathology such as diverticulitis contributing to pain.  IV was established and labs drawn. Patient declined anything for pain.  Her white blood cell count today was normal and CRP negative.  No significant electrolyte abnormalities or LFT changes.  Urinalysis without signs of infection.  A CT of her abdomen pelvis was fortunately negative for acute pathology to explain her pain.  I went over these results with the patient and her .  She was very reassured by workup for abdominal pain.  Question if she could have had some colonic camping or gas causing severe pain.  I do think that she had a vagal response given her description of diaphoresis and \"tingling.\"  Low index suspicion for ACS since she had no associated chest pain or shortness of breath.  Patient was feeling improved and comfortable with plan to discharge home.  Encouraged her to monitor symptoms as indicated.  If she does have any worsening concerns can return to the ED.  All questions answered and patient discharged home in suitable condition.     I have reviewed the nursing notes.    I have reviewed the findings, diagnosis, plan and need for follow up with the patient.    Discharge Medication List as of 12/13/2023  5:23 PM          Final diagnoses:   Bilateral lower abdominal cramping   Vagal reaction     Note: Chart documentation " done in part with Dragon Voice Recognition software. Although reviewed after completion, some word and grammatical errors may remain.     12/13/2023   [facility] EMERGENCY DEPT       Juliana Beltran PA-C  12/13/23 1951

## 2023-12-13 NOTE — ED TRIAGE NOTES
Pt presents with severe abdominal pain. Pt states started approx 1 hour ago. Nausea. Pt hyperventilating. Intermittent pain .     Triage Assessment (Adult)       Row Name 12/13/23 1508          Triage Assessment    Airway WDL WDL        Skin Circulation/Temperature WDL    Skin Circulation/Temperature WDL WDL        Cardiac WDL    Cardiac WDL WDL        Peripheral/Neurovascular WDL    Peripheral Neurovascular WDL WDL        Cognitive/Neuro/Behavioral WDL    Cognitive/Neuro/Behavioral WDL WDL

## 2023-12-13 NOTE — DISCHARGE INSTRUCTIONS
Your workup today was very reassuring.  I suspect you had some colon cramping causing your abdominal pain which led to a vagal reaction.  I anticipate your symptoms to be very transient but if you do have recurring concerns or any worsening symptoms please do not hesitate to return to the emergency department.    Thank you for choosing [facility]'s Emergency Department. It was a pleasure taking care of you today. If you have any questions, please call [phone].    Juliana Beltran PA-C

## 2024-05-02 ENCOUNTER — PATIENT OUTREACH (OUTPATIENT)
Dept: GASTROENTEROLOGY | Facility: CLINIC | Age: 69
End: 2024-05-02
Payer: MEDICARE

## 2024-05-02 DIAGNOSIS — Z12.11 SPECIAL SCREENING FOR MALIGNANT NEOPLASMS, COLON: Primary | ICD-10-CM

## 2024-05-02 NOTE — PROGRESS NOTES
"CRC Screening Colonoscopy Referral Review    Patient meets the inclusion criteria for screening colonoscopy standing order.    Ordering/Referring Provider:  Cipriano Arias      BMI: Estimated body mass index is 30.6 kg/m  as calculated from the following:    Height as of 6/23/23: 1.54 m (5' 0.63\").    Weight as of 12/13/23: 72.6 kg (160 lb).     Sedation:  Does patient have any of the following conditions affecting sedation?  No medical conditions affecting sedation.    Previous Scopes:  Any previous recommendations or follow up needs based on previous scope?  na / No recommendations.    Medical Concerns to Postpone Order:  Does patient have any of the following medical concerns that should postpone/delay colonoscopy referral?  No medical conditions affecting colonoscopy referral.    Final Referral Details:  Based on patient's medical history patient is appropriate for referral order with moderate sedation. If patient's BMI > 50 do not schedule in ASC.  "

## 2024-05-03 ENCOUNTER — TELEPHONE (OUTPATIENT)
Dept: GASTROENTEROLOGY | Facility: CLINIC | Age: 69
End: 2024-05-03
Payer: MEDICARE

## 2024-05-03 NOTE — LETTER
May 21, 2024      Lani BARKSDALE Jhonny  [address]  [facility] [address]                Standard MiraLAX Bowel Prep  Prep Instructions for your Colonoscopy  Pre-Assessment Phone Number: [facility]; [phone]    Please read these instructions carefully at least 7 days prior to your colonoscopy procedure. Be sure to follow all directions completely. The inside of your colon must be clean to allow for a complete examination for the presence of any growths, polyps, and/or abnormalities, as well as their biopsy or removal. A number of tips are included in order to make this part of the procedure as comfortable as possible.    Getting ready:   A nurse will call you to go over instructions and your health history.  It's important to complete the nurse assessment before your procedure. If you don't, your procedure might have to be canceled.  You must arrange for an adult to drive you home after your exam. Your colonoscopy cannot be done unless you have a ride. If you need to use public transportation, someone must ride with you and stay with you for a minimum of 24 hours.  Check with your insurance company to be sure they will cover this exam.  Go to the drug store and buy:  Four (4) - Dulcolax (Bisacodyl) 5mg tablets   8.3 ounce bottle of Miralax powder  64 ounces of Gatorade (not red or purple)     10 ounce bottle of clear magnesium citrate    7 days before the exam:  Talk to your prescribing provider: If you take blood thinners (such as Coumadin, Plavix, Xarelto, Eliquis, Lovenox, or others), these medications may need to be stopped temporarily before your procedure. Your prescribing provider will tell you what to do.   Talk to your prescribing provider: If you take prescription NSAIDS (such as Sulindac, Celebrex, Mobic, Relafen). Your prescribing provider will tell you what to do.   Stop taking fiber and iron supplements   Stop eating corn, popcorn, nuts and foods that contain seeds. These can stay in the colon  for many days and they can clog up the colonoscope.     3 days before the exam:  Begin a low-fiber diet (see below).   Drink at least 4 to 6 large glasses of water or sports drink (not red or purple) each day.     One day before the exam:  Only clear liquid diet is allowed (see below). No solid food should be eaten.   Drink at least 8 to 10 full glasses of clear liquids during the day.   Stop taking NSAID pain relievers, such as Advil, Ibuprofen, Motrin, etc.  You may take Tylenol.  Note: You will start drinking the colonoscopy prep solution at 5 PM. The timing of second step depends on your appointment arrival time. See Steps 1-2 below.    Step One:  At 4 PM, take 2 Dulcolax (Bisacodyl) tablets.   At 4 PM, mix the entire bottle of Miralax with 64 ounces of Gatorade in a pitcher and stir to dissolve the powder. Chill if desired, but do not add ice.   At 5 PM, start drinking an 8-ounce glass of the Miralax and Gatorade mixture every 15 minutes until the pitcher is HALF empty (about 4 glasses).  Store the rest in the refrigerator.   Bowel movements usually begin about one hour after your finish this first dose of Miralax. The stool is likely to be brown at this stage.   After you start drinking the solution, stay near a toilet. You may have watery stools (diarrhea), mild cramping, bloating , and nausea.   You may want to use Vaseline on the skin around your anus after each bowel movement to prevent irritation. You can also use wet wipes to prevent irritation.  Continue to drink clear liquids.     At 10 PM, take 2 Dulcolax (Bisacodyl) tablets  At 10 PM start drinking an 8-ounce glass of Miralax and Gatorade mixture every 15 minutes until the pitcher is empty (about 4 glasses).       Step Two:   If you arrive for your procedure before 11 AM:    6 hours prior to your scheduled arrival to the endoscopy unit drink 10 ounces of clear Magnesium Citrate    If you arrive for your procedure after 11 AM:     At 6 AM on the day  of the exam: drink 10 ounces of clear Magnesium Citrate        Day of exam:  You may drink clear liquids only up until 2 hours before your arrival time.  You may take your necessary morning medications with sips of water  Do not take diabetes medicine by mouth until after your exam.  Do not smoke or swallow anything, including water or gum for at least 2 hours before your arrival time. This is a safety issue. Your procedure could be cancelled if you do not follow directions.  No chewing tobacco 6 hours prior to procedure arrival time.   Please do not wear jewelry (i.e. earrings, rings, necklaces, watches, etc) . Leave your purse, billfold, credit cards, and other valuables at home.    Please arrive with a responsible adult who can take you home after the test and stay with you for a minimum of 24 hours: The medicine used will make you sleepy and forgetful. If you do not have someone to take you home, we will cancel your procedure. If using public transportation you must have someone to ride with you.  Please perform your nebulizer treatments and airway clearance therapy in the morning prior to the procedure (if applicable).  If you have asthma, bring your inhalers.       CLEAR LIQUID DIET   You may have:    Water, tea, coffee (no milk or cream)  Soda pop, Gatorade (not red or purple)  Jell-O, Popsicles (no milk or fruit pieces - not red or purple)  Fat-free soup broth or bouillon  Plain hard candy, such as clear life savers (not red or purple)  Clear juices and fruit-flavored drinks, such as apple juice, white grape juice, Hi-C, and Kiel-Aid (not red or purple)   Do not have:    Milk or milk products such as ice cream, malts or shakes, or coffee creamer  Red or purple drinks of any kind such as cranberry juice or grape juice. Avoid red or purple Jell-O, Popsicles, Kiel-Aid, sorbet, sherbet and candy  Juices with pulp such as orange, grapefruit, pineapple or tomato juice  Cream soups of any kind  Alcohol and  beer  Protein drinks or protein powder     LOW FIBER DIET   You may have:    Starches: White bread, rolls, biscuits, croissants, Jihan toast, white flour tortillas, waffles, pancakes, Korean toast; white rice, noodles, pasta, macaroni; cooked and peeled potatoes; plain crackers, saltines; cooked farina or cream of rice; puffed rice, corn flakes, Rice Krispies, Special K   Vegetables: tender cooked and canned, vegetable broths  Fruits and fruit juices: Strained fruit juice, canned fruit without seeds or skin (not pineapple), applesauce, pear sauce, ripe bananas, melons (not watermelon)   Milk products: Milk (plain or flavored), cheese, cottage cheese, yogurt (no berries), custard, ice cream    Proteins: Tender, well-cooked ground beef, lamb, veal, ham, pork, chicken, turkey, fish or organ meats; eggs; creamy peanut butter   Fats and condiments:  Margarine, butter, oils, mayonnaise, sour cream, salad dressing, plain gravy; spices, cooked herbs; sugar, clear jelly, honey, syrup   Snacks, sweets and drinks: Pretzels, hard candy; plain cakes and cookies (no nuts or seeds); gelatin, plain pudding, sherbet, Popsicles; coffee, tea, carbonated ( fizzy ) drinks Do not have:    Starches: Breads or rolls that contain nuts, seeds or fruit; whole wheat or whole grain breads that contain more than 1 gram of fiber per slice; cornbread; corn or whole wheat tortillas; potatoes with skin; brown rice, wild rice, kasha (buckwheat), and oatmeal   Vegetables: Any raw or steamed vegetables; vegetables with seeds; corn in any form   Fruits and fruit juices: Prunes, prune juice, raisins and other dried fruits, berries and other fruits with seeds, canned pineapple juices with pulp such as orange, grapefruit, pineapple or tomato juice  Milk products: Any yogurt with nuts, seeds or berries   Proteins: Tough, fibrous meats with gristle; cooked dried beans, peas or lentils; crunchy peanut butter  Fats and condiments: Pickles, olives, relish,  horseradish; jam, marmalade, preserves   Snacks, sweets and drinks: Popcorn, nuts, seeds, granola, coconut, candies made with nuts or seeds; all desserts that contain nuts, seeds, raisins and other dried fruits, coconut, whole grains or bran.      FAQ:    Why should Miralax be mixed with Gatorade or another clear sports drink?   It is important that your body does not develop an imbalance of electrolytes with the large volume of fluid in this prep. Gatorade/sports drinks contains those electrolytes.   Does Miralax have to be mixed with Gatorade?  Gatorade, Powerade, or any of the other sports drinks are acceptable. If you are diabetic, it is acceptable to use the low sugar options, such as Gatorade Zero, Powerade Zero, G2, etc.  Can I put ice in the Gatorade/Miralax mixture?  We prefer that you mix it and put it in the refrigerator to chill instead of using ice. The ice will melt and dilute the laxative.    Why should the Miralax prep be taken in several steps?   The stool is flushed out by a large wave of fluid going through the colon. Just sipping a large volume of the solution will not achieve the desired result. Studies have shown that two smaller waves (or more in some cases) are better than one large one.    Why are the second Miralax dose and Magnesium Citrate so close to the exam?   The intestine continues to produce mucus and waste. Longer intervals between the prep and the exam can lead to less than desired results. However, the stomach must be empty at the time of the exam in order to allow safe sedation. Therefore, there should be nothing by mouth 2 hours before the exam is started.   How do you know if your colon is cleaned out?   After completing the bowel prep, your bowel movements should be all liquid and yellow. Your bowel movements will look similar to urine in the toilet. If there are pieces of stool (poop) in the toilet, or if you can't see to the bottom of the toilet, please call our office for  advice. Call [phone] and ask to speak with a nurse.   Why do you need a responsible  to take you home and stay with you?  We require a responsible adult to take you home for your safety. The sedation medicines used to relax you during the procedure can impair your judgement and reaction time, and make you forgetful and possibly a little unsteady. Do not drive, make any important decisions, or sign any legal documents for 24 hours after your procedure.   It is normal to feel bloated and gassy after your procedure. Walking will help move the air through your colon. You can take non-aspirin pain relievers that contain acetaminophen (Tylenol).     When can you eat after your procedure?  You may resume your normal diet when you feel ready, unless advised otherwise by the doctor performing your procedure. Do not drink alcohol for 24 hours after your procedure.   You many resume normal activities (work, exercise, etc.) after 24 hours.     When will you get test results?  You should have your procedure results and any lab results (if applicable) by letter, MyChart message, or phone call within 2 weeks. If you have any questions, please call the doctor that referred you for the procedure.         Updated: 06/22/2022

## 2024-05-03 NOTE — TELEPHONE ENCOUNTER
"Endoscopy Scheduling Screen    Have you had a positive Covid test in the last 14 days?  No    What is your communication preference for Instructions and/or Bowel Prep?   Mail/USPS    What insurance is in the chart?  Other:  MEDICARE    Ordering/Referring Provider: TANYA FITZGERALD   (If ordering provider performs procedure, schedule with ordering provider unless otherwise instructed. )    BMI: Estimated body mass index is 30.6 kg/m  as calculated from the following:    Height as of 6/23/23: 1.54 m (5' 0.63\").    Weight as of 12/13/23: 72.6 kg (160 lb).     Sedation Ordered  moderate sedation.   If patient BMI > 50 do not schedule in [facility].    If patient BMI > 45 do not schedule at [facility].    Are you taking methadone or Suboxone?  No    Have you had difficulties, pain, or discomfort during past endoscopy procedures?  No    Are you taking any prescription medications for pain 3 or more times per week?   NO, No RN review required.    Do you have a history of malignant hyperthermia?  No    (Females) Are you currently pregnant?   No     Have you been diagnosed or told you have pulmonary hypertension?   No    Do you have an LVAD?  No    Have you been told you have moderate to severe sleep apnea?  No    Have you been told you have COPD, asthma, or any other lung disease?  No    Do you have any heart conditions?  No     Have you ever had or are you waiting for an organ transplant?  No. Continue scheduling, no site restrictions.    Have you had a stroke or transient ischemic attack (TIA aka \"mini stroke\" in the last 6 months?   No    Have you been diagnosed with or been told you have cirrhosis of the liver?   No    Are you currently on dialysis?   No    Do you need assistance transferring?   No    BMI: Estimated body mass index is 30.6 kg/m  as calculated from the following:    Height as of 6/23/23: 1.54 m (5' 0.63\").    Weight as of 12/13/23: 72.6 kg (160 lb).     Is patients BMI > 40 and scheduling location [facility]?  No    Do " you take an injectable medication for weight loss or diabetes (excluding insulin)?  No    Do you take the medication Naltrexone?  No    Do you take blood thinners?  No       Prep   Are you currently on dialysis or do you have chronic kidney disease?  No    Do you have a diagnosis of diabetes?  No    Do you have a diagnosis of cystic fibrosis (CF)?  No    On a regular basis do you go 3 -5 days between bowel movements?  No    BMI > 40?  No    Preferred Pharmacy:    [facility] [address] [place] [address]  Phone: [phone] Fax: [phone]      Final Scheduling Details     Procedure scheduled  Colonoscopy    Surgeon:  STONE     Date of procedure:  6/18/24     Pre-OP / PAC:   No - Not required for this site.    Location  [facility] - Per order.    Sedation   MAC/Deep Sedation  Per location.      Patient Reminders:   You will receive a call from a Nurse to review instructions and health history.  This assessment must be completed prior to your procedure.  Failure to complete the Nurse assessment may result in the procedure being cancelled.      On the day of your procedure, please designate an adult(s) who can drive you home stay with you for the next 24 hours. The medicines used in the exam will make you sleepy. You will not be able to drive.      You cannot take public transportation, ride share services, or non-medical taxi service without a responsible caregiver.  Medical transport services are allowed with the requirement that a responsible caregiver will receive you at your destination.  We require that drivers and caregivers are confirmed prior to your procedure.

## 2024-05-17 ENCOUNTER — HOSPITAL ENCOUNTER (OUTPATIENT)
Dept: MAMMOGRAPHY | Facility: CLINIC | Age: 69
Discharge: HOME | End: 2024-05-17
Attending: INTERNAL MEDICINE | Admitting: INTERNAL MEDICINE
Payer: MEDICARE

## 2024-05-17 DIAGNOSIS — Z12.31 VISIT FOR SCREENING MAMMOGRAM: ICD-10-CM

## 2024-05-17 PROCEDURE — 77063 BREAST TOMOSYNTHESIS BI: CPT

## 2024-05-24 ENCOUNTER — PATIENT OUTREACH (OUTPATIENT)
Dept: CARE COORDINATION | Facility: CLINIC | Age: 69
End: 2024-05-24
Payer: MEDICARE

## 2024-06-07 ENCOUNTER — PATIENT OUTREACH (OUTPATIENT)
Dept: CARE COORDINATION | Facility: CLINIC | Age: 69
End: 2024-06-07
Payer: MEDICARE

## 2024-06-17 ENCOUNTER — ANESTHESIA EVENT (OUTPATIENT)
Dept: GASTROENTEROLOGY | Facility: CLINIC | Age: 69
End: 2024-06-17
Payer: MEDICARE

## 2024-06-17 PROBLEM — Z71.89 ADVANCED DIRECTIVES, COUNSELING/DISCUSSION: Status: RESOLVED | Noted: 2017-10-25 | Resolved: 2024-06-17

## 2024-06-17 ASSESSMENT — LIFESTYLE VARIABLES: TOBACCO_USE: 1

## 2024-06-17 NOTE — H&P
[facility] History and Physical    Lani Barry MRN# [id number]   Age: 69 year old YOB: 1955     Date of Admission:  (Not on file)    Home clinic: [facility]  Primary care provider: Cipriano Arias          Impression and Plan:   Impression:   Special screening for malignant neoplasms, colon [Z12.11]  History of polyps  Last colonoscopy 2017-multiple polyps and diverticulosis      Plan:   Proceed to Colonoscopy as planned.  The procedure, risks(bleeding, perforation), benefits and alternatives were discussed and the patient agrees to proceed. Cleared for Anesthesia             Chief Complaint:   Special screening for malignant neoplasms, colon [Z12.11]    History is obtained from the patient          History of Present Illness:   This 69 year old female is being seen at this time for evaluation for colonoscopy.  No complaint.  Family hx unkown           Past Medical History:     Past Medical History:   Diagnosis Date    Arthritis     Cerebral infarction (H)     Morbid obesity (H) 10/25/2017            Past Surgical History:     Past Surgical History:   Procedure Laterality Date    ABDOMEN SURGERY      Several including 2 c-sections and a back surgery    APPENDECTOMY  5/26/1977    Incidental appendectamy at birth of child.    ARTHROPLASTY KNEE Left 05/2009    BACK SURGERY  1/16/2014    Full fusion L4, L5, S1    BIOPSY      2 occasions...1on chest for spot..2 needle in right breast    COLONOSCOPY  2017 or 2018    Can't remember exact date    GYN SURGERY  Sept 1979    Hysterectomy    INJECT FACET JOINT N/A 7/10/2017    Procedure: INJECT FACET JOINT;  Lumbar 3-4 Bilateral Facet joint injection;  Surgeon: Misha Rae MD;  Location: [facility] OR    JOINT REPLACEMENT      LUMBAR FUSION      RELEASE ULNAR NERVE (ELBOW) Left 8/30/2016    Procedure: RELEASE ULNAR NERVE (ELBOW);  Surgeon: Steve Parks DO;  Location: [facility] OR    REPLACEMENT TOTAL KNEE  Right 7/10/2019    Procedure: RIGHT TOTAL KNEE ARTHROPLASTY COMPUTER ASSIST;  Surgeon: Luigi Candelario MD;  Location: [facility];  Service: Orthopedics    TRANSPOSITION ULNAR NERVE (ELBOW) Left 2018    Procedure: TRANSPOSITION ULNAR NERVE (ELBOW);  Left ulnar nerve decompression with transposition;  Surgeon: Steve Parks DO;  Location: [facility]            Social History:     Social History     Tobacco Use    Smoking status: Every Day     Current packs/day: 0.00     Average packs/day: 0.1 packs/day for 37.2 years (3.7 ttl pk-yrs)     Types: Cigars, Cigarettes     Start date: 1981     Last attempt to quit: 2019     Years since quittin.4    Smokeless tobacco: Never   Substance Use Topics    Alcohol use: No     Alcohol/week: 0.0 standard drinks of alcohol            Family History:     Family History   Problem Relation Age of Onset    Cancer Sister     Cerebrovascular Disease Sister     Diabetes Paternal Uncle             Immunizations:     VACCINE/DOSE   Diptheria   DPT   DTAP   HBIG   Hepatitis A   Hepatitis B   HIB   Influenza   Measles   Meningococcal   MMR   Mumps   Pneumococcal   Polio   Rubella   Small Pox   TDAP   Varicella   Zoster            Allergies:     Allergies   Allergen Reactions    Bees     Penicillins     Seasonal Allergies             Medications:     No current facility-administered medications for this encounter.     Current Outpatient Medications   Medication Sig Dispense Refill    fluticasone (FLONASE) 50 MCG/ACT nasal spray Spray 1 spray into both nostrils daily 16 g 3    loratadine (CLARITIN) 10 MG tablet Take 1 tablet (10 mg) by mouth daily 90 tablet 2    Multiple Vitamins-Minerals (ONCOVITE) TABS Take 1 tablet by mouth daily               Review of Systems:   The review of systems was positive for the following findings.  None.  The remainder of the review of systems was unremarkable.          Physical Exam:   All vitals have been reviewed  not currently  breastfeeding.  No intake or output data in the 24 hours ending 06/17/24 1352  SHEENT examination revealed NC/aT, EOMI.  Examination of the chest revealed CTA.  Examination of the heart revealed RRR.  Examination of the abdomen revealed soft, non tender.  The neuromuscular examination was NL.          Data:   All laboratory data reviewed  No results found for any visits on 06/18/24.  -     Misha Russo MD, FACS

## 2024-06-18 ENCOUNTER — ANESTHESIA (OUTPATIENT)
Dept: GASTROENTEROLOGY | Facility: CLINIC | Age: 69
End: 2024-06-18
Payer: MEDICARE

## 2024-06-18 ENCOUNTER — HOSPITAL ENCOUNTER (OUTPATIENT)
Facility: CLINIC | Age: 69
Discharge: HOME | End: 2024-06-18
Attending: SPECIALIST | Admitting: SPECIALIST
Payer: MEDICARE

## 2024-06-18 VITALS
SYSTOLIC BLOOD PRESSURE: 103 MMHG | OXYGEN SATURATION: 99 % | HEART RATE: 54 BPM | RESPIRATION RATE: 16 BRPM | DIASTOLIC BLOOD PRESSURE: 63 MMHG

## 2024-06-18 LAB — COLONOSCOPY: NORMAL

## 2024-06-18 PROCEDURE — 250N000009 HC RX 250: Performed by: NURSE ANESTHETIST, CERTIFIED REGISTERED

## 2024-06-18 PROCEDURE — 45385 COLONOSCOPY W/LESION REMOVAL: CPT | Mod: PT

## 2024-06-18 PROCEDURE — 88305 TISSUE EXAM BY PATHOLOGIST: CPT | Mod: TC | Performed by: SPECIALIST

## 2024-06-18 PROCEDURE — 250N000011 HC RX IP 250 OP 636: Performed by: NURSE ANESTHETIST, CERTIFIED REGISTERED

## 2024-06-18 PROCEDURE — 45380 COLONOSCOPY AND BIOPSY: CPT | Mod: PT,XU | Performed by: SPECIALIST

## 2024-06-18 PROCEDURE — 258N000003 HC RX IP 258 OP 636: Mod: JZ | Performed by: NURSE ANESTHETIST, CERTIFIED REGISTERED

## 2024-06-18 PROCEDURE — 370N000017 HC ANESTHESIA TECHNICAL FEE, PER MIN: Performed by: SPECIALIST

## 2024-06-18 RX ORDER — DEXAMETHASONE SODIUM PHOSPHATE 10 MG/ML
4 INJECTION, SOLUTION INTRAMUSCULAR; INTRAVENOUS
Status: DISCONTINUED | OUTPATIENT
Start: 2024-06-18 | End: 2024-06-18 | Stop reason: HOSPADM

## 2024-06-18 RX ORDER — EPINEPHRINE 0.15 MG/.15ML
0.15 INJECTION SUBCUTANEOUS PRN
COMMUNITY

## 2024-06-18 RX ORDER — ONDANSETRON 2 MG/ML
4 INJECTION INTRAMUSCULAR; INTRAVENOUS EVERY 30 MIN PRN
Status: DISCONTINUED | OUTPATIENT
Start: 2024-06-18 | End: 2024-06-18 | Stop reason: HOSPADM

## 2024-06-18 RX ORDER — SODIUM CHLORIDE, SODIUM LACTATE, POTASSIUM CHLORIDE, CALCIUM CHLORIDE 600; 310; 30; 20 MG/100ML; MG/100ML; MG/100ML; MG/100ML
INJECTION, SOLUTION INTRAVENOUS CONTINUOUS PRN
Status: DISCONTINUED | OUTPATIENT
Start: 2024-06-18 | End: 2024-06-18

## 2024-06-18 RX ORDER — PROPOFOL 10 MG/ML
INJECTION, EMULSION INTRAVENOUS CONTINUOUS PRN
Status: DISCONTINUED | OUTPATIENT
Start: 2024-06-18 | End: 2024-06-18

## 2024-06-18 RX ORDER — NALOXONE HYDROCHLORIDE 0.4 MG/ML
0.1 INJECTION, SOLUTION INTRAMUSCULAR; INTRAVENOUS; SUBCUTANEOUS
Status: DISCONTINUED | OUTPATIENT
Start: 2024-06-18 | End: 2024-06-18 | Stop reason: HOSPADM

## 2024-06-18 RX ORDER — LIDOCAINE 40 MG/G
CREAM TOPICAL
Status: DISCONTINUED | OUTPATIENT
Start: 2024-06-18 | End: 2024-06-18 | Stop reason: HOSPADM

## 2024-06-18 RX ORDER — ONDANSETRON 4 MG/1
4 TABLET, ORALLY DISINTEGRATING ORAL EVERY 30 MIN PRN
Status: DISCONTINUED | OUTPATIENT
Start: 2024-06-18 | End: 2024-06-18 | Stop reason: HOSPADM

## 2024-06-18 RX ORDER — SODIUM CHLORIDE, SODIUM LACTATE, POTASSIUM CHLORIDE, CALCIUM CHLORIDE 600; 310; 30; 20 MG/100ML; MG/100ML; MG/100ML; MG/100ML
INJECTION, SOLUTION INTRAVENOUS CONTINUOUS
Status: DISCONTINUED | OUTPATIENT
Start: 2024-06-18 | End: 2024-06-18 | Stop reason: HOSPADM

## 2024-06-18 RX ORDER — LIDOCAINE HYDROCHLORIDE 10 MG/ML
INJECTION, SOLUTION INFILTRATION; PERINEURAL PRN
Status: DISCONTINUED | OUTPATIENT
Start: 2024-06-18 | End: 2024-06-18

## 2024-06-18 RX ADMIN — LIDOCAINE HYDROCHLORIDE 50 MG: 10 INJECTION, SOLUTION INFILTRATION; PERINEURAL at 13:32

## 2024-06-18 RX ADMIN — SODIUM CHLORIDE, POTASSIUM CHLORIDE, SODIUM LACTATE AND CALCIUM CHLORIDE: 600; 310; 30; 20 INJECTION, SOLUTION INTRAVENOUS at 13:31

## 2024-06-18 RX ADMIN — PROPOFOL 100 MG: 10 INJECTION, EMULSION INTRAVENOUS at 13:32

## 2024-06-18 RX ADMIN — SODIUM CHLORIDE, POTASSIUM CHLORIDE, SODIUM LACTATE AND CALCIUM CHLORIDE: 600; 310; 30; 20 INJECTION, SOLUTION INTRAVENOUS at 13:03

## 2024-06-18 RX ADMIN — PROPOFOL 200 MCG/KG/MIN: 10 INJECTION, EMULSION INTRAVENOUS at 13:33

## 2024-06-18 ASSESSMENT — ACTIVITIES OF DAILY LIVING (ADL)
ADLS_ACUITY_SCORE: 35

## 2024-06-18 NOTE — ANESTHESIA PREPROCEDURE EVALUATION
Anesthesia Pre-Procedure Evaluation    Patient: Lani Barry   MRN: [id number] : 1955        Procedure : Procedure(s):  Colonoscopy          Past Medical History:   Diagnosis Date    Arthritis     Cerebral infarction (H)     Morbid obesity (H) 10/25/2017      Past Surgical History:   Procedure Laterality Date    ABDOMEN SURGERY      Several including 2 c-sections and a back surgery    APPENDECTOMY  1977    Incidental appendectamy at birth of child.    ARTHROPLASTY KNEE Left 2009    BACK SURGERY  2014    Full fusion L4, L5, S1    BIOPSY      2 occasions...1on chest for spot..2 needle in right breast    COLONOSCOPY   or     Can't remember exact date    GYN SURGERY  1979    Hysterectomy    INJECT FACET JOINT N/A 7/10/2017    Procedure: INJECT FACET JOINT;  Lumbar 3-4 Bilateral Facet joint injection;  Surgeon: Misha Rae MD;  Location: [facility] OR    JOINT REPLACEMENT      LUMBAR FUSION      RELEASE ULNAR NERVE (ELBOW) Left 2016    Procedure: RELEASE ULNAR NERVE (ELBOW);  Surgeon: Steve Parks DO;  Location: [facility] OR    REPLACEMENT TOTAL KNEE Right 7/10/2019    Procedure: RIGHT TOTAL KNEE ARTHROPLASTY COMPUTER ASSIST;  Surgeon: Luigi Candelario MD;  Location: [facility] OR;  Service: Orthopedics    TRANSPOSITION ULNAR NERVE (ELBOW) Left 2018    Procedure: TRANSPOSITION ULNAR NERVE (ELBOW);  Left ulnar nerve decompression with transposition;  Surgeon: Steve Parks DO;  Location: [facility] OR      Allergies   Allergen Reactions    Bees     Penicillins     Seasonal Allergies       Social History     Tobacco Use    Smoking status: Every Day     Current packs/day: 0.00     Average packs/day: 0.1 packs/day for 37.2 years (3.7 ttl pk-yrs)     Types: Cigars, Cigarettes     Start date: 1981     Last attempt to quit: 2019     Years since quittin.4    Smokeless tobacco: Never   Substance Use Topics    Alcohol use: No     Alcohol/week: 0.0  standard drinks of alcohol      Wt Readings from Last 1 Encounters:   12/13/23 72.6 kg (160 lb)        Anesthesia Evaluation   Pt has had prior anesthetic.         ROS/MED HX  ENT/Pulmonary:     (+)                tobacco use, Current use,                       Neurologic:       Cardiovascular:     (+) Dyslipidemia - -   -  - -                                 Previous cardiac testing   Echo: Date: Results:    Stress Test:  Date: Results:    ECG Reviewed:  Date: 2019 Results:  SB  Cath:  Date: Results:      METS/Exercise Tolerance:     Hematologic:       Musculoskeletal: Comment: Numbness/tingling left hand  (+)  arthritis,             GI/Hepatic:     (+) GERD,       bowel prep,            Renal/Genitourinary:  - neg Renal ROS     Endo:       Psychiatric/Substance Use:  - neg psychiatric ROS     Infectious Disease:  - neg infectious disease ROS     Malignancy:  - neg malignancy ROS     Other:          Physical Exam    Airway  airway exam normal      Mallampati: II   TM distance: > 3 FB   Neck ROM: full   Mouth opening: > 3 cm    Respiratory Devices and Support         Dental  no notable dental history         Cardiovascular   cardiovascular exam normal       Rhythm and rate: regular and normal     Pulmonary   pulmonary exam normal        breath sounds clear to auscultation           OUTSIDE LABS:  CBC:   Lab Results   Component Value Date    WBC 9.7 12/13/2023    WBC 8.6 06/23/2023    HGB 13.7 12/13/2023    HGB 14.8 06/23/2023    HCT 41.3 12/13/2023    HCT 45.5 06/23/2023     12/13/2023     06/23/2023     BMP:   Lab Results   Component Value Date     12/13/2023     06/23/2023    POTASSIUM 3.7 12/13/2023    POTASSIUM 4.9 06/23/2023    CHLORIDE 105 12/13/2023    CHLORIDE 106 06/23/2023    CO2 22 12/13/2023    CO2 25 06/23/2023    BUN 8.1 12/13/2023    BUN 10.3 06/23/2023    CR 0.71 12/13/2023    CR 0.76 06/23/2023     (H) 12/13/2023     (H) 06/23/2023     COAGS:   Lab Results  "  Component Value Date    INR 0.93 07/10/2019     POC: No results found for: \"BGM\", \"HCG\", \"HCGS\"  HEPATIC:   Lab Results   Component Value Date    ALBUMIN 3.8 12/13/2023    PROTTOTAL 6.1 (L) 12/13/2023    ALT 12 12/13/2023    AST 17 12/13/2023    ALKPHOS 66 12/13/2023    BILITOTAL 0.2 12/13/2023     OTHER:   Lab Results   Component Value Date    EDITH 9.3 12/13/2023       Anesthesia Plan    ASA Status:  2    NPO Status:  NPO Appropriate    Anesthesia Type: MAC.     - Reason for MAC: straight local not clinically adequate   Induction: Intravenous, Propofol.   Maintenance: TIVA.        Consents    Anesthesia Plan(s) and associated risks, benefits, and realistic alternatives discussed. Questions answered and patient/representative(s) expressed understanding.     - Discussed:     - Discussed with:  Patient      - Extended Intubation/Ventilatory Support Discussed: No.      - Patient is DNR/DNI Status: No     Use of blood products discussed: No .     Postoperative Care    Pain management: Oral pain medications.   PONV prophylaxis: Background Propofol Infusion     Comments:    Other Comments: The risks and benefits of anesthesia, and the alternatives where applicable, have been discussed with the patient, and they wish to proceed.              EL Su CRNA    I have reviewed the pertinent notes and labs in the chart from the past 30 days and (re)examined the patient.  Any updates or changes from those notes are reflected in this note.                  "

## 2024-06-18 NOTE — ANESTHESIA CARE TRANSFER NOTE
Patient: Lani Barry    Procedure: Procedure(s):  COLONOSCOPY, WITH POLYPECTOMY       Diagnosis: Special screening for malignant neoplasms, colon [Z12.11]  Diagnosis Additional Information: No value filed.    Anesthesia Type:   MAC     Note:    Oropharynx: oropharynx clear of all foreign objects and spontaneously breathing  Level of Consciousness: drowsy  Oxygen Supplementation: room air    Independent Airway: airway patency satisfactory and stable  Dentition: dentition unchanged  Vital Signs Stable: post-procedure vital signs reviewed and stable  Report to RN Given: handoff report given  Patient transferred to: Phase II    Handoff Report: Identifed the Patient, Identified the Reponsible Provider, Reviewed the pertinent medical history, Discussed the surgical course, Reviewed Intra-OP anesthesia mangement and issues during anesthesia, Set expectations for post-procedure period and Allowed opportunity for questions and acknowledgement of understanding  Vitals:  Vitals Value Taken Time   BP 94/58 06/18/24 1410   Temp     Pulse 51 06/18/24 1410   Resp     SpO2 99 % 06/18/24 1416   Vitals shown include unfiled device data.    Electronically Signed By: EL Su CRNA  June 18, 2024  2:17 PM

## 2024-06-18 NOTE — ANESTHESIA POSTPROCEDURE EVALUATION
Patient: Lani Barry    Procedure: Procedure(s):  COLONOSCOPY, WITH POLYPECTOMY       Anesthesia Type:  MAC    Note:  Disposition: Outpatient   Postop Pain Control: Uneventful            Sign Out: Well controlled pain   PONV: No   Neuro/Psych: Uneventful            Sign Out: Acceptable/Baseline neuro status   Airway/Respiratory: Uneventful            Sign Out: Acceptable/Baseline resp. status   CV/Hemodynamics: Uneventful            Sign Out: Acceptable CV status   Other NRE: NONE   DID A NON-ROUTINE EVENT OCCUR? No    Event details/Postop Comments:  Pt was happy with anesthesia care.  No complications.  I will follow up with the pt if needed.       Last vitals:  Vitals Value Taken Time   BP 94/58 06/18/24 1410   Temp     Pulse 51 06/18/24 1410   Resp     SpO2 99 % 06/18/24 1416   Vitals shown include unfiled device data.    Electronically Signed By: EL Su CRNA  June 18, 2024  2:18 PM

## 2024-06-18 NOTE — DISCHARGE INSTRUCTIONS
[facility]    Home Care Following Endoscopy          Activity:  You have just undergone an endoscopic procedure performed with sedation.    Do not work or operate machinery (including a car) OR drink alcohol (including beer) OR sign legal papers for at least 12 hours.    I encourage you to walk and attempt to pass this air as soon as possible.    Diet:  Return to the diet you were on before your procedure but eat lightly for the first 12-24 hours.  Drink plenty of water.  Resume any regular medications unless otherwise advised by your physician.     You had polyps removed so please refrain from aspirin or aspirin products for 2 days.    Pain:  You may take Tylenol as needed for pain.  Expected Recovery:  You can expect some mild abdominal fullness and/or discomfort due to the air used to inflate your intestinal tract.   I encourage you to walk and attempt to pass this air as soon as possible.    Call Your Physician if You Have:    After Colonoscopy:  Worsening persisting abdominal pain which is worse with activity.  Fevers (>101 degrees F), chills or shakes.  Passage of continued blood with bowel movements.     Any questions or concerns about your recovery, please call [phone] or after hours [phone] ([phone]) Nurse Advice Line.    Follow-up Care:  You did have polyps removed.  The polyps will be sent to pathology.    You should receive letter in your My Chart from Dr.Richard Russo with your results within 1-2 weeks.    Please call if you have not received a notification of your results.

## 2024-06-23 LAB
PATH REPORT.COMMENTS IMP SPEC: NORMAL
PATH REPORT.COMMENTS IMP SPEC: NORMAL
PATH REPORT.FINAL DX SPEC: NORMAL
PATH REPORT.GROSS SPEC: NORMAL
PATH REPORT.MICROSCOPIC SPEC OTHER STN: NORMAL
PATH REPORT.RELEVANT HX SPEC: NORMAL
PHOTO IMAGE: NORMAL

## 2024-06-23 PROCEDURE — 88305 TISSUE EXAM BY PATHOLOGIST: CPT | Mod: 26

## 2024-07-16 ENCOUNTER — MYC REFILL (OUTPATIENT)
Dept: INTERNAL MEDICINE | Facility: CLINIC | Age: 69
End: 2024-07-16

## 2024-07-16 ENCOUNTER — OFFICE VISIT (OUTPATIENT)
Dept: INTERNAL MEDICINE | Facility: CLINIC | Age: 69
End: 2024-07-16
Payer: MEDICARE

## 2024-07-16 VITALS
BODY MASS INDEX: 32.67 KG/M2 | HEART RATE: 77 BPM | TEMPERATURE: 98.3 F | SYSTOLIC BLOOD PRESSURE: 105 MMHG | WEIGHT: 166.4 LBS | DIASTOLIC BLOOD PRESSURE: 55 MMHG | OXYGEN SATURATION: 99 % | HEIGHT: 60 IN | RESPIRATION RATE: 10 BRPM

## 2024-07-16 DIAGNOSIS — J30.2 SEASONAL ALLERGIC RHINITIS, UNSPECIFIED TRIGGER: ICD-10-CM

## 2024-07-16 DIAGNOSIS — E66.01 MORBID OBESITY (H): ICD-10-CM

## 2024-07-16 DIAGNOSIS — Z00.00 MEDICARE ANNUAL WELLNESS VISIT, SUBSEQUENT: Primary | ICD-10-CM

## 2024-07-16 DIAGNOSIS — H93.13 TINNITUS, BILATERAL: ICD-10-CM

## 2024-07-16 DIAGNOSIS — K21.9 GASTROESOPHAGEAL REFLUX DISEASE WITHOUT ESOPHAGITIS: ICD-10-CM

## 2024-07-16 DIAGNOSIS — E78.5 HYPERLIPIDEMIA LDL GOAL <130: ICD-10-CM

## 2024-07-16 DIAGNOSIS — L30.9 ECZEMA, UNSPECIFIED TYPE: ICD-10-CM

## 2024-07-16 LAB
ALBUMIN SERPL BCG-MCNC: 4.1 G/DL (ref 3.5–5.2)
ALBUMIN UR-MCNC: NEGATIVE MG/DL
ALP SERPL-CCNC: 68 U/L (ref 40–150)
ALT SERPL W P-5'-P-CCNC: 12 U/L (ref 0–50)
ANION GAP SERPL CALCULATED.3IONS-SCNC: 13 MMOL/L (ref 7–15)
APPEARANCE UR: CLEAR
AST SERPL W P-5'-P-CCNC: 16 U/L (ref 0–45)
BILIRUB DIRECT SERPL-MCNC: <0.2 MG/DL (ref 0–0.3)
BILIRUB SERPL-MCNC: 0.3 MG/DL
BILIRUB UR QL STRIP: NEGATIVE
BUN SERPL-MCNC: 9.4 MG/DL (ref 8–23)
CALCIUM SERPL-MCNC: 9.4 MG/DL (ref 8.8–10.4)
CHLORIDE SERPL-SCNC: 105 MMOL/L (ref 98–107)
CHOLEST SERPL-MCNC: 294 MG/DL
COLOR UR AUTO: YELLOW
CREAT SERPL-MCNC: 0.7 MG/DL (ref 0.51–0.95)
EGFRCR SERPLBLD CKD-EPI 2021: >90 ML/MIN/1.73M2
FASTING STATUS PATIENT QL REPORTED: YES
FASTING STATUS PATIENT QL REPORTED: YES
GLUCOSE SERPL-MCNC: 88 MG/DL (ref 70–99)
GLUCOSE UR STRIP-MCNC: NEGATIVE MG/DL
HCO3 SERPL-SCNC: 21 MMOL/L (ref 22–29)
HDLC SERPL-MCNC: 65 MG/DL
HGB UR QL STRIP: NEGATIVE
KETONES UR STRIP-MCNC: NEGATIVE MG/DL
LDLC SERPL CALC-MCNC: 213 MG/DL
LEUKOCYTE ESTERASE UR QL STRIP: NEGATIVE
NITRATE UR QL: NEGATIVE
NONHDLC SERPL-MCNC: 229 MG/DL
PH UR STRIP: 5 [PH] (ref 5–7)
POTASSIUM SERPL-SCNC: 3.9 MMOL/L (ref 3.4–5.3)
PROT SERPL-MCNC: 6.8 G/DL (ref 6.4–8.3)
SODIUM SERPL-SCNC: 139 MMOL/L (ref 135–145)
SP GR UR STRIP: 1.01 (ref 1–1.03)
TRIGL SERPL-MCNC: 81 MG/DL
UROBILINOGEN UR STRIP-MCNC: NORMAL MG/DL

## 2024-07-16 PROCEDURE — 81003 URINALYSIS AUTO W/O SCOPE: CPT | Performed by: INTERNAL MEDICINE

## 2024-07-16 PROCEDURE — 99213 OFFICE O/P EST LOW 20 MIN: CPT | Mod: 25 | Performed by: INTERNAL MEDICINE

## 2024-07-16 PROCEDURE — G0439 PPPS, SUBSEQ VISIT: HCPCS | Performed by: INTERNAL MEDICINE

## 2024-07-16 PROCEDURE — 82248 BILIRUBIN DIRECT: CPT | Performed by: INTERNAL MEDICINE

## 2024-07-16 PROCEDURE — 36415 COLL VENOUS BLD VENIPUNCTURE: CPT | Performed by: INTERNAL MEDICINE

## 2024-07-16 PROCEDURE — 80053 COMPREHEN METABOLIC PANEL: CPT | Performed by: INTERNAL MEDICINE

## 2024-07-16 PROCEDURE — 80061 LIPID PANEL: CPT | Performed by: INTERNAL MEDICINE

## 2024-07-16 RX ORDER — RESPIRATORY SYNCYTIAL VIRUS VACCINE 120MCG/0.5
0.5 KIT INTRAMUSCULAR ONCE
Qty: 1 EACH | Refills: 0 | Status: CANCELLED | OUTPATIENT
Start: 2024-07-16 | End: 2024-07-16

## 2024-07-16 SDOH — HEALTH STABILITY: PHYSICAL HEALTH: ON AVERAGE, HOW MANY MINUTES DO YOU ENGAGE IN EXERCISE AT THIS LEVEL?: 60 MIN

## 2024-07-16 SDOH — HEALTH STABILITY: PHYSICAL HEALTH: ON AVERAGE, HOW MANY DAYS PER WEEK DO YOU ENGAGE IN MODERATE TO STRENUOUS EXERCISE (LIKE A BRISK WALK)?: 5 DAYS

## 2024-07-16 ASSESSMENT — PAIN SCALES - GENERAL: PAINLEVEL: NO PAIN (0)

## 2024-07-16 ASSESSMENT — SOCIAL DETERMINANTS OF HEALTH (SDOH): HOW OFTEN DO YOU GET TOGETHER WITH FRIENDS OR RELATIVES?: ONCE A WEEK

## 2024-07-16 NOTE — PROGRESS NOTES
"Preventive Care Visit  Formerly [facility]  Cipriano Arias DO, Internal Medicine  Jul 16, 2024      Assessment & Plan     Medicare annual wellness visit, subsequent      Tinnitus, bilateral  Will set up with audiology for evaluation.  Refer to ENT pending results.  - Adult Audiology  Referral; Future    Eczema, unspecified type  Primarily in the external auditory canals.  No signs of infection or obstruction.  Would recommend mild steroid cream but patient notes this is not symptomatic and prefers not to use any additional medication.    Seasonal allergic rhinitis, unspecified trigger  Let's at this time.    Hyperlipidemia LDL goal <130    - Lipid panel reflex to direct LDL Fasting; Future  - Basic metabolic panel  (Ca, Cl, CO2, Creat, Gluc, K, Na, BUN); Future  - UA Macroscopic with reflex to Microscopic and Culture - Lab Collect; Future  - Hepatic panel (Albumin, ALT, AST, Bili, Alk Phos, TP); Future    Morbid obesity (H)  Recommend weight loss responsible caloric restriction and exercise as tolerated    Gastroesophageal reflux disease without esophagitis  Currently stable.  Patient continues to eat earlier in the evening, avoid chocolates and irritants, and rarely uses over-the-counter PPI.    Patient has been advised of split billing requirements and indicates understanding: Yes        Nicotine/Tobacco Cessation  She reports that she has been smoking cigars. She started smoking about 42 years ago. She has a 3.7 pack-year smoking history. She has never used smokeless tobacco.  Nicotine/Tobacco Cessation Plan  Information offered: Patient not interested at this time      BMI  Estimated body mass index is 32.41 kg/m  as calculated from the following:    Height as of this encounter: 1.526 m (5' 0.08\").    Weight as of this encounter: 75.5 kg (166 lb 6.4 oz).   Weight management plan: Discussed healthy diet and exercise guidelines    Counseling  Appropriate preventive " services were discussed with this patient, including applicable screening as appropriate for fall prevention, nutrition, physical activity, Tobacco-use cessation, weight loss and cognition.  Checklist reviewing preventive services available has been given to the patient.  Reviewed patient's diet, addressing concerns and/or questions.       MEDICATIONS:  Continue current medications without change  Work on weight loss  Regular exercise    La Garibay is a 69 year old, presenting for the following:  Physical and Tinnitus        7/16/2024     2:10 PM   Additional Questions   Roomed by Elenita         7/16/2024     2:10 PM   Patient Reported Additional Medications   Patient reports taking the following new medications none         Health Care Directive  Patient does not have a Health Care Directive or Living Will: Discussed advance care planning with patient; however, patient declined at this time.    HPI              7/16/2024   General Health   How would you rate your overall physical health? Excellent   Feel stress (tense, anxious, or unable to sleep) Not at all            7/16/2024   Nutrition   Diet: Regular (no restrictions)            7/16/2024   Exercise   Days per week of moderate/strenous exercise 5 days   Average minutes spent exercising at this level 60 min            7/16/2024   Social Factors   Frequency of gathering with friends or relatives Once a week   Worry food won't last until get money to buy more No   Food not last or not have enough money for food? No   Do you have housing? (Housing is defined as stable permanent housing and does not include staying ouside in a car, in a tent, in an abandoned building, in an overnight shelter, or couch-surfing.) Yes   Are you worried about losing your housing? No   Lack of transportation? No   Unable to get utilities (heat,electricity)? No            7/16/2024   Fall Risk   Fallen 2 or more times in the past year? No    No   Trouble with walking or  balance? No    No       Multiple values from one day are sorted in reverse-chronological order          2024   Activities of Daily Living- Home Safety   Needs help with the following daily activites None of the above   Safety concerns in the home None of the above            2024   Dental   Dentist two times every year? Yes            2024   Hearing Screening   Hearing concerns? None of the above            2024   Driving Risk Screening   Patient/family members have concerns about driving No            2024   General Alertness/Fatigue Screening   Have you been more tired than usual lately? No            2024   Urinary Incontinence Screening   Bothered by leaking urine in past 6 months No            2024   TB Screening   Were you born outside of the [place]? Yes            Today's PHQ-2 Score:       2024     2:13 PM   PHQ-2 (  [employer])   Q1: Little interest or pleasure in doing things 0   Q2: Feeling down, depressed or hopeless 0   PHQ-2 Score 0   Q1: Little interest or pleasure in doing things Not at all   Q2: Feeling down, depressed or hopeless Not at all   PHQ-2 Score 0           2024   Substance Use   If I could quit smoking, I would Completely agree   I want to quit somking, worry about health affects Neutral   Willing to make a plan to quit smoking Neutral   Willing to cut down before quitting Neutral   Alcohol more than 3/day or more than 7/wk No   Do you have a current opioid prescription? No   How severe/bad is pain from 1 to 10? 0/10 (No Pain)   Do you use any other substances recreationally? No        Social History     Tobacco Use    Smoking status: Every Day     Current packs/day: 0.00     Average packs/day: 0.1 packs/day for 37.2 years (3.7 ttl pk-yrs)     Types: Cigars, Cigarettes     Start date: 1981     Last attempt to quit: 2019     Years since quittin.5    Smokeless tobacco: Never   Vaping Use    Vaping status: Never Used   Substance Use  Topics    Alcohol use: No     Alcohol/week: 0.0 standard drinks of alcohol    Drug use: No           5/17/2024   LAST FHS-7 RESULTS   1st degree relative breast or ovarian cancer Unknown   Any relative bilateral breast cancer Unknown   Any male have breast cancer Unknown   Any ONE woman have BOTH breast AND ovarian cancer Unknown   Any woman with breast cancer before 50yrs Unknown   2 or more relatives with breast AND/OR ovarian cancer Unknown   2 or more relatives with breast AND/OR bowel cancer Unknown           Mammogram Screening - Mammogram every 1-2 years updated in Health Maintenance based on mutual decision making      History of abnormal Pap smear: Status post hysterectomy with removal of cervix and no history of CIN2 or greater or cervical cancer. Health Maintenance and Surgical History updated.       ASCVD Risk   The ASCVD Risk score (Lynda DK, et al., 2019) failed to calculate for the following reasons:    The patient has a prior MI or stroke diagnosis            Reviewed and updated as needed this visit by Provider                    Past Medical History:   Diagnosis Date    Arthritis     Cerebral infarction (H)     Morbid obesity (H) 10/25/2017     Past Surgical History:   Procedure Laterality Date    ABDOMEN SURGERY      Several including 2 c-sections and a back surgery    APPENDECTOMY  5/26/1977    Incidental appendectamy at birth of child.    ARTHROPLASTY KNEE Left 05/2009    BACK SURGERY  1/16/2014    Full fusion L4, L5, S1    BIOPSY      2 occasions...1on chest for spot..2 needle in right breast    COLONOSCOPY  2017 or 2018    Can't remember exact date    COLONOSCOPY N/A 6/18/2024    Procedure: COLONOSCOPY, WITH POLYPECTOMY;  Surgeon: Misha Russo MD;  Location:  GI    GYN SURGERY  Sept 1979    Hysterectomy    INJECT FACET JOINT N/A 7/10/2017    Procedure: INJECT FACET JOINT;  Lumbar 3-4 Bilateral Facet joint injection;  Surgeon: Misha Rae MD;  Location:  OR     JOINT REPLACEMENT      LUMBAR FUSION      RELEASE ULNAR NERVE (ELBOW) Left 8/30/2016    Procedure: RELEASE ULNAR NERVE (ELBOW);  Surgeon: Steve Parks DO;  Location:  OR    REPLACEMENT TOTAL KNEE Right 7/10/2019    Procedure: RIGHT TOTAL KNEE ARTHROPLASTY COMPUTER ASSIST;  Surgeon: Luigi Candelario MD;  Location: [facility];  Service: Orthopedics    TRANSPOSITION ULNAR NERVE (ELBOW) Left 6/19/2018    Procedure: TRANSPOSITION ULNAR NERVE (ELBOW);  Left ulnar nerve decompression with transposition;  Surgeon: Steve Parks DO;  Location:  OR     OB History   No obstetric history on file.     Lab work is in process  Labs reviewed in EPIC  BP Readings from Last 3 Encounters:   07/16/24 105/55   06/18/24 103/63   12/13/23 119/84    Wt Readings from Last 3 Encounters:   07/16/24 75.5 kg (166 lb 6.4 oz)   12/13/23 72.6 kg (160 lb)   06/23/23 73.9 kg (163 lb)                  Patient Active Problem List   Diagnosis    Lesion of left ulnar nerve    Gastroesophageal reflux disease without esophagitis    Hyperlipidemia LDL goal <130    Cubital tunnel syndrome, left    Morbid obesity (H)    S/P cubital tunnel release    Numbness and tingling in left hand     Past Surgical History:   Procedure Laterality Date    ABDOMEN SURGERY      Several including 2 c-sections and a back surgery    APPENDECTOMY  5/26/1977    Incidental appendectamy at birth of child.    ARTHROPLASTY KNEE Left 05/2009    BACK SURGERY  1/16/2014    Full fusion L4, L5, S1    BIOPSY      2 occasions...1on chest for spot..2 needle in right breast    COLONOSCOPY  2017 or 2018    Can't remember exact date    COLONOSCOPY N/A 6/18/2024    Procedure: COLONOSCOPY, WITH POLYPECTOMY;  Surgeon: Misha Russo MD;  Location:  GI    GYN SURGERY  Sept 1979    Hysterectomy    INJECT FACET JOINT N/A 7/10/2017    Procedure: INJECT FACET JOINT;  Lumbar 3-4 Bilateral Facet joint injection;  Surgeon: Misha Rae MD;  Location:   OR    JOINT REPLACEMENT      LUMBAR FUSION      RELEASE ULNAR NERVE (ELBOW) Left 2016    Procedure: RELEASE ULNAR NERVE (ELBOW);  Surgeon: Steve Parks DO;  [facility]: [facility]    REPLACEMENT TOTAL KNEE Right 7/10/2019    Procedure: RIGHT TOTAL KNEE ARTHROPLASTY COMPUTER ASSIST;  Surgeon: Luigi Candelario MD;  Location: [facility];  Service: Orthopedics    TRANSPOSITION ULNAR NERVE (ELBOW) Left 2018    Procedure: TRANSPOSITION ULNAR NERVE (ELBOW);  Left ulnar nerve decompression with transposition;  Surgeon: Steve Parks DO;  Location:  OR       Social History     Tobacco Use    Smoking status: Every Day     Current packs/day: 0.00     Average packs/day: 0.1 packs/day for 37.2 years (3.7 ttl pk-yrs)     Types: Cigars, Cigarettes     Start date: 1981     Last attempt to quit: 2019     Years since quittin.5    Smokeless tobacco: Never   Substance Use Topics    Alcohol use: No     Alcohol/week: 0.0 standard drinks of alcohol     Family History   Problem Relation Age of Onset    Cancer Sister     Cerebrovascular Disease Sister     Diabetes Paternal Uncle          Current Outpatient Medications   Medication Sig Dispense Refill    EPINEPHrine (ADRENACLICK JR) 0.15 MG/0.15ML injection 2-pack Inject 0.15 mg into the muscle as needed for anaphylaxis May repeat one time in 5-15 minutes if response to initial dose is inadequate.Allergic to bees      fluticasone (FLONASE) 50 MCG/ACT nasal spray Spray 1 spray into both nostrils daily 16 g 3    loratadine (CLARITIN) 10 MG tablet Take 1 tablet (10 mg) by mouth daily 90 tablet 2    Multiple Vitamins-Minerals (ONCOVITE) TABS Take 1 tablet by mouth daily       Allergies   Allergen Reactions    Bees Anaphylaxis    Penicillins Shortness Of Breath, Hives and Rash    Seasonal Allergies      Current providers sharing in care for this patient include:  Patient Care Team:  Cipriano Arias DO as PCP - General (Internal  Medicine)  Cipriano Arias DO as Assigned PCP    The following health maintenance items are reviewed in Epic and correct as of today:  Health Maintenance   Topic Date Due    Pneumococcal Vaccine: 65+ Years (1 of 2 - PCV) Never done    DTAP/TDAP/TD IMMUNIZATION (1 - Tdap) Never done    ZOSTER IMMUNIZATION (1 of 2) Never done    LUNG CANCER SCREENING  Never done    RSV VACCINE (Pregnancy & 60+) (1 - 1-dose 60+ series) Never done    COVID-19 Vaccine (1 - 2023-24 season) Never done    LIPID  06/23/2024    ANNUAL REVIEW OF HM ORDERS  06/23/2024    NICOTINE/TOBACCO CESSATION COUNSELING Q 1 YR  06/23/2024    MEDICARE ANNUAL WELLNESS VISIT  06/23/2024    INFLUENZA VACCINE (1) 09/01/2024    FALL RISK ASSESSMENT  07/16/2025    MAMMO SCREENING  05/17/2026    GLUCOSE  12/13/2026    ADVANCE CARE PLANNING  09/11/2028    DEXA  09/26/2028    COLORECTAL CANCER SCREENING  06/18/2029    HEPATITIS C SCREENING  Completed    PHQ-2 (once per calendar year)  Completed    IPV IMMUNIZATION  Aged Out    HPV IMMUNIZATION  Aged Out    MENINGITIS IMMUNIZATION  Aged Out    RSV MONOCLONAL ANTIBODY  Aged Out         Review of Systems  CONSTITUTIONAL: NEGATIVE for fever, chills, change in weight  INTEGUMENTARY/SKIN: NEGATIVE for worrisome rashes, moles or lesions  EYES: NEGATIVE for vision changes or irritation  ENT/MOUTH: Positive for bilateral tinnitus.  Has been present for several years.  Worse on left than right.  RESP: NEGATIVE for significant cough or SOB  BREAST: NEGATIVE for masses, tenderness or discharge  CV: NEGATIVE for chest pain, palpitations or peripheral edema  GI: NEGATIVE for nausea, abdominal pain, heartburn, or change in bowel habits  : NEGATIVE for frequency, dysuria, or hematuria  MUSCULOSKELETAL: NEGATIVE for significant arthralgias or myalgia  NEURO: NEGATIVE for weakness, dizziness or paresthesias  ENDOCRINE: NEGATIVE for temperature intolerance, skin/hair changes  HEME: NEGATIVE for bleeding  "problems  PSYCHIATRIC: NEGATIVE for changes in mood or affect     Objective    Exam  /55 (BP Location: Right arm, Patient Position: Sitting, Cuff Size: Adult Large)   Pulse 77   Temp 98.3  F (36.8  C) (Oral)   Resp 10   Ht 1.526 m (5' 0.08\")   Wt 75.5 kg (166 lb 6.4 oz)   SpO2 99%   BMI 32.41 kg/m     Estimated body mass index is 32.41 kg/m  as calculated from the following:    Height as of this encounter: 1.526 m (5' 0.08\").    Weight as of this encounter: 75.5 kg (166 lb 6.4 oz).    Physical Exam  GENERAL: alert and no distress  EYES: Eyes grossly normal to inspection, PERRL and conjunctivae and sclerae normal  HENT: ear canals and TM's normal, nose and mouth without ulcers or lesions  NECK: no adenopathy, no asymmetry, masses, or scars  RESP: lungs clear to auscultation - no rales, rhonchi or wheezes  CV: regular rate and rhythm, normal S1 S2, no S3 or S4, no murmur, click or rub, no peripheral edema  ABDOMEN: soft, nontender, no hepatosplenomegaly, no masses and bowel sounds normal  MS: no gross musculoskeletal defects noted, no edema  SKIN: no suspicious lesions or rashes  NEURO: Normal strength and tone, mentation intact and speech normal  PSYCH: mentation appears normal, affect normal/bright        7/16/2024   Mini Cog   Clock Draw Score 2 Normal   3 Item Recall 2 objects recalled   Mini Cog Total Score 4                 I have reviewed the patient's vaccination schedule and discussed the benefits of prophylactic vaccination in detail.  I recommend the patient contact their pharmacist for vaccinations.  Discussed that most insurance companies now favor reimbursement to the pharmacies and it will financially behoove the patient to have vaccinations performed at their pharmacy.  At this time, the patient is not interested in any vaccinations.      Signed Electronically by: Cipriano Arias DO    "

## 2024-07-17 ENCOUNTER — MYC REFILL (OUTPATIENT)
Dept: INTERNAL MEDICINE | Facility: CLINIC | Age: 69
End: 2024-07-17
Payer: MEDICARE

## 2024-07-17 DIAGNOSIS — E56.9 VITAMIN DEFICIENCY: Primary | ICD-10-CM

## 2024-07-17 RX ORDER — MULTIVITAMIN,THERAPEUTIC
1 TABLET ORAL DAILY
Qty: 90 TABLET | Refills: 0 | Status: SHIPPED | OUTPATIENT
Start: 2024-07-17

## 2024-07-17 RX ORDER — LORATADINE 10 MG/1
10 TABLET ORAL DAILY
Qty: 90 TABLET | Refills: 2 | Status: SHIPPED | OUTPATIENT
Start: 2024-07-17

## 2024-07-17 NOTE — TELEPHONE ENCOUNTER
Clinic RN: Please contact patient because patient should have run out of this medication on 2021. Confirm patient is taking this medication as prescribed. Document findings and route refill encounter to provider for approval or denial.    Teodora RITCHIE RN  [facility]

## 2024-07-18 ENCOUNTER — MYC MEDICAL ADVICE (OUTPATIENT)
Dept: INTERNAL MEDICINE | Facility: CLINIC | Age: 69
End: 2024-07-18
Payer: MEDICARE

## 2024-07-18 DIAGNOSIS — E78.5 HYPERLIPIDEMIA LDL GOAL <130: Primary | ICD-10-CM

## 2024-07-18 RX ORDER — ROSUVASTATIN CALCIUM 40 MG/1
20 TABLET, COATED ORAL DAILY
Qty: 90 TABLET | Refills: 0 | Status: SHIPPED | OUTPATIENT
Start: 2024-07-18

## 2024-07-19 RX ORDER — FLUTICASONE PROPIONATE 50 MCG
1 SPRAY, SUSPENSION (ML) NASAL DAILY
Qty: 16 G | Refills: 3 | Status: SHIPPED | OUTPATIENT
Start: 2024-07-19

## 2024-07-26 NOTE — PROGRESS NOTES
62F w/ hx A/P lumbar fusion, presents with back and leg pain.  Underwent A/P lumbar fusion in 2014 in [place], has undergone lumbar MBB every 6 months since then with a pain doctor in [place].  Over the last year, progressive left low back pain, and aching pain radiating to the posterior thigh and calf.  No weakness or sensory changes.    Returns for follow-up.  Has been managing successfully with therapy and pain management.  2 weeks ago, had a fall while going down the stairs.  Since then having 8 out of 10 aching localized low back pain.  Initially was having sharp right posterior leg pain radiating to the calf, but this is subsiding.    Past Medical History:   Diagnosis Date     Arthritis      Cerebral infarction (H)      Morbid obesity (H) 10/25/2017     Past Surgical History:   Procedure Laterality Date     INJECT FACET JOINT N/A 7/10/2017    Procedure: INJECT FACET JOINT;  Lumbar 3-4 Bilateral Facet joint injection;  Surgeon: Misha Rae MD;  [facility]     RELEASE ULNAR NERVE (ELBOW) Left 8/30/2016    Procedure: RELEASE ULNAR NERVE (ELBOW);  Surgeon: Steve Parks DO;  [facility]     TRANSPOSITION ULNAR NERVE (ELBOW) Left 6/19/2018    Procedure: TRANSPOSITION ULNAR NERVE (ELBOW);  Left ulnar nerve decompression with transposition;  Surgeon: Steve Parks DO;  Location: [facility]     Social History     Social History     Marital status:      Spouse name: N/A     Number of children: 3     Years of education: N/A     Occupational History     Not on file.     Social History Main Topics     Smoking status: Current Some Day Smoker     Packs/day: 0.10     Types: Cigars     Smokeless tobacco: Never Used     Alcohol use No     Drug use: No     Sexual activity: Yes     Partners: Male     Other Topics Concern     Not on file     Social History Narrative     Family History   Problem Relation Age of Onset     Cancer Sister      Cerebrovascular Disease Sister      Diabetes Paternal  Pt called because she had a mammogram done and they suggested that she have an MRI scheduled. Pt called to scheduled the MRI but there is not order in. Pt is asking if Dr. Thomas would be the one to order this.   "Uncle         ROS: 10 point ROS neg other than the symptoms noted above in the HPI.    Physical Exam  /78 (BP Location: Left arm, Patient Position: Chair, Cuff Size: Adult Large)  Pulse 82  Ht 1.543 m (5' 0.75\")  Wt 93 kg (205 lb)  BMI 39.05 kg/m2  HEENT:  Normocephalic, atraumatic.  PERRLA.  EOM s intact.  Visual fields full to gross exam  Neck:  Supple, non-tender, without lymphadenopathy.  Heart:  No peripheral edema  Lungs:  No SOB  Abdomen:  Non-distended.   Skin:  Warm and dry.  Extremities:  No edema, cyanosis or clubbing.    NEUROLOGICAL EXAMINATION:     Mental status:  Alert and Oriented x 3, speech is fluent.  Cranial nerves:  II-XII intact.   Motor:    Shoulder Abduction:  Right:  5/5   Left:  5/5  Biceps:                      Right:  5/5   Left:  5/5  Triceps:                     Right:  5/5   Left:  5/5  Wrist Extensors:       Right:  5/5   Left:  5/5  Wrist Flexors:           Right:  5/5   Left:  5/5  interosseus :            Right:  5/5   Left:  5/5   Hip Flexor:                Right: 5/5  Left:  5/5  Hip Adductor:             Right:  5/5  Left:  5/5  Hip Abductor:             Right:  5/5  Left:  5/5  Gastroc Soleus:        Right:  5/5  Left:  5/5  Tib/Ant:                      Right:  5/5  Left:  5/5  EHL:                     Right:  5/5  Left:  5/5  Sensation:  Intact  Reflexes:  Negative Babinski.  Negative Clonus.  Negative James's.  Coordination:  Smooth finger to nose testing.   Negative pronator drift.  Smooth tandem walking.    A/P:  62F w/ hx A/P lumbar fusion, presents with back and leg pain    Will obtain new imaging given the recent trauma and fall  We will order a CT and MRI  If stable, will plan for a new course of physical therapy  "

## 2025-06-16 ENCOUNTER — PATIENT OUTREACH (OUTPATIENT)
Dept: CARE COORDINATION | Facility: CLINIC | Age: 70
End: 2025-06-16
Payer: MEDICARE

## 2025-06-30 ENCOUNTER — PATIENT OUTREACH (OUTPATIENT)
Dept: CARE COORDINATION | Facility: CLINIC | Age: 70
End: 2025-06-30
Payer: MEDICARE

## 2025-08-30 ENCOUNTER — HEALTH MAINTENANCE LETTER (OUTPATIENT)
Age: 70
End: 2025-08-30

## (undated) DEVICE — DRSG XEROFORM 1X8"

## (undated) DEVICE — SPONGE LAP 18X18" 1515

## (undated) DEVICE — PREP CHLORAPREP 26ML TINTED ORANGE  260815

## (undated) DEVICE — BNDG ESMARK 6" STERILE

## (undated) DEVICE — SU MONOCRYL 3-0 PS-2 18" UND Y497G

## (undated) DEVICE — NDL SPINAL 26GA 3 1/2"

## (undated) DEVICE — DRSG ABDOMINAL 07 1/2X8" 7197D

## (undated) DEVICE — GLOVE PROTEXIS W/NEU-THERA 7.5  2D73TE75

## (undated) DEVICE — GLOVE PROTEXIS W/NEU-THERA 8.0  2D73TE80

## (undated) DEVICE — BLADE KNIFE SURG 10 371110

## (undated) DEVICE — BNDG COBAN 4"X5YDS STERILE

## (undated) DEVICE — ENDO SNARE EXACTO COLD 9MM LOOP 2.4MMX230CM 00711115

## (undated) DEVICE — SU ETHILON 4-0 PS-2 18" 1667G

## (undated) DEVICE — SYR BULB IRRIG DOVER 60 ML LATEX FREE 67000

## (undated) DEVICE — SOL WATER IRRIG 1000ML BOTTLE 2F7114

## (undated) DEVICE — BNDG ELASTIC 4" DBL LENGTH UNSTERILE 6611-14

## (undated) DEVICE — TUBING SUCTION 6"X3/16" N56A

## (undated) DEVICE — TRAY EPIDURAL 18GA SINGLE SHOT 406092

## (undated) DEVICE — SU VICRYL 3-0 CP-2 18" UND J761D

## (undated) DEVICE — DRAPE STOCKINETTE IMPERVIOUS 09"

## (undated) DEVICE — DRAPE SHEET REV FOLD 3/4 9349

## (undated) DEVICE — CAST PADDING 4" COTTON WEBRIL STERILE 9084S

## (undated) DEVICE — GLOVE PROTEXIS W/NEU-THERA 6.5  2D73TE65

## (undated) DEVICE — SYR 03ML LL W/O NDL

## (undated) DEVICE — SOL ADH LIQUID BENZOIN SWAB 0.6ML C1544

## (undated) DEVICE — DRSG STERI STRIP 1/2X4" R1547

## (undated) DEVICE — DRAPE STERI U 1015

## (undated) DEVICE — DRSG BANDAID 1X3" FABRIC

## (undated) DEVICE — ENDO TRAP POLYP E-TRAP 00711099

## (undated) DEVICE — DRSG GAUZE 4X4" TRAY

## (undated) DEVICE — BASIN SET MINOR DISP

## (undated) DEVICE — SLING ARM MED 79-99155

## (undated) DEVICE — TUBING SUCTION 12"X1/4" N612

## (undated) DEVICE — VESSEL LOOPS RED MINI 31145710

## (undated) DEVICE — KIT ENDO TURNOVER/PROCEDURE CARRY-ON 101822

## (undated) DEVICE — GLOVE PROTEXIS BLUE W/NEU-THERA 6.5  2D73EB65

## (undated) DEVICE — TUBING EXTENSION SET MICROBORE 8.2" LL 7N8310

## (undated) DEVICE — BLADE KNIFE SURG 15 371115

## (undated) DEVICE — ENDO FORCEP ENDOJAW BIOPSY 2.8MMX230CM FB-220U

## (undated) DEVICE — PACK HAND WRIST FOREARM CUSTOM

## (undated) DEVICE — DRAPE CONVERTORS U-DRAPE 60X72" 8476

## (undated) DEVICE — GLOVE ESTEEM BLUE W/NEU-THERA 8.0  2D73PB80

## (undated) RX ORDER — PROPOFOL 10 MG/ML
INJECTION, EMULSION INTRAVENOUS
Status: DISPENSED
Start: 2018-06-19

## (undated) RX ORDER — FENTANYL CITRATE 50 UG/ML
INJECTION, SOLUTION INTRAMUSCULAR; INTRAVENOUS
Status: DISPENSED
Start: 2017-05-10

## (undated) RX ORDER — FENTANYL CITRATE 50 UG/ML
INJECTION, SOLUTION INTRAMUSCULAR; INTRAVENOUS
Status: DISPENSED
Start: 2018-06-19

## (undated) RX ORDER — PHENYLEPHRINE HCL IN 0.9% NACL 1 MG/10 ML
SYRINGE (ML) INTRAVENOUS
Status: DISPENSED
Start: 2018-06-19

## (undated) RX ORDER — BACITRACIN 50000 [IU]/1
INJECTION, POWDER, FOR SOLUTION INTRAMUSCULAR
Status: DISPENSED
Start: 2018-06-19

## (undated) RX ORDER — BUPIVACAINE HYDROCHLORIDE 5 MG/ML
INJECTION, SOLUTION EPIDURAL; INTRACAUDAL
Status: DISPENSED
Start: 2018-06-19

## (undated) RX ORDER — ONDANSETRON 2 MG/ML
INJECTION INTRAMUSCULAR; INTRAVENOUS
Status: DISPENSED
Start: 2018-06-19

## (undated) RX ORDER — LIDOCAINE HYDROCHLORIDE 20 MG/ML
INJECTION, SOLUTION EPIDURAL; INFILTRATION; INTRACAUDAL; PERINEURAL
Status: DISPENSED
Start: 2018-06-19

## (undated) RX ORDER — EPHEDRINE SULFATE 50 MG/ML
INJECTION, SOLUTION INTRAMUSCULAR; INTRAVENOUS; SUBCUTANEOUS
Status: DISPENSED
Start: 2018-06-19

## (undated) RX ORDER — DEXAMETHASONE SODIUM PHOSPHATE 10 MG/ML
INJECTION INTRAMUSCULAR; INTRAVENOUS
Status: DISPENSED
Start: 2018-06-19